# Patient Record
Sex: MALE | Race: BLACK OR AFRICAN AMERICAN | NOT HISPANIC OR LATINO | Employment: OTHER | URBAN - METROPOLITAN AREA
[De-identification: names, ages, dates, MRNs, and addresses within clinical notes are randomized per-mention and may not be internally consistent; named-entity substitution may affect disease eponyms.]

---

## 2017-01-24 ENCOUNTER — LAB VISIT (OUTPATIENT)
Dept: LAB | Facility: HOSPITAL | Age: 72
End: 2017-01-24
Attending: INTERNAL MEDICINE
Payer: MEDICARE

## 2017-01-24 DIAGNOSIS — Z85.71 HISTORY OF HODGKIN'S LYMPHOMA: ICD-10-CM

## 2017-01-24 DIAGNOSIS — C85.90 NON-HODGKIN'S LYMPHOMA, UNSPECIFIED BODY REGION, UNSPECIFIED NON-HODGKIN LYMPHOMA TYPE: ICD-10-CM

## 2017-01-24 LAB
ALBUMIN SERPL BCP-MCNC: 3.7 G/DL
ALP SERPL-CCNC: 78 U/L
ALT SERPL W/O P-5'-P-CCNC: 19 U/L
ANION GAP SERPL CALC-SCNC: 5 MMOL/L
AST SERPL-CCNC: 18 U/L
B2 MICROGLOB SERPL-MCNC: 1.9 UG/ML
BASOPHILS # BLD AUTO: 0.01 K/UL
BASOPHILS NFR BLD: 0.3 %
BILIRUB SERPL-MCNC: 0.4 MG/DL
BUN SERPL-MCNC: 17 MG/DL
CALCIUM SERPL-MCNC: 9.2 MG/DL
CHLORIDE SERPL-SCNC: 104 MMOL/L
CO2 SERPL-SCNC: 32 MMOL/L
CREAT SERPL-MCNC: 1.1 MG/DL
DIFFERENTIAL METHOD: ABNORMAL
EOSINOPHIL # BLD AUTO: 0.1 K/UL
EOSINOPHIL NFR BLD: 2.3 %
ERYTHROCYTE [DISTWIDTH] IN BLOOD BY AUTOMATED COUNT: 14.7 %
EST. GFR  (AFRICAN AMERICAN): >60 ML/MIN/1.73 M^2
EST. GFR  (NON AFRICAN AMERICAN): >60 ML/MIN/1.73 M^2
GLUCOSE SERPL-MCNC: 99 MG/DL
HCT VFR BLD AUTO: 43.2 %
HGB BLD-MCNC: 14 G/DL
LDH SERPL L TO P-CCNC: 196 U/L
LYMPHOCYTES # BLD AUTO: 1.6 K/UL
LYMPHOCYTES NFR BLD: 46 %
MCH RBC QN AUTO: 27.7 PG
MCHC RBC AUTO-ENTMCNC: 32.4 %
MCV RBC AUTO: 85 FL
MONOCYTES # BLD AUTO: 0.4 K/UL
MONOCYTES NFR BLD: 10.9 %
NEUTROPHILS # BLD AUTO: 1.4 K/UL
NEUTROPHILS NFR BLD: 40.5 %
PLATELET # BLD AUTO: 246 K/UL
PMV BLD AUTO: 8.8 FL
POTASSIUM SERPL-SCNC: 4.5 MMOL/L
PROT SERPL-MCNC: 7.2 G/DL
RBC # BLD AUTO: 5.06 M/UL
SODIUM SERPL-SCNC: 141 MMOL/L
TSH SERPL DL<=0.005 MIU/L-ACNC: 3.28 UIU/ML
WBC # BLD AUTO: 3.48 K/UL

## 2017-01-24 PROCEDURE — 85025 COMPLETE CBC W/AUTO DIFF WBC: CPT

## 2017-01-24 PROCEDURE — 84443 ASSAY THYROID STIM HORMONE: CPT

## 2017-01-24 PROCEDURE — 82232 ASSAY OF BETA-2 PROTEIN: CPT

## 2017-01-24 PROCEDURE — 80053 COMPREHEN METABOLIC PANEL: CPT

## 2017-01-24 PROCEDURE — 83615 LACTATE (LD) (LDH) ENZYME: CPT

## 2017-01-24 PROCEDURE — 36415 COLL VENOUS BLD VENIPUNCTURE: CPT

## 2017-01-27 ENCOUNTER — OFFICE VISIT (OUTPATIENT)
Dept: HEMATOLOGY/ONCOLOGY | Facility: CLINIC | Age: 72
End: 2017-01-27
Payer: MEDICARE

## 2017-01-27 ENCOUNTER — DOCUMENTATION ONLY (OUTPATIENT)
Dept: HEMATOLOGY/ONCOLOGY | Facility: CLINIC | Age: 72
End: 2017-01-27

## 2017-01-27 ENCOUNTER — TELEPHONE (OUTPATIENT)
Dept: PSYCHIATRY | Facility: CLINIC | Age: 72
End: 2017-01-27

## 2017-01-27 ENCOUNTER — INFUSION (OUTPATIENT)
Dept: INFUSION THERAPY | Facility: HOSPITAL | Age: 72
End: 2017-01-27
Attending: INTERNAL MEDICINE
Payer: MEDICARE

## 2017-01-27 VITALS
HEART RATE: 86 BPM | DIASTOLIC BLOOD PRESSURE: 80 MMHG | WEIGHT: 183 LBS | BODY MASS INDEX: 25.52 KG/M2 | SYSTOLIC BLOOD PRESSURE: 132 MMHG | TEMPERATURE: 99 F

## 2017-01-27 DIAGNOSIS — I10 ESSENTIAL HYPERTENSION, BENIGN: ICD-10-CM

## 2017-01-27 DIAGNOSIS — C85.90 NON-HODGKIN'S LYMPHOMA, UNSPECIFIED BODY REGION, UNSPECIFIED NON-HODGKIN LYMPHOMA TYPE: Primary | ICD-10-CM

## 2017-01-27 DIAGNOSIS — C85.90 LYMPHOMA: Primary | ICD-10-CM

## 2017-01-27 PROCEDURE — 1126F AMNT PAIN NOTED NONE PRSNT: CPT | Mod: S$GLB,,, | Performed by: INTERNAL MEDICINE

## 2017-01-27 PROCEDURE — 99999 PR PBB SHADOW E&M-EST. PATIENT-LVL III: CPT | Mod: PBBFAC,,, | Performed by: INTERNAL MEDICINE

## 2017-01-27 PROCEDURE — 3075F SYST BP GE 130 - 139MM HG: CPT | Mod: S$GLB,,, | Performed by: INTERNAL MEDICINE

## 2017-01-27 PROCEDURE — 1157F ADVNC CARE PLAN IN RCRD: CPT | Mod: S$GLB,,, | Performed by: INTERNAL MEDICINE

## 2017-01-27 PROCEDURE — 1160F RVW MEDS BY RX/DR IN RCRD: CPT | Mod: S$GLB,,, | Performed by: INTERNAL MEDICINE

## 2017-01-27 PROCEDURE — 3079F DIAST BP 80-89 MM HG: CPT | Mod: S$GLB,,, | Performed by: INTERNAL MEDICINE

## 2017-01-27 PROCEDURE — 96523 IRRIG DRUG DELIVERY DEVICE: CPT

## 2017-01-27 PROCEDURE — 1159F MED LIST DOCD IN RCRD: CPT | Mod: S$GLB,,, | Performed by: INTERNAL MEDICINE

## 2017-01-27 PROCEDURE — 99213 OFFICE O/P EST LOW 20 MIN: CPT | Mod: S$GLB,,, | Performed by: INTERNAL MEDICINE

## 2017-01-27 PROCEDURE — 25000003 PHARM REV CODE 250: Performed by: INTERNAL MEDICINE

## 2017-01-27 RX ORDER — HEPARIN 100 UNIT/ML
500 SYRINGE INTRAVENOUS
Status: COMPLETED | OUTPATIENT
Start: 2017-01-27 | End: 2017-01-27

## 2017-01-27 RX ORDER — HEPARIN 100 UNIT/ML
SYRINGE INTRAVENOUS
Status: DISCONTINUED
Start: 2017-01-27 | End: 2017-01-27 | Stop reason: HOSPADM

## 2017-01-27 RX ORDER — SODIUM CHLORIDE 0.9 % (FLUSH) 0.9 %
10 SYRINGE (ML) INJECTION
Status: DISCONTINUED | OUTPATIENT
Start: 2017-01-27 | End: 2017-01-27 | Stop reason: HOSPADM

## 2017-01-27 RX ADMIN — HEPARIN 500 UNITS: 100 SYRINGE at 08:01

## 2017-01-27 RX ADMIN — SODIUM CHLORIDE, PRESERVATIVE FREE 10 ML: 5 INJECTION INTRAVENOUS at 08:01

## 2017-01-27 NOTE — MR AVS SNAPSHOT
[facility]  [address] [place] [address]  Phone: [phone]                  Long Najera   2017 8:00 AM   Office Visit    Description:  Male : 1945   Provider:  Jennifer Cao MD   Department[facility]  [facility]           Reason for Visit     Follow-up           Diagnoses this Visit        Comments    Non-Hodgkin's lymphoma, unspecified body region, unspecified non-Hodgkin lymphoma type    -  Primary            To Do List           Future Appointments        Provider Department Dept Phone    2017 8:45 AM CHAIR 01 [facility] [phone]    3/3/2017 8:00 AM CHAIR 01 [facility] [phone]    2017 8:15 AM [facility] [phone]    2017 8:00 AM Jennifer Cao MD [facility] [phone]      Goals (5 Years of Data)     None      Follow-Up and Disposition     Return in about 3 months (around 2017).      [facility]     [facility] -  Assistance  Registered nurses in the [facility] provide clinical advisement, health education, appointment booking, and other advisory services.  Call for this free service at [phone].             Medications           Message regarding Medications     Verify the changes and/or additions to your medication regime listed below are the same as discussed with your clinician today.  If any of these changes or additions are incorrect, please notify your healthcare provider.             Verify that the below list of medications is an accurate representation of the medications you are currently taking.  If none reported, the list may be blank. If incorrect, please contact your healthcare provider. Carry this list with you in case of emergency.           Current Medications     aspirin (ECOTRIN) 81 MG EC tablet Take 1 tablet (81 mg total) by mouth once  daily.    lisinopril-hydrochlorothiazide (PRINZIDE,ZESTORETIC) 10-12.5 mg per tablet Take 1 tablet by mouth once daily.    sildenafil (VIAGRA) 100 MG tablet Take 0.5 tablets (50 mg total) by mouth daily as needed.    atorvastatin (LIPITOR) 20 MG tablet Take 1 tablet (20 mg total) by mouth once daily.           Clinical Reference Information           Vital Signs - Last Recorded  Most recent update: 1/27/2017  8:13 AM by Massiel De Guzman MA    BP Pulse Temp Wt BMI    132/80 (BP Location: Right arm, Patient Position: Sitting, BP Method: Manual) 86 98.7 °F (37.1 °C) (Oral) 83 kg (182 lb 15.7 oz) 25.52 kg/m2      Blood Pressure          Most Recent Value    BP  132/80      Allergies as of 1/27/2017     Tylenol-codeine [Acetaminophen-codeine]      Immunizations Administered on Date of Encounter - 1/27/2017     None      Orders Placed During Today's Visit     Future Labs/Procedures Expected by Expires    Beta 2 Microglobulin, Serum  1/27/2017 1/27/2018    CBC auto differential  1/27/2017 1/27/2018    Comprehensive metabolic panel  1/27/2017 1/27/2018    Lactate dehydrogenase  1/27/2017 1/27/2018

## 2017-01-27 NOTE — PROGRESS NOTES
Subjective:       Patient ID: Long Najera is a 71 y.o. male.    Chief Complaint: Follow-up    HPI  Mr. Najera comes to the clinic for f/u for NHL.  Hodgkins lymphoma stage II 22 years ago treated with mantle radiation at [facility]   He was diagnosed with stage IV large cell lymphoma, though the subtype was unclear, and he completed 6 cycles of R-CHOP therapy to 9/2013      Pt previously followed by Dr. Harris.   Last f/u with Dr. Harris 2014.  Pt also followed by Dr. Alvarez    Today, pt doing well.  No fever/night sweats/wt loss  Appetite and weight stable.  No fatigue  No rashes  No recent infections  He continues with mild LEMON-stable  He is requesting renewal of Handicapped License Tag    Colonoscopy 2-3 yrs ago at [facility] -polyps pathologically benign      PET/CT 1/22/2016 Findings consistent with interval significant improvement in the neoplastic disease within the chest indicated by disappearance of a hypermetabolic right subpectoral lymph node and shrinkage/decreased metabolism of bilateral axillary lymph nodes.        Lymphoma history:  His past medical history is notable for Hodgkins lymphoma stage II 21 years ago treated with mantle radiation at [facility] where he worked at the time.  He was then without symptoms until 2/2013 when he noted the onset of a right axillary node which progressively increased accompanied by the onset of fevers, weight loss and drenching night sweats.  He presented for evaluation in 4/2013 and underwent right axillary lymph node core biopsy 3/7/13 revealing suspicious but nondiagnostic findings of scattered large atypical B lymphocytes that expressed CD20, pax-5 but not CD10, CD15 or CD30. Subsequent complete lymph node biopsy 4/15/13 was reviewed at [facility] finding showing a diffuse scatter involvement of large cells in a background of granulomatous and inflamed tissue. The large cells were CD20 negative but they did have uniform expression of other B markers  including Oct-2 and CD45. The absence of CD30, MUM-1 and EBV (by in-situ hybridization) makes Hodgkins less likely. 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  Bone marrow 5/1/13 cellularity was 70% with focal involvement of large cell lymphoma (10-16% overall) which expressed CD20, pax-5, CD45 and Ki-67 with normal cytogenetics and FISH for abnormalities of bcl6, myc, bcl2 and t(11;14) were negative.      Hence, he was diagnosed with stage IV large cell lymphoma, though the subtype was unclear, and he completed 6 cycles of R-CHOP therapy  9/2013.  Since completing his therapy he has been feeling well and progressively stronger.  He has been without sweats, weight loss or fever since early in his therapy last year.  Some relative increase in dyspnea since his therapy last year but stable since then and he feels as if it's getting somewhat better recently (in line with his resolving anemia).      PET-CT 8/30/13 just after finishing therapy showed the largest right axillary node now measures 3.0 x 2.8 cm compared to 3.5 x 3.2 cm previously and has a max SUV of 2.9 compared to 4.0 previously. The hypermetabolic tumor volume of this node is 21.1 cc.     PET-CT 4/25/14 showed a similar appearing soft tissue focus within the right axilla stable in size from prior examination (in January) with slight decrease in hypermetabolic activity demonstrating a Max SUV of 2.1 on today's examination (previously 2.5). However, there is evidence of recurrent disease with interval increase in hypermetabolic activity involving a smaller right retropectoral lymph node demonstrating a max SUV of 2.8 (previously 2.3). Additionally, there is now contralateral disease within the left axilla evidenced by a hypermetabolic axillary lymph node demonstrating a max SUV of 3.0. No other areas of focal, abnormally increased FDG activity identified outside of these aforementioned regions.  There is otherwise physiologic uptake within the periodontal regions, with voiding glandular structures of the head and neck, bilateral renal collecting systems, and bowel. There is a focus of hypermetabolic activity posterior to the proximal humerus representing muscle activity.    Review of Systems   Constitutional: Negative for appetite change, diaphoresis, fever and unexpected weight change.   HENT: Negative for congestion, mouth sores and sore throat.    Eyes: Negative.    Respiratory: Positive for shortness of breath (mild LEMON-stable). Negative for cough.    Cardiovascular: Negative for chest pain and leg swelling.   Gastrointestinal: Negative for abdominal pain, blood in stool, constipation, diarrhea and nausea.   Genitourinary: Negative for dysuria and hematuria.   Musculoskeletal: Negative for arthralgias and back pain.   Skin: Negative for rash.   Neurological: Negative for weakness, light-headedness and numbness.       Objective:     ECOG 1  Vitals:    01/27/17 0810   BP: 132/80   BP Location: Right arm   Patient Position: Sitting   BP Method: Manual   Pulse: 86   Temp: 98.7 °F (37.1 °C)   TempSrc: Oral   Weight: 83 kg (182 lb 15.7 oz)       Physical Exam   Constitutional: He is oriented to person, place, and time. He appears well-developed and well-nourished.   HENT:   Head: Normocephalic.   Mouth/Throat: Oropharynx is clear and moist. No oropharyngeal exudate.   Eyes: No scleral icterus.   Neck: Normal range of motion. Neck supple. No thyromegaly present.   Cardiovascular: Normal rate, regular rhythm and normal heart sounds.    No murmur heard.  Pulmonary/Chest: Effort normal and breath sounds normal. He has no wheezes. He has no rales.   Abdominal: Soft. Bowel sounds are normal. He exhibits no distension and no mass. There is no hepatosplenomegaly. There is no tenderness. There is no rebound and no guarding.   Musculoskeletal: Normal range of motion. He exhibits no edema.   Lymphadenopathy:     He has  no cervical adenopathy.     He has no axillary adenopathy.        Right: No supraclavicular adenopathy present.        Left: No supraclavicular adenopathy present.   Neurological: He is alert and oriented to person, place, and time. No cranial nerve deficit.   Skin: No rash noted. No erythema.   Psychiatric: He has a normal mood and affect.       Lab Results   Component Value Date    WBC 3.48 (L) 01/24/2017    HGB 14.0 01/24/2017    HCT 43.2 01/24/2017    MCV 85 01/24/2017     01/24/2017           PET/CT 1/22/2016 Findings consistent with interval significant improvement in the neoplastic disease within the chest indicated by disappearance of a hypermetabolic right subpectoral lymph node and shrinkage/decreased metabolism of bilateral axillary lymph nodes.          Results for LOUISE LONDON (MRN [id number]) as of 10/27/2016 08:29   Ref. Range 4/22/2016 08:15 7/22/2016 08:50 10/24/2016 07:49   LD Latest Ref Range: 110 - 260 U/L 200 196 204     Results for LOUISE LONDON (MRN [id number]) as of 1/27/2017 09:17   Ref. Range 1/24/2017 08:46   TSH Latest Ref Range: 0.400 - 4.000 uIU/mL 3.278     Assessment:       1. Non-Hodgkin's lymphoma, unspecified body region, unspecified non-Hodgkin lymphoma type    2. Essential hypertension, benign        Plan:     1 Pt  clinically stable    Hx of Hodgkin's lymphoma stage II >20 years ago treated with mantle radiation at [facility]   He has history of stage IV large cell lymphoma, subtype was unclear with some aggressive features and he completed 6 cycles of R-CHOP therapy  9/2013.  PETCT 2014 -axillary mass significantly improved: no has some residual disease in the area but more concerning is the small node on the other side SUV values are still very very low to be suggestive (3.0)    Pt seen by Dr. Alvarez and determined He has DLBCL  with some unusual features and aggressive features Axillary mass significantly improved: no has some residual disease in the area  but more concerning is the small node on the other sideSUV values are still very very low to be suggestive (3.0) max After his 6 cycles of therapy his scans were never completely negative and he has had low level activity, mostly at his right axilla, but recently at the left axilla and in the retroperitoneal area.      PET findings 1/2016   Findings consistent with interval significant improvement in the neoplastic disease within the chest indicated by disappearance of a hypermetabolic right subpectoral lymph node and shrinkage/decreased metabolism of bilateral axillary lymph nodes.    He will be followed without any more scans unless his symptoms, blood counts or lymph node exam dictate reevaluation.   If/when he has a clear relapse we will need a new biopsy to define his pathology      CBC reveals mild intermittent leukopenia, nl Hb and nl plt count  LDH  Remains nl    If he develops sig  leukopenia or sig anemia, plan marrow  Asymptomatic  Surveillance PET imaging - sig improvement   Cbc,cmp, LDH, B-2 microglobulin prior to f/u  TSH wnl    Handicap renewal completed    2. Well-controlled      Cont anti-hypertensive meds    CBC, CMP, LDH , B-2.microglobulin prior to f/u  3mos        Cc: MD Hi

## 2017-02-03 PROBLEM — R97.20 ELEVATED PSA: Status: ACTIVE | Noted: 2017-02-03

## 2017-03-03 ENCOUNTER — INFUSION (OUTPATIENT)
Dept: INFUSION THERAPY | Facility: HOSPITAL | Age: 72
End: 2017-03-03
Attending: INTERNAL MEDICINE
Payer: MEDICARE

## 2017-03-03 DIAGNOSIS — C85.90 LYMPHOMA: Primary | ICD-10-CM

## 2017-03-03 PROCEDURE — 25000003 PHARM REV CODE 250: Performed by: INTERNAL MEDICINE

## 2017-03-03 PROCEDURE — 96523 IRRIG DRUG DELIVERY DEVICE: CPT

## 2017-03-03 RX ORDER — SODIUM CHLORIDE 0.9 % (FLUSH) 0.9 %
10 SYRINGE (ML) INJECTION
Status: DISCONTINUED | OUTPATIENT
Start: 2017-03-03 | End: 2017-03-03 | Stop reason: HOSPADM

## 2017-03-03 RX ORDER — HEPARIN 100 UNIT/ML
500 SYRINGE INTRAVENOUS
Status: COMPLETED | OUTPATIENT
Start: 2017-03-03 | End: 2017-03-03

## 2017-03-03 RX ORDER — HEPARIN 100 UNIT/ML
SYRINGE INTRAVENOUS
Status: DISCONTINUED
Start: 2017-03-03 | End: 2017-03-03 | Stop reason: HOSPADM

## 2017-03-03 RX ADMIN — SODIUM CHLORIDE, PRESERVATIVE FREE 10 ML: 5 INJECTION INTRAVENOUS at 08:03

## 2017-03-03 RX ADMIN — HEPARIN 500 UNITS: 100 SYRINGE at 08:03

## 2017-03-09 ENCOUNTER — OFFICE VISIT (OUTPATIENT)
Dept: UROLOGY | Facility: CLINIC | Age: 72
End: 2017-03-09
Payer: MEDICARE

## 2017-03-09 VITALS
BODY MASS INDEX: 25.81 KG/M2 | WEIGHT: 180.31 LBS | SYSTOLIC BLOOD PRESSURE: 110 MMHG | HEIGHT: 70 IN | HEART RATE: 70 BPM | DIASTOLIC BLOOD PRESSURE: 78 MMHG

## 2017-03-09 DIAGNOSIS — N52.9 ED (ERECTILE DYSFUNCTION) OF ORGANIC ORIGIN: ICD-10-CM

## 2017-03-09 DIAGNOSIS — N13.8 BPH WITH OBSTRUCTION/LOWER URINARY TRACT SYMPTOMS: ICD-10-CM

## 2017-03-09 DIAGNOSIS — N52.9 ERECTILE DYSFUNCTION, UNSPECIFIED ERECTILE DYSFUNCTION TYPE: ICD-10-CM

## 2017-03-09 DIAGNOSIS — R35.1 NOCTURIA MORE THAN TWICE PER NIGHT: ICD-10-CM

## 2017-03-09 DIAGNOSIS — R97.20 ELEVATED PSA: Primary | ICD-10-CM

## 2017-03-09 DIAGNOSIS — N40.1 BPH WITH OBSTRUCTION/LOWER URINARY TRACT SYMPTOMS: ICD-10-CM

## 2017-03-09 LAB
BILIRUB SERPL-MCNC: NORMAL MG/DL
BLOOD URINE, POC: NORMAL
COLOR, POC UA: YELLOW
GLUCOSE UR QL STRIP: NORMAL
KETONES UR QL STRIP: NORMAL
LEUKOCYTE ESTERASE URINE, POC: NORMAL
NITRITE, POC UA: NORMAL
PH, POC UA: 5
PROTEIN, POC: NORMAL
SPECIFIC GRAVITY, POC UA: 1030
UROBILINOGEN, POC UA: NORMAL

## 2017-03-09 PROCEDURE — 99204 OFFICE O/P NEW MOD 45 MIN: CPT | Mod: 25,S$GLB,, | Performed by: UROLOGY

## 2017-03-09 PROCEDURE — 1126F AMNT PAIN NOTED NONE PRSNT: CPT | Mod: S$GLB,,, | Performed by: UROLOGY

## 2017-03-09 PROCEDURE — 81001 URINALYSIS AUTO W/SCOPE: CPT | Mod: S$GLB,,, | Performed by: UROLOGY

## 2017-03-09 PROCEDURE — 1160F RVW MEDS BY RX/DR IN RCRD: CPT | Mod: S$GLB,,, | Performed by: UROLOGY

## 2017-03-09 PROCEDURE — 1159F MED LIST DOCD IN RCRD: CPT | Mod: S$GLB,,, | Performed by: UROLOGY

## 2017-03-09 PROCEDURE — 3074F SYST BP LT 130 MM HG: CPT | Mod: S$GLB,,, | Performed by: UROLOGY

## 2017-03-09 PROCEDURE — 99999 PR PBB SHADOW E&M-EST. PATIENT-LVL III: CPT | Mod: PBBFAC,,, | Performed by: UROLOGY

## 2017-03-09 PROCEDURE — 1157F ADVNC CARE PLAN IN RCRD: CPT | Mod: S$GLB,,, | Performed by: UROLOGY

## 2017-03-09 PROCEDURE — 3078F DIAST BP <80 MM HG: CPT | Mod: S$GLB,,, | Performed by: UROLOGY

## 2017-03-09 RX ORDER — CIPROFLOXACIN 500 MG/1
500 TABLET ORAL 2 TIMES DAILY
Qty: 6 TABLET | Refills: 0 | Status: SHIPPED | OUTPATIENT
Start: 2017-03-09 | End: 2017-03-12

## 2017-03-09 RX ORDER — SILDENAFIL 100 MG/1
50 TABLET, FILM COATED ORAL DAILY PRN
Qty: 10 TABLET | Refills: 11 | Status: SHIPPED | OUTPATIENT
Start: 2017-03-09

## 2017-03-09 NOTE — PROGRESS NOTES
Subjective:       Patient ID: Long Najera is a 71 y.o. male who was referred by Grecia Do MD    Chief Complaint:   Chief Complaint   Patient presents with    Elevated PSA     new pt coming in for elevated psa 4.7       Elevated PSA  Patient is here with an elevated PSA. He has no personal history and no family history of prostate cancer. He has a prior genitourinary history of erectile dysfunction.  Previous PSA values are :  PROSTATE SPECIFIC ANTIGEN, SCR - [facility] < OR = 4.0 ng/mL 4.7 (H)   Comments:     This test was performed using the Siemens   chemiluminescent method. Values obtained from   different assay methods cannot be used   interchangeably. PSA levels, regardless of   value, should not be interpreted as absolute   evidence of the presence or absence of disease.          Resulting Agency  [facility]   Narrative   FASTING:YES      Specimen Collected: 01/27/17  8:52 AM Last Resulted: 01/28/17  3:56 AM          Benign Prostatic Hyperplasia  He patient reports nocturia one time a night. He denies straining, urgency and weak stream. The patient states symptoms are of mild severity. Onset of symptoms was several years ago and was gradual in onset.  He has no personal history and no family history of prostate cancer. He reports a history of no complicating symptoms. He denies flank pain, gross hematuria, kidney stones and recurrent UTI.  He is currently taking no prostate medications.    Erectile Dysfunction  Patient complains of erectile dysfunction. Onset of dysfunction was several years ago and was gradual in onset.  Patient states the nature of difficulty is both attaining and maintaining erection. Full erections occur with intercourse. Partial erections occur with intercourse. Libido is not affected. Risk factors for ED include cardiovascular disease. Patient denies history of pelvic radiation. Previous treatment of ED includes Viagra.  .        ACTIVE MEDICAL ISSUES:  Patient Active Problem List    Diagnosis    ED (erectile dysfunction)    History of non-Hodgkin's lymphoma    Gynecomastia, male    Essential hypertension, benign    NHL (non-Hodgkin's lymphoma)    Hypercholesterolemia    Body mass index (BMI) of 24.0-24.9 in adult    BMI 25.0-25.9,adult    Elevated PSA       PAST MEDICAL HISTORY  Past Medical History:   Diagnosis Date    Asthma 30+ years ago    Cancer 1992    non hodgkins lymphoma.  5/7/2013  Currently w/ dx of cancer to lymph nodes and metastasis    Hypercholesteremia     Hypertension     Lymphoma        PAST SURGICAL HISTORY:  Past Surgical History:   Procedure Laterality Date    axilla biopsy  4/15/2013    axillary lymph node removal  10/11/3013    PORTACATH PLACEMENT  05/08/2013    TONSILLECTOMY, ADENOIDECTOMY         SOCIAL HISTORY:  Social History   Substance Use Topics    Smoking status: Never Smoker    Smokeless tobacco: Never Used    Alcohol use No       FAMILY HISTORY:  Family History   Problem Relation Age of Onset    Hypertension Mother     Seizures Sister        ALLERGIES AND MEDICATIONS: updated and reviewed.  Review of patient's allergies indicates:   Allergen Reactions    Tylenol-codeine [acetaminophen-codeine] Nausea Only     Current Outpatient Prescriptions   Medication Sig    aspirin (ECOTRIN) 81 MG EC tablet Take 1 tablet (81 mg total) by mouth once daily.    atorvastatin (LIPITOR) 20 MG tablet Take 1 tablet (20 mg total) by mouth once daily.    lisinopril-hydrochlorothiazide (PRINZIDE,ZESTORETIC) 10-12.5 mg per tablet Take 1 tablet by mouth once daily.    sildenafil (VIAGRA) 100 MG tablet Take 0.5 tablets (50 mg total) by mouth daily as needed.    ciprofloxacin HCl (CIPRO) 500 MG tablet Take 1 tablet (500 mg total) by mouth 2 (two) times daily.     No current facility-administered medications for this visit.        Review of Systems   Constitutional: Negative for activity change, fatigue, fever and unexpected weight change.   HENT: Negative for  "congestion.    Eyes: Negative for redness.   Respiratory: Negative for chest tightness and shortness of breath.    Cardiovascular: Negative for chest pain and leg swelling.   Gastrointestinal: Negative for abdominal pain, constipation, diarrhea, nausea and vomiting.   Genitourinary: Negative for dysuria, flank pain, frequency, hematuria, penile pain, penile swelling, scrotal swelling, testicular pain and urgency.   Musculoskeletal: Negative for arthralgias and back pain.   Neurological: Negative for dizziness and light-headedness.   Psychiatric/Behavioral: Negative for behavioral problems and confusion. The patient is not nervous/anxious.    All other systems reviewed and are negative.      Objective:      Vitals:    03/09/17 1440   BP: 110/78   Pulse: 70   Weight: 81.8 kg (180 lb 5.4 oz)   Height: 5' 10" (1.778 m)     Physical Exam   Nursing note and vitals reviewed.  Constitutional: He is oriented to person, place, and time. He appears well-developed and well-nourished.   HENT:   Head: Normocephalic.   Eyes: Conjunctivae are normal.   Neck: Normal range of motion. No tracheal deviation present. No thyromegaly present.   Cardiovascular: Normal rate and normal heart sounds.    Pulmonary/Chest: Effort normal and breath sounds normal. No respiratory distress. He has no wheezes.   Abdominal: Soft. He exhibits no distension and no mass. There is no hepatosplenomegaly. There is no tenderness. There is no rebound, no guarding and no CVA tenderness. No hernia. Hernia confirmed negative in the right inguinal area and confirmed negative in the left inguinal area.   Genitourinary: Rectum normal, testes normal and penis normal. Rectal exam shows no external hemorrhoid, no mass and no tenderness. Prostate is enlarged. Prostate is not tender. Right testis shows no mass and no tenderness. Left testis shows no mass and no tenderness. Uncircumcised.       Musculoskeletal: He exhibits no edema or tenderness.   Lymphadenopathy: No " inguinal adenopathy noted on the right or left side.   Neurological: He is alert and oriented to person, place, and time.   Skin: Skin is warm and dry. No rash noted. No erythema.     Psychiatric: He has a normal mood and affect. His behavior is normal. Judgment and thought content normal.       Urine dipstick shows negative for all components.  Micro exam: negative for WBC's or RBC's.    Assessment:       1. Elevated PSA    2. BPH with obstruction/lower urinary tract symptoms    3. Nocturia more than twice per night    4. ED (erectile dysfunction) of organic origin    5. Erectile dysfunction          Plan:       1. Elevated PSA    - POCT urinalysis, dipstick or tablet reag  - ciprofloxacin HCl (CIPRO) 500 MG tablet; Take 1 tablet (500 mg total) by mouth 2 (two) times daily.  Dispense: 6 tablet; Refill: 0  -  Biopsy Prostate Singl Multi Specimens; Future    2. BPH with obstruction/lower urinary tract symptoms  He may need Flomax at some point    3. Nocturia more than twice per night  stable    4. ED (erectile dysfunction) of organic origin  Viagra    5. Erectile dysfunction    - sildenafil (VIAGRA) 100 MG tablet; Take 0.5 tablets (50 mg total) by mouth daily as needed.  Dispense: 10 tablet; Refill: 11            Return for Prostate Biopsy.

## 2017-03-09 NOTE — MR AVS SNAPSHOT
[facility]  [address]  [address]  [place] [address]  Phone: [phone]                  Long Najera   3/9/2017 2:45 PM   Office Visit    Description:  Male : 1945   Provider:  WESTLEY Gonzalez MD   Department:  [facility]           Reason for Visit     Elevated PSA           Diagnoses this Visit        Comments    Elevated PSA    -  Primary     BPH with obstruction/lower urinary tract symptoms         Nocturia more than twice per night         ED (erectile dysfunction) of organic origin         Erectile dysfunction, unspecified erectile dysfunction type                To Do List           Future Appointments        Provider Department Dept Phone    2017 8:15 AM LAB[facility] [phone]    2017 8:00 AM Jennifer Cao MD [facility] [phone]    2017 8:45 AM CHAIR 02 [facility] [phone]      Goals (5 Years of Data)     None      Follow-Up and Disposition     Return for Prostate Biopsy.       These Medications        Disp Refills Start End    ciprofloxacin HCl (CIPRO) 500 MG tablet 6 tablet 0 3/9/2017 3/12/2017    Take 1 tablet (500 mg total) by mouth 2 (two) times daily. - Oral    Pharmacy: [address] Ph #: [phone]       sildenafil (VIAGRA) 100 MG tablet 10 tablet 11 3/9/2017     Take 0.5 tablets (50 mg total) by mouth daily as needed. - Oral    [facility] [address] Ph #: [phone]         [facility]     [facility] -  Assistance  Registered nurses in the [facility] provide clinical advisement, health education, appointment booking, and other advisory services.  Call for this free service at [phone].             Medications           Message regarding Medications     Verify the changes and/or additions to  "your medication regime listed below are the same as discussed with your clinician today.  If any of these changes or additions are incorrect, please notify your healthcare provider.        START taking these NEW medications        Refills    ciprofloxacin HCl (CIPRO) 500 MG tablet 0    Sig: Take 1 tablet (500 mg total) by mouth 2 (two) times daily.    Class: Normal    Route: Oral           Verify that the below list of medications is an accurate representation of the medications you are currently taking.  If none reported, the list may be blank. If incorrect, please contact your healthcare provider. Carry this list with you in case of emergency.           Current Medications     aspirin (ECOTRIN) 81 MG EC tablet Take 1 tablet (81 mg total) by mouth once daily.    atorvastatin (LIPITOR) 20 MG tablet Take 1 tablet (20 mg total) by mouth once daily.    lisinopril-hydrochlorothiazide (PRINZIDE,ZESTORETIC) 10-12.5 mg per tablet Take 1 tablet by mouth once daily.    sildenafil (VIAGRA) 100 MG tablet Take 0.5 tablets (50 mg total) by mouth daily as needed.    ciprofloxacin HCl (CIPRO) 500 MG tablet Take 1 tablet (500 mg total) by mouth 2 (two) times daily.           Clinical Reference Information           Your Vitals Were     BP Pulse Height Weight BMI    110/78 70 5' 10" (1.778 m) 81.8 kg (180 lb 5.4 oz) 25.88 kg/m2      Blood Pressure          Most Recent Value    BP  110/78      Allergies as of 3/9/2017     Tylenol-codeine [Acetaminophen-codeine]      Immunizations Administered on Date of Encounter - 3/9/2017     None      Orders Placed During Today's Visit      Normal Orders This Visit    POCT urinalysis, dipstick or tablet reag     Future Labs/Procedures Expected by Expires    US Biopsy Prostate Singl Multi Specimens  3/9/2017 3/9/2018      Language Assistance Services     ATTENTION: Language assistance services are available, free of charge. Please call [phone].      ATENCIÓN: morales Harris " disposición servicios gratuitos de asistencia lingüística. Giorgi al [phone].     IKER Ý: N?u b?n nói Ti?ng Vi?t, có các d?ch v? h? tr? ngôn ng? mi?n phí dành cho b?n. G?i s? [phone].         [facility] complies with applicable Federal civil rights laws and does not discriminate on the basis of race, color, national origin, age, disability, or sex.

## 2017-03-09 NOTE — LETTER
March 9, 2017      Grecia Do MD  [address] [facility] [address]           [facility]  [address] [facility] [address]  [address]  [facility] [address]  Phone: [phone]          Patient: Long Najera   MR Number: [id number]   YOB: 1945   Date of Visit: 3/9/2017       Dear Dr. Grecia Do:    Thank you for referring Long Najera to me for evaluation. Attached you will find relevant portions of my assessment and plan of care.    If you have questions, please do not hesitate to call me. I look forward to following Long Najera along with you.    Sincerely,    WESTLEY Gonzalez MD    Enclosure  CC:  No Recipients    If you would like to receive this communication electronically, please contact [email or web address] or [phone] to request more information on tritrue Link access.    For providers and/or their staff who would like to refer a patient to [facility], please contact us through our one-stop-shop provider referral line, Tona Law, at [phone].    If you feel you have received this communication in error or would no longer like to receive these types of communications, please e-mail [email or web address]

## 2017-03-20 ENCOUNTER — TELEPHONE (OUTPATIENT)
Dept: UROLOGY | Facility: CLINIC | Age: 72
End: 2017-03-20

## 2017-03-20 NOTE — TELEPHONE ENCOUNTER
----- Message from Marleny Patton sent at 3/20/2017  1:38 PM CDT -----  Patient would like to cancel procedure on April 3rd. Please call at [phone] thank you!

## 2017-03-20 NOTE — TELEPHONE ENCOUNTER
Patient canceled procedure and stated that he would call back with another date that was good for him.

## 2017-04-24 ENCOUNTER — LAB VISIT (OUTPATIENT)
Dept: LAB | Facility: HOSPITAL | Age: 72
End: 2017-04-24
Attending: INTERNAL MEDICINE
Payer: MEDICARE

## 2017-04-24 DIAGNOSIS — C85.90 NON-HODGKIN'S LYMPHOMA, UNSPECIFIED BODY REGION, UNSPECIFIED NON-HODGKIN LYMPHOMA TYPE: ICD-10-CM

## 2017-04-24 LAB
ALBUMIN SERPL BCP-MCNC: 3.5 G/DL
ALP SERPL-CCNC: 72 U/L
ALT SERPL W/O P-5'-P-CCNC: 9 U/L
ANION GAP SERPL CALC-SCNC: 6 MMOL/L
AST SERPL-CCNC: 19 U/L
B2 MICROGLOB SERPL-MCNC: 1.9 UG/ML
BASOPHILS # BLD AUTO: 0.01 K/UL
BASOPHILS NFR BLD: 0.3 %
BILIRUB SERPL-MCNC: 0.5 MG/DL
BUN SERPL-MCNC: 14 MG/DL
CALCIUM SERPL-MCNC: 8.9 MG/DL
CHLORIDE SERPL-SCNC: 103 MMOL/L
CO2 SERPL-SCNC: 33 MMOL/L
CREAT SERPL-MCNC: 0.9 MG/DL
DIFFERENTIAL METHOD: ABNORMAL
EOSINOPHIL # BLD AUTO: 0.2 K/UL
EOSINOPHIL NFR BLD: 4.2 %
ERYTHROCYTE [DISTWIDTH] IN BLOOD BY AUTOMATED COUNT: 14.9 %
EST. GFR  (AFRICAN AMERICAN): >60 ML/MIN/1.73 M^2
EST. GFR  (NON AFRICAN AMERICAN): >60 ML/MIN/1.73 M^2
GLUCOSE SERPL-MCNC: 95 MG/DL
HCT VFR BLD AUTO: 38.8 %
HGB BLD-MCNC: 12.8 G/DL
LDH SERPL L TO P-CCNC: 200 U/L
LYMPHOCYTES # BLD AUTO: 1.4 K/UL
LYMPHOCYTES NFR BLD: 40.7 %
MCH RBC QN AUTO: 27.7 PG
MCHC RBC AUTO-ENTMCNC: 33 %
MCV RBC AUTO: 84 FL
MONOCYTES # BLD AUTO: 0.5 K/UL
MONOCYTES NFR BLD: 13.3 %
NEUTROPHILS # BLD AUTO: 1.5 K/UL
NEUTROPHILS NFR BLD: 41.5 %
PLATELET # BLD AUTO: 257 K/UL
PMV BLD AUTO: 9.5 FL
POTASSIUM SERPL-SCNC: 4.3 MMOL/L
PROT SERPL-MCNC: 6.8 G/DL
RBC # BLD AUTO: 4.62 M/UL
SODIUM SERPL-SCNC: 142 MMOL/L
WBC # BLD AUTO: 3.54 K/UL

## 2017-04-24 PROCEDURE — 36415 COLL VENOUS BLD VENIPUNCTURE: CPT

## 2017-04-24 PROCEDURE — 80053 COMPREHEN METABOLIC PANEL: CPT

## 2017-04-24 PROCEDURE — 85025 COMPLETE CBC W/AUTO DIFF WBC: CPT

## 2017-04-24 PROCEDURE — 82232 ASSAY OF BETA-2 PROTEIN: CPT

## 2017-04-24 PROCEDURE — 83615 LACTATE (LD) (LDH) ENZYME: CPT

## 2017-04-27 ENCOUNTER — INFUSION (OUTPATIENT)
Dept: INFUSION THERAPY | Facility: HOSPITAL | Age: 72
End: 2017-04-27
Attending: INTERNAL MEDICINE
Payer: MEDICARE

## 2017-04-27 ENCOUNTER — OFFICE VISIT (OUTPATIENT)
Dept: HEMATOLOGY/ONCOLOGY | Facility: CLINIC | Age: 72
End: 2017-04-27
Payer: MEDICARE

## 2017-04-27 VITALS
OXYGEN SATURATION: 99 % | DIASTOLIC BLOOD PRESSURE: 76 MMHG | HEART RATE: 87 BPM | BODY MASS INDEX: 26.1 KG/M2 | WEIGHT: 181.88 LBS | SYSTOLIC BLOOD PRESSURE: 124 MMHG | TEMPERATURE: 98 F

## 2017-04-27 DIAGNOSIS — C85.90 NON-HODGKIN'S LYMPHOMA, UNSPECIFIED BODY REGION, UNSPECIFIED NON-HODGKIN LYMPHOMA TYPE: Primary | ICD-10-CM

## 2017-04-27 DIAGNOSIS — I10 ESSENTIAL HYPERTENSION, BENIGN: ICD-10-CM

## 2017-04-27 DIAGNOSIS — C85.90 LYMPHOMA: Primary | ICD-10-CM

## 2017-04-27 PROCEDURE — 1159F MED LIST DOCD IN RCRD: CPT | Mod: S$GLB,,, | Performed by: INTERNAL MEDICINE

## 2017-04-27 PROCEDURE — 3074F SYST BP LT 130 MM HG: CPT | Mod: S$GLB,,, | Performed by: INTERNAL MEDICINE

## 2017-04-27 PROCEDURE — 1160F RVW MEDS BY RX/DR IN RCRD: CPT | Mod: S$GLB,,, | Performed by: INTERNAL MEDICINE

## 2017-04-27 PROCEDURE — 1126F AMNT PAIN NOTED NONE PRSNT: CPT | Mod: S$GLB,,, | Performed by: INTERNAL MEDICINE

## 2017-04-27 PROCEDURE — 99213 OFFICE O/P EST LOW 20 MIN: CPT | Mod: S$GLB,,, | Performed by: INTERNAL MEDICINE

## 2017-04-27 PROCEDURE — 99999 PR PBB SHADOW E&M-EST. PATIENT-LVL III: CPT | Mod: PBBFAC,,, | Performed by: INTERNAL MEDICINE

## 2017-04-27 PROCEDURE — 25000003 PHARM REV CODE 250: Performed by: INTERNAL MEDICINE

## 2017-04-27 PROCEDURE — 3078F DIAST BP <80 MM HG: CPT | Mod: S$GLB,,, | Performed by: INTERNAL MEDICINE

## 2017-04-27 PROCEDURE — 96523 IRRIG DRUG DELIVERY DEVICE: CPT

## 2017-04-27 RX ORDER — HEPARIN 100 UNIT/ML
500 SYRINGE INTRAVENOUS
Status: COMPLETED | OUTPATIENT
Start: 2017-04-27 | End: 2017-04-27

## 2017-04-27 RX ORDER — SODIUM CHLORIDE 0.9 % (FLUSH) 0.9 %
10 SYRINGE (ML) INJECTION
Status: DISCONTINUED | OUTPATIENT
Start: 2017-04-27 | End: 2017-04-27 | Stop reason: HOSPADM

## 2017-04-27 RX ADMIN — HEPARIN 500 UNITS: 100 SYRINGE at 08:04

## 2017-04-27 RX ADMIN — SODIUM CHLORIDE, PRESERVATIVE FREE 10 ML: 5 INJECTION INTRAVENOUS at 08:04

## 2017-04-27 NOTE — PROGRESS NOTES
Subjective:       Patient ID: Long Najera is a 71 y.o. male.    Chief Complaint: Follow-up    HPI  Mr. Najera comes to the clinic for f/u for hx of  NHL.  Hodgkins lymphoma stage II 22 years ago treated with mantle radiation at [facility]   He was diagnosed with stage IV large cell lymphoma, though the subtype was unclear, and he completed 6 cycles of R-CHOP therapy to 9/2013      Pt previously followed by Dr. Harris.   Last f/u with Dr. Harris 2014.  Pt also followed by Dr. Alvarez    Today, pt doing well.  No new issues  No fever/night sweats/wt loss  Appetite and weight stable.  No fatigue  No rashes  No recent infections  No SOB/CP      Colonoscopy 2-3 yrs ago at [facility] -polyps pathologically benign      PET/CT 1/22/2016 Findings consistent with interval significant improvement in the neoplastic disease within the chest indicated by disappearance of a hypermetabolic right subpectoral lymph node and shrinkage/decreased metabolism of bilateral axillary lymph nodes.        Lymphoma history:  His past medical history is notable for Hodgkins lymphoma stage II 21 years ago treated with mantle radiation at [facility] where he worked at the time.  He was then without symptoms until 2/2013 when he noted the onset of a right axillary node which progressively increased accompanied by the onset of fevers, weight loss and drenching night sweats.  He presented for evaluation in 4/2013 and underwent right axillary lymph node core biopsy 3/7/13 revealing suspicious but nondiagnostic findings of scattered large atypical B lymphocytes that expressed CD20, pax-5 but not CD10, CD15 or CD30. Subsequent complete lymph node biopsy 4/15/13 was reviewed at [facility] finding showing a diffuse scatter involvement of large cells in a background of granulomatous and inflamed tissue. The large cells were CD20 negative but they did have uniform expression of other B markers including Oct-2 and CD45. The absence of CD30, MUM-1  and EBV (by in-situ hybridization) makes Hodgkins less likely. 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  Bone marrow 5/1/13 cellularity was 70% with focal involvement of large cell lymphoma (10-16% overall) which expressed CD20, pax-5, CD45 and Ki-67 with normal cytogenetics and FISH for abnormalities of bcl6, myc, bcl2 and t(11;14) were negative.      Hence, he was diagnosed with stage IV large cell lymphoma, though the subtype was unclear, and he completed 6 cycles of R-CHOP therapy  9/2013.  Since completing his therapy he has been feeling well and progressively stronger.  He has been without sweats, weight loss or fever since early in his therapy last year.  Some relative increase in dyspnea since his therapy last year but stable since then and he feels as if it's getting somewhat better recently (in line with his resolving anemia).      PET-CT 8/30/13 just after finishing therapy showed the largest right axillary node now measures 3.0 x 2.8 cm compared to 3.5 x 3.2 cm previously and has a max SUV of 2.9 compared to 4.0 previously. The hypermetabolic tumor volume of this node is 21.1 cc.     PET-CT 4/25/14 showed a similar appearing soft tissue focus within the right axilla stable in size from prior examination (in January) with slight decrease in hypermetabolic activity demonstrating a Max SUV of 2.1 on today's examination (previously 2.5). However, there is evidence of recurrent disease with interval increase in hypermetabolic activity involving a smaller right retropectoral lymph node demonstrating a max SUV of 2.8 (previously 2.3). Additionally, there is now contralateral disease within the left axilla evidenced by a hypermetabolic axillary lymph node demonstrating a max SUV of 3.0. No other areas of focal, abnormally increased FDG activity identified outside of these aforementioned regions. There is otherwise physiologic uptake within the  periodontal regions, with voiding glandular structures of the head and neck, bilateral renal collecting systems, and bowel. There is a focus of hypermetabolic activity posterior to the proximal humerus representing muscle activity.    Review of Systems   Constitutional: Negative for appetite change, diaphoresis, fever and unexpected weight change.   HENT: Negative for congestion and mouth sores.    Eyes: Negative.    Respiratory: Negative for cough and shortness of breath.    Cardiovascular: Negative for chest pain and leg swelling.   Gastrointestinal: Negative for abdominal pain, blood in stool, constipation, diarrhea and nausea.   Genitourinary: Negative for dysuria and hematuria.   Musculoskeletal: Negative for arthralgias and back pain.   Skin: Negative for rash.   Neurological: Negative for weakness and light-headedness.       Objective:     ECOG 1  Vitals:    04/27/17 0809   BP: 124/76   BP Location: Right arm   Patient Position: Sitting   BP Method: Manual   Pulse: 87   Temp: 98.2 °F (36.8 °C)   TempSrc: Oral   SpO2: 99%   Weight: 82.5 kg (181 lb 14.1 oz)       Physical Exam   Constitutional: He is oriented to person, place, and time. He appears well-developed and well-nourished.   HENT:   Head: Normocephalic.   Mouth/Throat: Oropharynx is clear and moist. No oropharyngeal exudate.   Eyes: No scleral icterus.   Neck: Normal range of motion. Neck supple. No thyromegaly present.   Cardiovascular: Normal rate, regular rhythm and normal heart sounds.    No murmur heard.  Pulmonary/Chest: Effort normal and breath sounds normal. He has no wheezes. He has no rales.   Abdominal: Soft. Bowel sounds are normal. He exhibits no distension and no mass. There is no hepatosplenomegaly. There is no tenderness. There is no rebound and no guarding.   Musculoskeletal: Normal range of motion. He exhibits no edema.   Lymphadenopathy:     He has no cervical adenopathy.     He has axillary adenopathy (stable).        Right: No  supraclavicular adenopathy present.        Left: No supraclavicular adenopathy present.   Neurological: He is alert and oriented to person, place, and time. No cranial nerve deficit.   Skin: No rash noted. No erythema.   Psychiatric: He has a normal mood and affect.       Lab Results   Component Value Date    WBC 3.54 (L) 04/24/2017    HGB 12.8 (L) 04/24/2017    HCT 38.8 (L) 04/24/2017    MCV 84 04/24/2017     04/24/2017           PET/CT 1/22/2016 Findings consistent with interval significant improvement in the neoplastic disease within the chest indicated by disappearance of a hypermetabolic right subpectoral lymph node and shrinkage/decreased metabolism of bilateral axillary lymph nodes.          Results for LOUISE LONDON (MRN [id number]) as of 10/27/2016 08:29   Ref. Range 4/22/2016 08:15 7/22/2016 08:50 10/24/2016 07:49   LD Latest Ref Range: 110 - 260 U/L 200 196 204     Results for LOUISE LONDON (MRN [id number]) as of 1/27/2017 09:17   Ref. Range 1/24/2017 08:46   TSH Latest Ref Range: 0.400 - 4.000 uIU/mL 3.278         Results for LOUISE LONDON (MRN [id number]) as of 4/27/2017 08:17   Ref. Range 4/24/2017 08:25   LD Latest Ref Range: 110 - 260 U/L 200     Assessment:       1. Non-Hodgkin's lymphoma, unspecified body region, unspecified non-Hodgkin lymphoma type    2. Essential hypertension, benign        Plan:     1 Pt  clinically stable    Hx of Hodgkin's lymphoma stage II >20 years ago treated with mantle radiation at [facility]   He has history of stage IV large cell lymphoma, subtype was unclear with some aggressive features and he completed 6 cycles of R-CHOP therapy  9/2013.  PETCT 2014 -axillary mass significantly improved: no has some residual disease in the area but more concerning is the small node on the other side SUV values are still very very low to be suggestive (3.0)    Pt seen by Dr. Alvarez and determined He has DLBCL  with some unusual features and aggressive features  Axillary mass significantly improved: no has some residual disease in the area but more concerning is the small node on the other sideSUV values are still very very low to be suggestive (3.0) max After his 6 cycles of therapy his scans were never completely negative and he has had low level activity, mostly at his right axilla, but recently at the left axilla and in the retroperitoneal area.      PET findings 1/2016   Findings consistent with interval significant improvement in the neoplastic disease within the chest indicated by disappearance of a hypermetabolic right subpectoral lymph node and shrinkage/decreased metabolism of bilateral axillary lymph nodes.     It is determined,he will be followed without any more scans unless his symptoms, blood counts or lymph node exam dictate reevaluation.   If/when he has a clear relapse we will need a new biopsy to define his pathology      CBC reveals mild intermittent leukopenia, nl Hb and nl plt count  LDH  Remains nl    If he develops sig  leukopenia or sig anemia, plan marrow  Remains Asymptomatic  Surveillance PET imaging 1/2016  - sig improvement   Cbc,cmp, LDH, B-2 microglobulin prior to f/u  TSH wnl        2. Well-controlled      Cont anti-hypertensive meds    CBC, CMP, LDH , B-2.microglobulin prior to f/u  4mos  Pt knows to f/u sooner if problems should arise in the interim        Cc:Tyler Do MD

## 2017-04-27 NOTE — MR AVS SNAPSHOT
[facility]  [address] [place] [address]  Phone: [phone]                  Long Najera   2017 8:00 AM   Office Visit    Description:  Male : 1945   Provider:  Jennifer Cao MD   [facility]           Reason for Visit     Follow-up           Diagnoses this Visit        Comments    Non-Hodgkin's lymphoma, unspecified body region, unspecified non-Hodgkin lymphoma type    -  Primary     Essential hypertension, benign                To Do List           Future Appointments        Provider Department Dept Phone    2017 8:45 AM CHAIR 02 [facility] [phone]    2017 8:00 AM CHAIR 03 [facility] [phone]      Goals (5 Years of Data)     None      Follow-Up and Disposition     Return in about 4 months (around 2017).      [facility]     [facility] Nurse Care Line -  Assistance  Unless otherwise directed by your provider, please contact [facility], our nurse care line that is available for  assistance.     Registered nurses in the [facility] provide: appointment scheduling, clinical advisement, health education, and other advisory services.  Call: [phone] (toll free)               Medications           Message regarding Medications     Verify the changes and/or additions to your medication regime listed below are the same as discussed with your clinician today.  If any of these changes or additions are incorrect, please notify your healthcare provider.             Verify that the below list of medications is an accurate representation of the medications you are currently taking.  If none reported, the list may be blank. If incorrect, please contact your healthcare provider. Carry this list with you in case of emergency.           Current Medications     aspirin (ECOTRIN) 81 MG EC tablet Take 1 tablet (81 mg total) by mouth  once daily.    atorvastatin (LIPITOR) 20 MG tablet Take 1 tablet (20 mg total) by mouth once daily.    lisinopril-hydrochlorothiazide (PRINZIDE,ZESTORETIC) 10-12.5 mg per tablet Take 1 tablet by mouth once daily.    sildenafil (VIAGRA) 100 MG tablet Take 0.5 tablets (50 mg total) by mouth daily as needed.           Clinical Reference Information           Your Vitals Were     BP Pulse Temp Weight SpO2 BMI    124/76 (BP Location: Right arm, Patient Position: Sitting, BP Method: Manual) 87 98.2 °F (36.8 °C) (Oral) 82.5 kg (181 lb 14.1 oz) 99% 26.1 kg/m2      Blood Pressure          Most Recent Value    BP  124/76      Allergies as of 4/27/2017     Tylenol-codeine [Acetaminophen-codeine]      Immunizations Administered on Date of Encounter - 4/27/2017     None      Language Assistance Services     ATTENTION: Language assistance services are available, free of charge. Please call [phone].      ATENCIÓN: Si habla meche, tiene a knox disposición servicios gratuitos de asistencia lingüística. Llame al [phone].     CHÚ Ý: N?u b?n nói Ti?ng Vi?t, có các d?ch v? h? tr? ngôn ng? mi?n phí dành cho b?n. G?i s? [phone].         [facility] complies with applicable Federal civil rights laws and does not discriminate on the basis of race, color, national origin, age, disability, or sex.

## 2017-05-09 PROBLEM — N40.1 BENIGN NON-NODULAR PROSTATIC HYPERPLASIA WITH LOWER URINARY TRACT SYMPTOMS: Status: ACTIVE | Noted: 2017-05-09

## 2017-05-25 ENCOUNTER — INFUSION (OUTPATIENT)
Dept: INFUSION THERAPY | Facility: HOSPITAL | Age: 72
End: 2017-05-25
Attending: INTERNAL MEDICINE
Payer: MEDICARE

## 2017-05-25 DIAGNOSIS — C85.90 LYMPHOMA: Primary | ICD-10-CM

## 2017-05-25 PROCEDURE — 25000003 PHARM REV CODE 250: Performed by: INTERNAL MEDICINE

## 2017-05-25 PROCEDURE — 96523 IRRIG DRUG DELIVERY DEVICE: CPT

## 2017-05-25 RX ORDER — HEPARIN 100 UNIT/ML
500 SYRINGE INTRAVENOUS
Status: COMPLETED | OUTPATIENT
Start: 2017-05-25 | End: 2017-05-25

## 2017-05-25 RX ORDER — SODIUM CHLORIDE 0.9 % (FLUSH) 0.9 %
10 SYRINGE (ML) INJECTION
Status: DISCONTINUED | OUTPATIENT
Start: 2017-05-25 | End: 2017-05-25 | Stop reason: HOSPADM

## 2017-05-25 RX ADMIN — SODIUM CHLORIDE, PRESERVATIVE FREE 10 ML: 5 INJECTION INTRAVENOUS at 08:05

## 2017-05-25 RX ADMIN — HEPARIN 500 UNITS: 100 SYRINGE at 08:05

## 2017-07-06 ENCOUNTER — INFUSION (OUTPATIENT)
Dept: INFUSION THERAPY | Facility: HOSPITAL | Age: 72
End: 2017-07-06
Attending: INTERNAL MEDICINE
Payer: MEDICARE

## 2017-07-06 DIAGNOSIS — C85.90 LYMPHOMA: Primary | ICD-10-CM

## 2017-07-06 PROCEDURE — 96523 IRRIG DRUG DELIVERY DEVICE: CPT

## 2017-07-06 PROCEDURE — 25000003 PHARM REV CODE 250: Performed by: INTERNAL MEDICINE

## 2017-07-06 RX ORDER — HEPARIN 100 UNIT/ML
500 SYRINGE INTRAVENOUS
Status: COMPLETED | OUTPATIENT
Start: 2017-07-06 | End: 2017-07-06

## 2017-07-06 RX ORDER — SODIUM CHLORIDE 0.9 % (FLUSH) 0.9 %
10 SYRINGE (ML) INJECTION
Status: DISCONTINUED | OUTPATIENT
Start: 2017-07-06 | End: 2017-07-06 | Stop reason: HOSPADM

## 2017-07-06 RX ADMIN — HEPARIN 500 UNITS: 100 SYRINGE at 08:07

## 2017-07-06 RX ADMIN — SODIUM CHLORIDE, PRESERVATIVE FREE 10 ML: 5 INJECTION INTRAVENOUS at 08:07

## 2017-08-10 ENCOUNTER — INFUSION (OUTPATIENT)
Dept: INFUSION THERAPY | Facility: HOSPITAL | Age: 72
End: 2017-08-10
Attending: INTERNAL MEDICINE
Payer: MEDICARE

## 2017-08-10 DIAGNOSIS — C85.90 LYMPHOMA: Primary | ICD-10-CM

## 2017-08-10 PROCEDURE — A4216 STERILE WATER/SALINE, 10 ML: HCPCS | Performed by: INTERNAL MEDICINE

## 2017-08-10 PROCEDURE — 63600175 PHARM REV CODE 636 W HCPCS: Performed by: INTERNAL MEDICINE

## 2017-08-10 PROCEDURE — 25000003 PHARM REV CODE 250: Performed by: INTERNAL MEDICINE

## 2017-08-10 PROCEDURE — 96523 IRRIG DRUG DELIVERY DEVICE: CPT

## 2017-08-10 RX ORDER — HEPARIN 100 UNIT/ML
500 SYRINGE INTRAVENOUS
Status: COMPLETED | OUTPATIENT
Start: 2017-08-10 | End: 2017-08-10

## 2017-08-10 RX ORDER — SODIUM CHLORIDE 0.9 % (FLUSH) 0.9 %
10 SYRINGE (ML) INJECTION
Status: DISCONTINUED | OUTPATIENT
Start: 2017-08-10 | End: 2017-08-10 | Stop reason: HOSPADM

## 2017-08-10 RX ADMIN — SODIUM CHLORIDE, PRESERVATIVE FREE 10 ML: 5 INJECTION INTRAVENOUS at 08:08

## 2017-08-10 RX ADMIN — HEPARIN 500 UNITS: 100 SYRINGE at 08:08

## 2017-08-28 PROBLEM — I10 HTN, GOAL BELOW 140/90: Status: ACTIVE | Noted: 2017-08-28

## 2017-08-28 PROBLEM — E78.5 HYPERLIPIDEMIA LDL GOAL <130: Status: ACTIVE | Noted: 2017-08-28

## 2017-09-04 ENCOUNTER — LAB VISIT (OUTPATIENT)
Dept: LAB | Facility: HOSPITAL | Age: 72
End: 2017-09-04
Attending: INTERNAL MEDICINE
Payer: MEDICARE

## 2017-09-04 DIAGNOSIS — C85.90 NON-HODGKIN'S LYMPHOMA, UNSPECIFIED BODY REGION, UNSPECIFIED NON-HODGKIN LYMPHOMA TYPE: ICD-10-CM

## 2017-09-04 LAB
ALBUMIN SERPL BCP-MCNC: 3.4 G/DL
ALP SERPL-CCNC: 85 U/L
ALT SERPL W/O P-5'-P-CCNC: 23 U/L
ANION GAP SERPL CALC-SCNC: 6 MMOL/L
AST SERPL-CCNC: 23 U/L
BASOPHILS # BLD AUTO: 0.01 K/UL
BASOPHILS NFR BLD: 0.2 %
BILIRUB SERPL-MCNC: 0.5 MG/DL
BUN SERPL-MCNC: 19 MG/DL
CALCIUM SERPL-MCNC: 9 MG/DL
CHLORIDE SERPL-SCNC: 105 MMOL/L
CO2 SERPL-SCNC: 31 MMOL/L
CREAT SERPL-MCNC: 1.1 MG/DL
DIFFERENTIAL METHOD: ABNORMAL
EOSINOPHIL # BLD AUTO: 0.2 K/UL
EOSINOPHIL NFR BLD: 4 %
ERYTHROCYTE [DISTWIDTH] IN BLOOD BY AUTOMATED COUNT: 15 %
EST. GFR  (AFRICAN AMERICAN): >60 ML/MIN/1.73 M^2
EST. GFR  (NON AFRICAN AMERICAN): >60 ML/MIN/1.73 M^2
GLUCOSE SERPL-MCNC: 104 MG/DL
HCT VFR BLD AUTO: 39.3 %
HGB BLD-MCNC: 13.3 G/DL
LDH SERPL L TO P-CCNC: 217 U/L
LYMPHOCYTES # BLD AUTO: 1.3 K/UL
LYMPHOCYTES NFR BLD: 27.5 %
MCH RBC QN AUTO: 28.3 PG
MCHC RBC AUTO-ENTMCNC: 33.8 G/DL
MCV RBC AUTO: 84 FL
MONOCYTES # BLD AUTO: 0.6 K/UL
MONOCYTES NFR BLD: 11.6 %
NEUTROPHILS # BLD AUTO: 2.7 K/UL
NEUTROPHILS NFR BLD: 56.5 %
PLATELET # BLD AUTO: 264 K/UL
PMV BLD AUTO: 9.2 FL
POTASSIUM SERPL-SCNC: 3.9 MMOL/L
PROT SERPL-MCNC: 6.9 G/DL
RBC # BLD AUTO: 4.7 M/UL
SODIUM SERPL-SCNC: 142 MMOL/L
WBC # BLD AUTO: 4.76 K/UL

## 2017-09-04 PROCEDURE — 80053 COMPREHEN METABOLIC PANEL: CPT

## 2017-09-04 PROCEDURE — 83615 LACTATE (LD) (LDH) ENZYME: CPT

## 2017-09-04 PROCEDURE — 85025 COMPLETE CBC W/AUTO DIFF WBC: CPT

## 2017-09-04 PROCEDURE — 36415 COLL VENOUS BLD VENIPUNCTURE: CPT

## 2017-09-07 ENCOUNTER — OFFICE VISIT (OUTPATIENT)
Dept: HEMATOLOGY/ONCOLOGY | Facility: CLINIC | Age: 72
End: 2017-09-07
Payer: MEDICARE

## 2017-09-07 VITALS
HEIGHT: 70 IN | HEART RATE: 90 BPM | OXYGEN SATURATION: 98 % | WEIGHT: 183.88 LBS | BODY MASS INDEX: 26.33 KG/M2 | SYSTOLIC BLOOD PRESSURE: 118 MMHG | TEMPERATURE: 99 F | DIASTOLIC BLOOD PRESSURE: 73 MMHG

## 2017-09-07 DIAGNOSIS — I10 ESSENTIAL HYPERTENSION, BENIGN: ICD-10-CM

## 2017-09-07 DIAGNOSIS — C85.90 NON-HODGKIN'S LYMPHOMA, UNSPECIFIED BODY REGION, UNSPECIFIED NON-HODGKIN LYMPHOMA TYPE: Primary | ICD-10-CM

## 2017-09-07 PROCEDURE — 1126F AMNT PAIN NOTED NONE PRSNT: CPT | Mod: S$GLB,,, | Performed by: INTERNAL MEDICINE

## 2017-09-07 PROCEDURE — 3078F DIAST BP <80 MM HG: CPT | Mod: S$GLB,,, | Performed by: INTERNAL MEDICINE

## 2017-09-07 PROCEDURE — 99999 PR PBB SHADOW E&M-EST. PATIENT-LVL III: CPT | Mod: PBBFAC,,, | Performed by: INTERNAL MEDICINE

## 2017-09-07 PROCEDURE — 99213 OFFICE O/P EST LOW 20 MIN: CPT | Mod: S$GLB,,, | Performed by: INTERNAL MEDICINE

## 2017-09-07 PROCEDURE — 3008F BODY MASS INDEX DOCD: CPT | Mod: S$GLB,,, | Performed by: INTERNAL MEDICINE

## 2017-09-07 PROCEDURE — 1159F MED LIST DOCD IN RCRD: CPT | Mod: S$GLB,,, | Performed by: INTERNAL MEDICINE

## 2017-09-07 PROCEDURE — 3074F SYST BP LT 130 MM HG: CPT | Mod: S$GLB,,, | Performed by: INTERNAL MEDICINE

## 2017-09-07 NOTE — PROGRESS NOTES
Subjective:       Patient ID: Long Najera is a 71 y.o. male.    Chief Complaint: Follow-up    HPI  Mr. Najera comes to the clinic for f/u for hx of  NHL.  Hodgkins lymphoma stage II 22 years ago treated with mantle radiation at [facility]   He was diagnosed with stage IV large cell lymphoma, though the subtype was unclear, and he completed 6 cycles of R-CHOP therapy to 9/2013      Pt previously followed by Dr. Harris.   Last f/u with Dr. Harris 2014.  Pt also followed by Dr. Alvarez    Today, pt doing well.  Appetite and weight stable  No fever/night sweats/wt loss  No fatigue  No rashes  No recent infections  No SOB/CP/lightheadedness      Colonoscopy 2-3 yrs ago at [facility] -polyps pathologically benign      PET/CT 1/22/2016 Findings consistent with interval significant improvement in the neoplastic disease within the chest indicated by disappearance of a hypermetabolic right subpectoral lymph node and shrinkage/decreased metabolism of bilateral axillary lymph nodes.        Lymphoma history:  His past medical history is notable for Hodgkins lymphoma stage II 21 years ago treated with mantle radiation at [facility] where he worked at the time.  He was then without symptoms until 2/2013 when he noted the onset of a right axillary node which progressively increased accompanied by the onset of fevers, weight loss and drenching night sweats.  He presented for evaluation in 4/2013 and underwent right axillary lymph node core biopsy 3/7/13 revealing suspicious but nondiagnostic findings of scattered large atypical B lymphocytes that expressed CD20, pax-5 but not CD10, CD15 or CD30. Subsequent complete lymph node biopsy 4/15/13 was reviewed at [facility] finding showing a diffuse scatter involvement of large cells in a background of granulomatous and inflamed tissue. The large cells were CD20 negative but they did have uniform expression of other B markers including Oct-2 and CD45. The absence of CD30, MUM-1  and EBV (by in-situ hybridization) makes Hodgkins less likely. 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  Bone marrow 5/1/13 cellularity was 70% with focal involvement of large cell lymphoma (10-16% overall) which expressed CD20, pax-5, CD45 and Ki-67 with normal cytogenetics and FISH for abnormalities of bcl6, myc, bcl2 and t(11;14) were negative.      Hence, he was diagnosed with stage IV large cell lymphoma, though the subtype was unclear, and he completed 6 cycles of R-CHOP therapy  9/2013.  Since completing his therapy he has been feeling well and progressively stronger.  He has been without sweats, weight loss or fever since early in his therapy last year.  Some relative increase in dyspnea since his therapy last year but stable since then and he feels as if it's getting somewhat better recently (in line with his resolving anemia).      PET-CT 8/30/13 just after finishing therapy showed the largest right axillary node now measures 3.0 x 2.8 cm compared to 3.5 x 3.2 cm previously and has a max SUV of 2.9 compared to 4.0 previously. The hypermetabolic tumor volume of this node is 21.1 cc.     PET-CT 4/25/14 showed a similar appearing soft tissue focus within the right axilla stable in size from prior examination (in January) with slight decrease in hypermetabolic activity demonstrating a Max SUV of 2.1 on today's examination (previously 2.5). However, there is evidence of recurrent disease with interval increase in hypermetabolic activity involving a smaller right retropectoral lymph node demonstrating a max SUV of 2.8 (previously 2.3). Additionally, there is now contralateral disease within the left axilla evidenced by a hypermetabolic axillary lymph node demonstrating a max SUV of 3.0. No other areas of focal, abnormally increased FDG activity identified outside of these aforementioned regions. There is otherwise physiologic uptake within the  "periodontal regions, with voiding glandular structures of the head and neck, bilateral renal collecting systems, and bowel. There is a focus of hypermetabolic activity posterior to the proximal humerus representing muscle activity.    Review of Systems   Constitutional: Negative for appetite change, diaphoresis, fever and unexpected weight change.   HENT: Negative for congestion and mouth sores.    Eyes: Negative.    Respiratory: Negative for cough and shortness of breath.    Cardiovascular: Negative for chest pain and leg swelling.   Gastrointestinal: Negative for abdominal pain, blood in stool, constipation, diarrhea and nausea.   Genitourinary: Negative for dysuria and hematuria.   Musculoskeletal: Negative for arthralgias and back pain.   Skin: Negative for rash.   Neurological: Negative for weakness and light-headedness.       Objective:     ECOG 1  Vitals:    09/07/17 0812   BP: 118/73   BP Location: Left arm   Patient Position: Sitting   BP Method: Medium (Automatic)   Pulse: 90   Temp: 98.5 °F (36.9 °C)   TempSrc: Oral   SpO2: 98%   Weight: 83.4 kg (183 lb 13.8 oz)   Height: 5' 10" (1.778 m)       Physical Exam   Constitutional: He is oriented to person, place, and time. He appears well-developed and well-nourished.   HENT:   Head: Normocephalic.   Mouth/Throat: Oropharynx is clear and moist. No oropharyngeal exudate.   Eyes: No scleral icterus.   Neck: Normal range of motion. Neck supple. No thyromegaly present.   Cardiovascular: Normal rate, regular rhythm and normal heart sounds.    No murmur heard.  Pulmonary/Chest: Effort normal and breath sounds normal. He has no wheezes. He has no rales.   Abdominal: Soft. Bowel sounds are normal. He exhibits no distension and no mass. There is no hepatosplenomegaly. There is no tenderness. There is no rebound and no guarding.   Musculoskeletal: Normal range of motion. He exhibits no edema.   Lymphadenopathy:     He has no cervical adenopathy.     He has axillary " adenopathy (stable).        Right: No supraclavicular adenopathy present.        Left: No supraclavicular adenopathy present.   Neurological: He is alert and oriented to person, place, and time. No cranial nerve deficit.   Skin: No rash noted. No erythema.   Psychiatric: He has a normal mood and affect.       Lab Results   Component Value Date    WBC 4.76 09/04/2017    HGB 13.3 (L) 09/04/2017    HCT 39.3 (L) 09/04/2017    MCV 84 09/04/2017     09/04/2017           PET/CT 1/22/2016 Findings consistent with interval significant improvement in the neoplastic disease within the chest indicated by disappearance of a hypermetabolic right subpectoral lymph node and shrinkage/decreased metabolism of bilateral axillary lymph nodes.          Results for LOUISE LONDON (MRN [id number]) as of 10/27/2016 08:29   Ref. Range 4/22/2016 08:15 7/22/2016 08:50 10/24/2016 07:49   LD Latest Ref Range: 110 - 260 U/L 200 196 204     Results for LOUISE LONDON (MRN [id number]) as of 1/27/2017 09:17   Ref. Range 1/24/2017 08:46   TSH Latest Ref Range: 0.400 - 4.000 uIU/mL 3.278       Results for LOUISE LONDON (MRN [id number]) as of 9/7/2017 08:20   Ref. Range 9/4/2017 07:34   LD Latest Ref Range: 110 - 260 U/L 217         Assessment:       1. Non-Hodgkin's lymphoma, unspecified body region, unspecified non-Hodgkin lymphoma type    2. Essential hypertension, benign        Plan:     1 Pt  clinically stable    Hx of Hodgkin's lymphoma stage II >20 years ago treated with mantle radiation at [facility]   He has history of stage IV large cell lymphoma, subtype was unclear with some aggressive features and he completed 6 cycles of R-CHOP therapy  9/2013.  PETCT 2014 -axillary mass significantly improved: no has some residual disease in the area but more concerning is the small node on the other side SUV values are still very very low to be suggestive (3.0)    Pt seen by Dr. Alvarez and determined He has DLBCL  with some unusual  features and aggressive features Axillary mass significantly improved: no has some residual disease in the area but more concerning is the small node on the other sideSUV values are still very very low to be suggestive (3.0) max After his 6 cycles of therapy his scans were never completely negative and he has had low level activity, mostly at his right axilla, but recently at the left axilla and in the retroperitoneal area.      PET findings 1/2016   Findings consistent with interval significant improvement in the neoplastic disease within the chest indicated by disappearance of a hypermetabolic right subpectoral lymph node and shrinkage/decreased metabolism of bilateral axillary lymph nodes.     It is determined,he will be followed without any more scans unless his symptoms, blood counts or lymph node exam dictate reevaluation.   If/when he has a clear relapse we will need a new biopsy to define his pathology      CBC reveals nl blood counts  LDH  Remains nl    If he develops sig  leukopenia or sig anemia, plan marrow  He Remains Asymptomatic  Surveillance PET imaging 1/2016  - sig improvement -stable pulm nodulesl        2. Well-controlled      Cont anti-hypertensive meds    CBC, CMP, LDH , B-2.microglobulin prior to f/u  4mos  Pt knows to f/u sooner if problems should arise in the interim        Cc:Tyler Do MD

## 2017-09-14 ENCOUNTER — INFUSION (OUTPATIENT)
Dept: INFUSION THERAPY | Facility: HOSPITAL | Age: 72
End: 2017-09-14
Attending: INTERNAL MEDICINE
Payer: MEDICARE

## 2017-09-14 DIAGNOSIS — C85.90 LYMPHOMA: Primary | ICD-10-CM

## 2017-09-14 PROCEDURE — A4216 STERILE WATER/SALINE, 10 ML: HCPCS | Performed by: INTERNAL MEDICINE

## 2017-09-14 PROCEDURE — 25000003 PHARM REV CODE 250: Performed by: INTERNAL MEDICINE

## 2017-09-14 PROCEDURE — 63600175 PHARM REV CODE 636 W HCPCS: Performed by: INTERNAL MEDICINE

## 2017-09-14 PROCEDURE — 96523 IRRIG DRUG DELIVERY DEVICE: CPT

## 2017-09-14 RX ORDER — SODIUM CHLORIDE 0.9 % (FLUSH) 0.9 %
10 SYRINGE (ML) INJECTION
Status: DISCONTINUED | OUTPATIENT
Start: 2017-09-14 | End: 2017-09-14 | Stop reason: HOSPADM

## 2017-09-14 RX ORDER — HEPARIN 100 UNIT/ML
500 SYRINGE INTRAVENOUS
Status: COMPLETED | OUTPATIENT
Start: 2017-09-14 | End: 2017-09-14

## 2017-09-14 RX ADMIN — HEPARIN 500 UNITS: 100 SYRINGE at 08:09

## 2017-09-14 RX ADMIN — SODIUM CHLORIDE, PRESERVATIVE FREE 10 ML: 5 INJECTION INTRAVENOUS at 08:09

## 2017-10-19 ENCOUNTER — INFUSION (OUTPATIENT)
Dept: INFUSION THERAPY | Facility: HOSPITAL | Age: 72
End: 2017-10-19
Attending: INTERNAL MEDICINE
Payer: MEDICARE

## 2017-10-19 DIAGNOSIS — C85.90 LYMPHOMA: Primary | ICD-10-CM

## 2017-10-19 PROCEDURE — 25000003 PHARM REV CODE 250: Performed by: INTERNAL MEDICINE

## 2017-10-19 PROCEDURE — 96523 IRRIG DRUG DELIVERY DEVICE: CPT

## 2017-10-19 PROCEDURE — 63600175 PHARM REV CODE 636 W HCPCS: Performed by: INTERNAL MEDICINE

## 2017-10-19 PROCEDURE — A4216 STERILE WATER/SALINE, 10 ML: HCPCS | Performed by: INTERNAL MEDICINE

## 2017-10-19 RX ORDER — HEPARIN 100 UNIT/ML
500 SYRINGE INTRAVENOUS
Status: COMPLETED | OUTPATIENT
Start: 2017-10-19 | End: 2017-10-19

## 2017-10-19 RX ORDER — SODIUM CHLORIDE 0.9 % (FLUSH) 0.9 %
10 SYRINGE (ML) INJECTION
Status: DISCONTINUED | OUTPATIENT
Start: 2017-10-19 | End: 2017-10-19 | Stop reason: HOSPADM

## 2017-10-19 RX ADMIN — SODIUM CHLORIDE, PRESERVATIVE FREE 10 ML: 5 INJECTION INTRAVENOUS at 08:10

## 2017-10-19 RX ADMIN — HEPARIN 500 UNITS: 100 SYRINGE at 08:10

## 2017-10-19 NOTE — PLAN OF CARE
Problem: Patient Care Overview  Goal: Plan of Care Review  Outcome: Ongoing (interventions implemented as appropriate)  Patient tolerated monthly potacath flush. Patient received discharge instructions and verbalized understanding.

## 2017-11-16 ENCOUNTER — INFUSION (OUTPATIENT)
Dept: INFUSION THERAPY | Facility: HOSPITAL | Age: 72
End: 2017-11-16
Attending: INTERNAL MEDICINE
Payer: MEDICARE

## 2017-11-16 DIAGNOSIS — C85.90 LYMPHOMA: Primary | ICD-10-CM

## 2017-11-16 PROCEDURE — A4216 STERILE WATER/SALINE, 10 ML: HCPCS | Performed by: INTERNAL MEDICINE

## 2017-11-16 PROCEDURE — 96523 IRRIG DRUG DELIVERY DEVICE: CPT

## 2017-11-16 PROCEDURE — 63600175 PHARM REV CODE 636 W HCPCS: Performed by: INTERNAL MEDICINE

## 2017-11-16 PROCEDURE — 25000003 PHARM REV CODE 250: Performed by: INTERNAL MEDICINE

## 2017-11-16 RX ORDER — HEPARIN 100 UNIT/ML
500 SYRINGE INTRAVENOUS
Status: COMPLETED | OUTPATIENT
Start: 2017-11-16 | End: 2017-11-16

## 2017-11-16 RX ORDER — SODIUM CHLORIDE 0.9 % (FLUSH) 0.9 %
10 SYRINGE (ML) INJECTION
Status: DISCONTINUED | OUTPATIENT
Start: 2017-11-16 | End: 2017-11-16 | Stop reason: HOSPADM

## 2017-11-16 RX ADMIN — SODIUM CHLORIDE, PRESERVATIVE FREE 10 ML: 5 INJECTION INTRAVENOUS at 08:11

## 2017-11-16 RX ADMIN — HEPARIN 500 UNITS: 100 SYRINGE at 08:11

## 2017-11-27 ENCOUNTER — OFFICE VISIT (OUTPATIENT)
Dept: URGENT CARE | Facility: CLINIC | Age: 72
End: 2017-11-27
Payer: MEDICARE

## 2017-11-27 VITALS
SYSTOLIC BLOOD PRESSURE: 143 MMHG | HEART RATE: 88 BPM | TEMPERATURE: 99 F | BODY MASS INDEX: 26.2 KG/M2 | OXYGEN SATURATION: 98 % | WEIGHT: 183 LBS | HEIGHT: 70 IN | DIASTOLIC BLOOD PRESSURE: 80 MMHG

## 2017-11-27 DIAGNOSIS — L13.9 BULLOUS DERMATITIS: Primary | ICD-10-CM

## 2017-11-27 PROCEDURE — 99214 OFFICE O/P EST MOD 30 MIN: CPT | Mod: S$GLB,,, | Performed by: FAMILY MEDICINE

## 2017-11-27 RX ORDER — PREDNISONE 20 MG/1
40 TABLET ORAL DAILY
Qty: 10 TABLET | Refills: 0 | Status: SHIPPED | OUTPATIENT
Start: 2017-11-27 | End: 2017-12-02

## 2017-11-27 RX ORDER — MUPIROCIN 20 MG/G
OINTMENT TOPICAL
Qty: 22 G | Refills: 1 | Status: SHIPPED | OUTPATIENT
Start: 2017-11-27

## 2017-11-27 NOTE — PATIENT INSTRUCTIONS
Understanding Bullous Pemphigoid    Bullous pemphigoid is a long-term (chronic) skin disease. It mostly affects people ages 60 and older. It causes large blisters to form. These blisters may be on one part of the body or all over. They often appear on the arms, legs, groin, chest, and stomach. Sores may sometimes occur in the mouth, too.  How to say it  BULL-us [id number]-muut   What causes bullous pemphigoid?  Bullous pemphigoid is an autoimmune disease. Thats when the immune system attacks healthy parts of the body. In this case, it attacks the proteins that join the skin cells to one another. The disease may also be caused by some medicines, such as penicillin.  Symptoms of bullous pemphigoid  Bullous pemphigoid may first look like hives or eczema, because it can be red and itchy. Large blisters may then form. These blisters are often filled with a clear liquid. They may feel very itchy. The blisters may pop. But they usually dont leave any scars. They may go away and come back again.  Treatment for bullous pemphigoid  The blisters from this skin disease may go away and come back. So treatment is often needed for a few years. Treatment options include:  · Skin care. Using mild soap and anti-itch creams may help with symptoms.  · Steroids. Steroids are the main treatment option for this disease. They may be creams or ointments that you put on your skin. Or they may be in pill or liquid form (oral). They can ease itching and stop new blisters from forming. But oral steroids may have side effects.  · Other medicines. If you cant take a steroid, other medicines may work. Some of these treat the disease by slowing down the immune system.  Possible complications of bullous pemphigoid  ·  Skin infection  When to call your healthcare provider  Call your healthcare provider right away if you have any of these:  · Fever of 100.4°F (38°C) or higher, or as directed  · Pain that gets worse  · Symptoms that dont get better,  or get worse  · New symptoms   Date Last Reviewed: 5/1/2016  © 6746-8098 [employer]. [address] [place] [address], [place] [address]. All rights reserved. This information is not intended as a substitute for professional medical care. Always follow your healthcare professional's instructions.

## 2017-11-27 NOTE — PROGRESS NOTES
"Subjective:       Patient ID: Long Najera is a 72 y.o. male.    Vitals:  height is 5' 10" (1.778 m) and weight is 83 kg (183 lb). His temperature is 99 °F (37.2 °C). His blood pressure is 143/80 (abnormal) and his pulse is 88. His oxygen saturation is 98%.     Chief Complaint: Abscess    Abscess   Chronicity:  NewProgression Since Onset: gradually worsening  Size:  3-5cm  Location:  Head/neck, shoulder/arm, torso, leg and ano-genital  Characteristics: draining, itching, dryness, peeling and swelling    Pain Scale:  5/10  Treatments Tried:  Topical antibiotics  Relieved by:  Draining/squeezing    Review of Systems   Skin: Positive for dry skin, flushing and itching.   All other systems reviewed and are negative.      Objective:      Physical Exam   Constitutional: He is oriented to person, place, and time. He appears well-developed.   HENT:   Head: Normocephalic.   Nose: Nose normal.   Mouth/Throat: Oropharynx is clear and moist.   Eyes: Conjunctivae and EOM are normal. Pupils are equal, round, and reactive to light.   Cardiovascular: Normal rate and regular rhythm.    Pulmonary/Chest: Breath sounds normal.   Abdominal: Bowel sounds are normal.   Neurological: He is alert and oriented to person, place, and time.   Skin:        Multiple bullous lesions on torso and upper ext. With clear fluid       Assessment:       1. Bullous dermatitis        Plan:         Bullous dermatitis  -     predniSONE (DELTASONE) 20 MG tablet; Take 2 tablets (40 mg total) by mouth once daily.  Dispense: 10 tablet; Refill: 0  -     mupirocin (BACTROBAN) 2 % ointment; Apply to affected area 3 times daily  Dispense: 22 g; Refill: 1  -     CULTURE, AEROBIC  (SPECIFY SOURCE)      Patient Instructions       Understanding Bullous Pemphigoid    Bullous pemphigoid is a long-term (chronic) skin disease. It mostly affects people ages 60 and older. It causes large blisters to form. These blisters may be on one part of the body or all over. They " often appear on the arms, legs, groin, chest, and stomach. Sores may sometimes occur in the mouth, too.  How to say it  BULL-us [id number]-blax   What causes bullous pemphigoid?  Bullous pemphigoid is an autoimmune disease. Thats when the immune system attacks healthy parts of the body. In this case, it attacks the proteins that join the skin cells to one another. The disease may also be caused by some medicines, such as penicillin.  Symptoms of bullous pemphigoid  Bullous pemphigoid may first look like hives or eczema, because it can be red and itchy. Large blisters may then form. These blisters are often filled with a clear liquid. They may feel very itchy. The blisters may pop. But they usually dont leave any scars. They may go away and come back again.  Treatment for bullous pemphigoid  The blisters from this skin disease may go away and come back. So treatment is often needed for a few years. Treatment options include:  · Skin care. Using mild soap and anti-itch creams may help with symptoms.  · Steroids. Steroids are the main treatment option for this disease. They may be creams or ointments that you put on your skin. Or they may be in pill or liquid form (oral). They can ease itching and stop new blisters from forming. But oral steroids may have side effects.  · Other medicines. If you cant take a steroid, other medicines may work. Some of these treat the disease by slowing down the immune system.  Possible complications of bullous pemphigoid  ·  Skin infection  When to call your healthcare provider  Call your healthcare provider right away if you have any of these:  · Fever of 100.4°F (38°C) or higher, or as directed  · Pain that gets worse  · Symptoms that dont get better, or get worse  · New symptoms   Date Last Reviewed: 5/1/2016  © 6891-8252 [employer]. [address] [place] [address], [place] [address]. All rights reserved. This information is not intended as a substitute for professional medical  care. Always follow your healthcare professional's instructions.

## 2017-11-29 ENCOUNTER — TELEPHONE (OUTPATIENT)
Dept: URGENT CARE | Facility: CLINIC | Age: 72
End: 2017-11-29

## 2017-12-01 ENCOUNTER — TELEPHONE (OUTPATIENT)
Dept: URGENT CARE | Facility: CLINIC | Age: 72
End: 2017-12-01

## 2017-12-01 LAB — BACTERIA SPEC AEROBE CULT: NORMAL

## 2017-12-01 NOTE — TELEPHONE ENCOUNTER
----- Message from Juan Márquez MD sent at 11/30/2017 11:13 AM CST -----  Please notify patient that labs were within normal limits without significant growth. Please follow up to confirm improvement. Advise patient to follow up with PCP (or specialist) as directed.

## 2017-12-12 ENCOUNTER — LAB VISIT (OUTPATIENT)
Dept: LAB | Facility: HOSPITAL | Age: 72
End: 2017-12-12
Attending: INTERNAL MEDICINE
Payer: MEDICARE

## 2017-12-12 DIAGNOSIS — C85.90 NON-HODGKIN'S LYMPHOMA, UNSPECIFIED BODY REGION, UNSPECIFIED NON-HODGKIN LYMPHOMA TYPE: ICD-10-CM

## 2017-12-12 LAB
ALBUMIN SERPL BCP-MCNC: 3.3 G/DL
ALP SERPL-CCNC: 76 U/L
ALT SERPL W/O P-5'-P-CCNC: 26 U/L
ANION GAP SERPL CALC-SCNC: 7 MMOL/L
AST SERPL-CCNC: 24 U/L
BASOPHILS # BLD AUTO: 0.02 K/UL
BASOPHILS NFR BLD: 0.4 %
BILIRUB SERPL-MCNC: 0.5 MG/DL
BUN SERPL-MCNC: 13 MG/DL
CALCIUM SERPL-MCNC: 9.5 MG/DL
CHLORIDE SERPL-SCNC: 103 MMOL/L
CO2 SERPL-SCNC: 30 MMOL/L
CREAT SERPL-MCNC: 1.2 MG/DL
DIFFERENTIAL METHOD: ABNORMAL
EOSINOPHIL # BLD AUTO: 0.4 K/UL
EOSINOPHIL NFR BLD: 7.8 %
ERYTHROCYTE [DISTWIDTH] IN BLOOD BY AUTOMATED COUNT: 15.5 %
EST. GFR  (AFRICAN AMERICAN): >60 ML/MIN/1.73 M^2
EST. GFR  (NON AFRICAN AMERICAN): >60 ML/MIN/1.73 M^2
GLUCOSE SERPL-MCNC: 103 MG/DL
HCT VFR BLD AUTO: 41.4 %
HGB BLD-MCNC: 13.4 G/DL
LDH SERPL L TO P-CCNC: 216 U/L
LYMPHOCYTES # BLD AUTO: 1.2 K/UL
LYMPHOCYTES NFR BLD: 26.9 %
MCH RBC QN AUTO: 27.5 PG
MCHC RBC AUTO-ENTMCNC: 32.4 G/DL
MCV RBC AUTO: 85 FL
MONOCYTES # BLD AUTO: 0.7 K/UL
MONOCYTES NFR BLD: 15.6 %
NEUTROPHILS # BLD AUTO: 2.3 K/UL
NEUTROPHILS NFR BLD: 49.1 %
PLATELET # BLD AUTO: 291 K/UL
PMV BLD AUTO: 9.2 FL
POTASSIUM SERPL-SCNC: 4.5 MMOL/L
PROT SERPL-MCNC: 6.8 G/DL
RBC # BLD AUTO: 4.87 M/UL
SODIUM SERPL-SCNC: 140 MMOL/L
WBC # BLD AUTO: 4.61 K/UL

## 2017-12-12 PROCEDURE — 83615 LACTATE (LD) (LDH) ENZYME: CPT

## 2017-12-12 PROCEDURE — 80053 COMPREHEN METABOLIC PANEL: CPT

## 2017-12-12 PROCEDURE — 36415 COLL VENOUS BLD VENIPUNCTURE: CPT

## 2017-12-12 PROCEDURE — 85025 COMPLETE CBC W/AUTO DIFF WBC: CPT

## 2017-12-13 ENCOUNTER — INFUSION (OUTPATIENT)
Dept: INFUSION THERAPY | Facility: HOSPITAL | Age: 72
End: 2017-12-13
Attending: INTERNAL MEDICINE
Payer: MEDICARE

## 2017-12-13 ENCOUNTER — OFFICE VISIT (OUTPATIENT)
Dept: HEMATOLOGY/ONCOLOGY | Facility: CLINIC | Age: 72
End: 2017-12-13
Payer: MEDICARE

## 2017-12-13 VITALS
BODY MASS INDEX: 25.48 KG/M2 | HEART RATE: 104 BPM | SYSTOLIC BLOOD PRESSURE: 144 MMHG | WEIGHT: 178 LBS | TEMPERATURE: 98 F | DIASTOLIC BLOOD PRESSURE: 69 MMHG | HEIGHT: 70 IN | OXYGEN SATURATION: 99 %

## 2017-12-13 DIAGNOSIS — C85.90 LYMPHOMA: Primary | ICD-10-CM

## 2017-12-13 DIAGNOSIS — L12.0 BULLOUS PEMPHIGOID: ICD-10-CM

## 2017-12-13 DIAGNOSIS — C85.90 NON-HODGKIN'S LYMPHOMA, UNSPECIFIED BODY REGION, UNSPECIFIED NON-HODGKIN LYMPHOMA TYPE: Primary | ICD-10-CM

## 2017-12-13 PROCEDURE — 63600175 PHARM REV CODE 636 W HCPCS: Performed by: INTERNAL MEDICINE

## 2017-12-13 PROCEDURE — 25000003 PHARM REV CODE 250: Performed by: INTERNAL MEDICINE

## 2017-12-13 PROCEDURE — 99213 OFFICE O/P EST LOW 20 MIN: CPT | Mod: S$GLB,,, | Performed by: INTERNAL MEDICINE

## 2017-12-13 PROCEDURE — A4216 STERILE WATER/SALINE, 10 ML: HCPCS | Performed by: INTERNAL MEDICINE

## 2017-12-13 PROCEDURE — 99999 PR PBB SHADOW E&M-EST. PATIENT-LVL IV: CPT | Mod: PBBFAC,,, | Performed by: INTERNAL MEDICINE

## 2017-12-13 PROCEDURE — 96523 IRRIG DRUG DELIVERY DEVICE: CPT

## 2017-12-13 RX ORDER — HEPARIN 100 UNIT/ML
500 SYRINGE INTRAVENOUS
Status: COMPLETED | OUTPATIENT
Start: 2017-12-13 | End: 2017-12-13

## 2017-12-13 RX ORDER — SODIUM CHLORIDE 0.9 % (FLUSH) 0.9 %
10 SYRINGE (ML) INJECTION
Status: DISCONTINUED | OUTPATIENT
Start: 2017-12-13 | End: 2017-12-13 | Stop reason: HOSPADM

## 2017-12-13 RX ADMIN — SODIUM CHLORIDE, PRESERVATIVE FREE 10 ML: 5 INJECTION INTRAVENOUS at 09:12

## 2017-12-13 RX ADMIN — HEPARIN 500 UNITS: 100 SYRINGE at 09:12

## 2017-12-13 NOTE — PLAN OF CARE
Problem: Patient Care Overview  Goal: Plan of Care Review  Outcome: Ongoing (interventions implemented as appropriate)  Patient tolerated monthly portacath flush. Received discharge instructions and verbalized understanding.

## 2017-12-13 NOTE — PROGRESS NOTES
Subjective:       Patient ID: Long Najera is a 72 y.o. male.    Chief Complaint: Follow-up    HPI  Mr. Najera comes to the clinic for f/u for hx of  NHL.  Hodgkins lymphoma stage II 22 years ago treated with mantle radiation at [facility]   He was diagnosed with stage IV large cell lymphoma, though the subtype was unclear, and he completed 6 cycles of R-CHOP therapy to 9/2013      Pt previously followed by Dr. Harris.   Last f/u with Dr. Harris 2014.  Pt also followed by Dr. Alvarez    Pt reports blistering lesions wihicmainor developed 1 mo ago    He visited Urgent Care and was diagnosed with bullous pemphigoid and treated with bactroban and course of steroid therapy   Pt reports lesions somewhat  improved with steroids but now increasing   Pt reports blisters rupture with clear fluid  He reports itching and lesions are mildly painful    No fevers or night sweats  No dysphagia  Appetite and weight stable   He denies any recent infections  He reports he recently visited dentist and thinks he may have been prescribed some antibiotics but he is uncertain   No SOB/CP/lightheadedness      Colonoscopy 2-3 yrs ago at [facility] -polyps pathologically benign      PET/CT 1/22/2016 Findings consistent with interval significant improvement in the neoplastic disease within the chest indicated by disappearance of a hypermetabolic right subpectoral lymph node and shrinkage/decreased metabolism of bilateral axillary lymph nodes.        Lymphoma history:  His past medical history is notable for Hodgkins lymphoma stage II 21 years ago treated with mantle radiation at [facility] where he worked at the time.  He was then without symptoms until 2/2013 when he noted the onset of a right axillary node which progressively increased accompanied by the onset of fevers, weight loss and drenching night sweats.  He presented for evaluation in 4/2013 and underwent right axillary lymph node core biopsy 3/7/13 revealing suspicious but  nondiagnostic findings of scattered large atypical B lymphocytes that expressed CD20, pax-5 but not CD10, CD15 or CD30. Subsequent complete lymph node biopsy 4/15/13 was reviewed at [facility] finding showing a diffuse scatter involvement of large cells in a background of granulomatous and inflamed tissue. The large cells were CD20 negative but they did have uniform expression of other B markers including Oct-2 and CD45. The absence of CD30, MUM-1 and EBV (by in-situ hybridization) makes Hodgkins less likely. 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  Bone marrow 5/1/13 cellularity was 70% with focal involvement of large cell lymphoma (10-16% overall) which expressed CD20, pax-5, CD45 and Ki-67 with normal cytogenetics and FISH for abnormalities of bcl6, myc, bcl2 and t(11;14) were negative.      Hence, he was diagnosed with stage IV large cell lymphoma, though the subtype was unclear, and he completed 6 cycles of R-CHOP therapy  9/2013.  Since completing his therapy he has been feeling well and progressively stronger.  He has been without sweats, weight loss or fever since early in his therapy last year.  Some relative increase in dyspnea since his therapy last year but stable since then and he feels as if it's getting somewhat better recently (in line with his resolving anemia).      PET-CT 8/30/13 just after finishing therapy showed the largest right axillary node now measures 3.0 x 2.8 cm compared to 3.5 x 3.2 cm previously and has a max SUV of 2.9 compared to 4.0 previously. The hypermetabolic tumor volume of this node is 21.1 cc.     PET-CT 4/25/14 showed a similar appearing soft tissue focus within the right axilla stable in size from prior examination (in January) with slight decrease in hypermetabolic activity demonstrating a Max SUV of 2.1 on today's examination (previously 2.5). However, there is evidence of recurrent disease with interval  "increase in hypermetabolic activity involving a smaller right retropectoral lymph node demonstrating a max SUV of 2.8 (previously 2.3). Additionally, there is now contralateral disease within the left axilla evidenced by a hypermetabolic axillary lymph node demonstrating a max SUV of 3.0. No other areas of focal, abnormally increased FDG activity identified outside of these aforementioned regions. There is otherwise physiologic uptake within the periodontal regions, with voiding glandular structures of the head and neck, bilateral renal collecting systems, and bowel. There is a focus of hypermetabolic activity posterior to the proximal humerus representing muscle activity.    Review of Systems   Constitutional: Negative for appetite change, diaphoresis, fever and unexpected weight change.   HENT: Negative for congestion and mouth sores.    Eyes: Negative.    Respiratory: Negative for cough and shortness of breath.    Cardiovascular: Negative for chest pain and leg swelling.   Gastrointestinal: Negative for abdominal pain, blood in stool, constipation, diarrhea and nausea.   Genitourinary: Negative for dysuria and hematuria.   Musculoskeletal: Negative for arthralgias and back pain.   Skin: Negative for rash.   Neurological: Negative for weakness and light-headedness.       Objective:     ECOG 1  Vitals:    12/13/17 0817   BP: (!) 144/69   BP Location: Left arm   Patient Position: Sitting   BP Method: Medium (Automatic)   Pulse: 104   Temp: 98 °F (36.7 °C)   TempSrc: Oral   SpO2: 99%   Weight: 80.7 kg (178 lb 0.3 oz)   Height: 5' 10" (1.778 m)       Physical Exam   Constitutional: He is oriented to person, place, and time. He appears well-developed and well-nourished.   HENT:   Head: Normocephalic.   Mouth/Throat: Oropharynx is clear and moist. No oropharyngeal exudate.   Eyes: No scleral icterus.   Neck: Normal range of motion. Neck supple. No thyromegaly present.   Cardiovascular: Normal rate, regular rhythm and " normal heart sounds.    No murmur heard.  Pulmonary/Chest: Effort normal and breath sounds normal. He has no wheezes. He has no rales.   Abdominal: Soft. Bowel sounds are normal. He exhibits no distension. There is no hepatosplenomegaly. There is no tenderness.   Musculoskeletal: Normal range of motion. He exhibits no edema.   Lymphadenopathy:     He has no cervical adenopathy.     He has axillary adenopathy (stable).        Right: No supraclavicular adenopathy present.        Left: No supraclavicular adenopathy present.   Neurological: He is alert and oriented to person, place, and time. No cranial nerve deficit.   Skin: No erythema.   Scattered bullous lesions on upper ext, torso and lower extr   Psychiatric: He has a normal mood and affect.       Lab Results   Component Value Date    WBC 4.61 12/12/2017    HGB 13.4 (L) 12/12/2017    HCT 41.4 12/12/2017    MCV 85 12/12/2017     12/12/2017           PET/CT 1/22/2016 Findings consistent with interval significant improvement in the neoplastic disease within the chest indicated by disappearance of a hypermetabolic right subpectoral lymph node and shrinkage/decreased metabolism of bilateral axillary lymph nodes.          Results for LOUISE LONDON (MRN [id number]) as of 10/27/2016 08:29   Ref. Range 4/22/2016 08:15 7/22/2016 08:50 10/24/2016 07:49   LD Latest Ref Range: 110 - 260 U/L 200 196 204     Results for LOUISE LONDON (MRN [id number]) as of 1/27/2017 09:17   Ref. Range 1/24/2017 08:46   TSH Latest Ref Range: 0.400 - 4.000 uIU/mL 3.278       Results for LOUISE LONDON (MRN [id number]) as of 9/7/2017 08:20   Ref. Range 9/4/2017 07:34   LD Latest Ref Range: 110 - 260 U/L 217       Results for LOUISE LONDON (MRN [id number]) as of 12/13/2017 08:32   Ref. Range 9/4/2017 07:34 11/27/2017 16:49 12/12/2017 07:49   LD Latest Ref Range: 110 - 260 U/L 217  216     Assessment:       1. Non-Hodgkin's lymphoma, unspecified body region, unspecified non-Hodgkin  lymphoma type    2. Bullous pemphigoid        Plan:    1-2 Pt  clinically stable    Hx of Hodgkin's lymphoma stage II >20 years ago treated with mantle radiation at [facility]   He has history of stage IV large cell lymphoma, subtype was unclear with some aggressive features and he completed 6 cycles of R-CHOP therapy  9/2013.  PETCT 2014 -axillary mass significantly improved: no has some residual disease in the area but more concerning is the small node on the other side SUV values are still very very low to be suggestive (3.0)    Pt seen by Dr. Alvarez and determined He has DLBCL  with some unusual features and aggressive features Axillary mass significantly improved: no has some residual disease in the area but more concerning is the small node on the other sideSUV values are still very very low to be suggestive (3.0) max After his 6 cycles of therapy his scans were never completely negative and he has had low level activity, mostly at his right axilla, but recently at the left axilla and in the retroperitoneal area.      PET findings 1/2016   Findings consistent with interval significant improvement in the neoplastic disease within the chest indicated by disappearance of a hypermetabolic right subpectoral lymph node and shrinkage/decreased metabolism of bilateral axillary lymph nodes.     It is determined,he will be followed without any more scans unless his symptoms, blood counts or lymph node exam dictate reevaluation.   If/when he has a clear relapse we will need a new biopsy to define his pathology      CBC reveals nl blood counts  LDH  Remains nl    If he develops sig  leukopenia or sig anemia, plan marrow  He Remains Asymptomatic  Surveillance PET imaging 1/2016  - sig improvement -stable pulm nodules    Plan PET imaging ( in light of skin findings-unclear whether any correlation with disease recurrence or drug-induced?)  The relationship between bullous pemphigoid and malignancy is unclear      2.  Ambulatory Referral to Dermatology ASAP for evaluation      Pt treated with steroids and topical antibiotics with minimal relief     Defer therapy to Dermatology     Plan PET/CT imaging     CBC, CMP, LDH , B-2.microglobulin prior to f/u  6wks  Pt knows to f/u sooner if problems should arise in the interim            Cc:Kimberley Ochoa M.D.

## 2018-01-11 ENCOUNTER — HOSPITAL ENCOUNTER (OUTPATIENT)
Dept: RADIOLOGY | Facility: HOSPITAL | Age: 73
Discharge: HOME | End: 2018-01-11
Attending: INTERNAL MEDICINE
Payer: MEDICARE

## 2018-01-11 ENCOUNTER — LAB VISIT (OUTPATIENT)
Dept: LAB | Facility: HOSPITAL | Age: 73
End: 2018-01-11
Attending: INTERNAL MEDICINE
Payer: MEDICARE

## 2018-01-11 DIAGNOSIS — C85.90 NON-HODGKIN'S LYMPHOMA, UNSPECIFIED BODY REGION, UNSPECIFIED NON-HODGKIN LYMPHOMA TYPE: ICD-10-CM

## 2018-01-11 LAB
ALBUMIN SERPL BCP-MCNC: 3.6 G/DL
ALP SERPL-CCNC: 71 U/L
ALT SERPL W/O P-5'-P-CCNC: 65 U/L
ANION GAP SERPL CALC-SCNC: 5 MMOL/L
AST SERPL-CCNC: 31 U/L
BASOPHILS # BLD AUTO: 0.02 K/UL
BASOPHILS NFR BLD: 0.3 %
BILIRUB SERPL-MCNC: 0.8 MG/DL
BUN SERPL-MCNC: 23 MG/DL
CALCIUM SERPL-MCNC: 9.9 MG/DL
CHLORIDE SERPL-SCNC: 100 MMOL/L
CO2 SERPL-SCNC: 35 MMOL/L
CREAT SERPL-MCNC: 1.1 MG/DL
DIFFERENTIAL METHOD: ABNORMAL
EOSINOPHIL # BLD AUTO: 0.1 K/UL
EOSINOPHIL NFR BLD: 0.8 %
ERYTHROCYTE [DISTWIDTH] IN BLOOD BY AUTOMATED COUNT: 15.6 %
EST. GFR  (AFRICAN AMERICAN): >60 ML/MIN/1.73 M^2
EST. GFR  (NON AFRICAN AMERICAN): >60 ML/MIN/1.73 M^2
GLUCOSE SERPL-MCNC: 94 MG/DL
HCT VFR BLD AUTO: 44.5 %
HGB BLD-MCNC: 14.9 G/DL
IGA SERPL-MCNC: 170 MG/DL
IGG SERPL-MCNC: 899 MG/DL
IGM SERPL-MCNC: 139 MG/DL
LDH SERPL L TO P-CCNC: 225 U/L
LYMPHOCYTES # BLD AUTO: 2.9 K/UL
LYMPHOCYTES NFR BLD: 48.7 %
MCH RBC QN AUTO: 28.1 PG
MCHC RBC AUTO-ENTMCNC: 33.5 G/DL
MCV RBC AUTO: 84 FL
MONOCYTES # BLD AUTO: 0.5 K/UL
MONOCYTES NFR BLD: 7.7 %
NEUTROPHILS # BLD AUTO: 2.5 K/UL
NEUTROPHILS NFR BLD: 42.5 %
PLATELET # BLD AUTO: 263 K/UL
PMV BLD AUTO: 8.8 FL
POTASSIUM SERPL-SCNC: 4.5 MMOL/L
PROT SERPL-MCNC: 6.9 G/DL
RBC # BLD AUTO: 5.31 M/UL
SODIUM SERPL-SCNC: 140 MMOL/L
WBC # BLD AUTO: 5.96 K/UL

## 2018-01-11 PROCEDURE — A9552 F18 FDG: HCPCS

## 2018-01-11 PROCEDURE — 80053 COMPREHEN METABOLIC PANEL: CPT

## 2018-01-11 PROCEDURE — 36415 COLL VENOUS BLD VENIPUNCTURE: CPT

## 2018-01-11 PROCEDURE — 78815 PET IMAGE W/CT SKULL-THIGH: CPT | Mod: 26,PS,, | Performed by: RADIOLOGY

## 2018-01-11 PROCEDURE — 78815 PET IMAGE W/CT SKULL-THIGH: CPT | Mod: TC,PS

## 2018-01-11 PROCEDURE — 82784 ASSAY IGA/IGD/IGG/IGM EACH: CPT | Mod: 59

## 2018-01-11 PROCEDURE — 85025 COMPLETE CBC W/AUTO DIFF WBC: CPT

## 2018-01-11 PROCEDURE — 83615 LACTATE (LD) (LDH) ENZYME: CPT

## 2018-01-17 ENCOUNTER — INFUSION (OUTPATIENT)
Dept: INFUSION THERAPY | Facility: HOSPITAL | Age: 73
End: 2018-01-17
Attending: INTERNAL MEDICINE
Payer: MEDICARE

## 2018-01-17 DIAGNOSIS — C85.90 LYMPHOMA: Primary | ICD-10-CM

## 2018-01-17 PROCEDURE — 63600175 PHARM REV CODE 636 W HCPCS: Performed by: INTERNAL MEDICINE

## 2018-01-17 PROCEDURE — 96523 IRRIG DRUG DELIVERY DEVICE: CPT

## 2018-01-17 PROCEDURE — A4216 STERILE WATER/SALINE, 10 ML: HCPCS | Performed by: INTERNAL MEDICINE

## 2018-01-17 PROCEDURE — 25000003 PHARM REV CODE 250: Performed by: INTERNAL MEDICINE

## 2018-01-17 RX ORDER — SODIUM CHLORIDE 0.9 % (FLUSH) 0.9 %
10 SYRINGE (ML) INJECTION
Status: DISCONTINUED | OUTPATIENT
Start: 2018-01-17 | End: 2018-01-17 | Stop reason: HOSPADM

## 2018-01-17 RX ORDER — HEPARIN 100 UNIT/ML
500 SYRINGE INTRAVENOUS
Status: COMPLETED | OUTPATIENT
Start: 2018-01-17 | End: 2018-01-17

## 2018-01-17 RX ADMIN — HEPARIN 500 UNITS: 100 SYRINGE at 08:01

## 2018-01-17 RX ADMIN — SODIUM CHLORIDE, PRESERVATIVE FREE 10 ML: 5 INJECTION INTRAVENOUS at 08:01

## 2018-01-17 NOTE — PLAN OF CARE
Problem: Patient Care Overview  Goal: Plan of Care Review  Outcome: Ongoing (interventions implemented as appropriate)  Pt has port flushed with saline and heparin. tolerated well. Pt reports no problems today. Discharge reviewed and pt verbalized understanding. Pt walked in and out of unit alone without difficulty.

## 2018-02-21 ENCOUNTER — INFUSION (OUTPATIENT)
Dept: INFUSION THERAPY | Facility: HOSPITAL | Age: 73
End: 2018-02-21
Attending: INTERNAL MEDICINE
Payer: MEDICARE

## 2018-02-21 DIAGNOSIS — Z85.72 HISTORY OF NON-HODGKIN'S LYMPHOMA: Primary | ICD-10-CM

## 2018-02-21 PROCEDURE — 25000003 PHARM REV CODE 250: Performed by: INTERNAL MEDICINE

## 2018-02-21 PROCEDURE — 96523 IRRIG DRUG DELIVERY DEVICE: CPT

## 2018-02-21 PROCEDURE — 63600175 PHARM REV CODE 636 W HCPCS: Performed by: INTERNAL MEDICINE

## 2018-02-21 PROCEDURE — A4216 STERILE WATER/SALINE, 10 ML: HCPCS | Performed by: INTERNAL MEDICINE

## 2018-02-21 RX ORDER — SODIUM CHLORIDE 0.9 % (FLUSH) 0.9 %
10 SYRINGE (ML) INJECTION
Status: DISCONTINUED | OUTPATIENT
Start: 2018-02-21 | End: 2018-02-21 | Stop reason: HOSPADM

## 2018-02-21 RX ORDER — HEPARIN 100 UNIT/ML
500 SYRINGE INTRAVENOUS
Status: DISCONTINUED | OUTPATIENT
Start: 2018-02-21 | End: 2018-02-21 | Stop reason: HOSPADM

## 2018-02-21 RX ADMIN — SODIUM CHLORIDE, PRESERVATIVE FREE 10 ML: 5 INJECTION INTRAVENOUS at 08:02

## 2018-02-21 RX ADMIN — HEPARIN 500 UNITS: 100 SYRINGE at 08:02

## 2018-02-21 NOTE — PLAN OF CARE
Problem: Patient Care Overview  Goal: Plan of Care Review  Outcome: Ongoing (interventions implemented as appropriate)  Pt came in today to have port flushed. Flushed with saline and heparin. Pt tolerated well. Discharge reviewed and pt verbalized understanding.

## 2018-02-28 PROBLEM — C85.9A LYMPHOMA IN REMISSION: Status: ACTIVE | Noted: 2018-02-28

## 2018-02-28 PROBLEM — L98.9 SKIN LESIONS: Status: ACTIVE | Noted: 2018-02-28

## 2018-02-28 PROBLEM — C85.90 LYMPHOMA IN REMISSION: Status: ACTIVE | Noted: 2018-02-28

## 2018-02-28 PROBLEM — Z23 FLU VACCINE NEED: Status: ACTIVE | Noted: 2018-02-28

## 2018-03-06 ENCOUNTER — OFFICE VISIT (OUTPATIENT)
Dept: HEMATOLOGY/ONCOLOGY | Facility: CLINIC | Age: 73
End: 2018-03-06
Payer: MEDICARE

## 2018-03-06 VITALS
OXYGEN SATURATION: 98 % | HEIGHT: 70 IN | HEART RATE: 102 BPM | SYSTOLIC BLOOD PRESSURE: 142 MMHG | WEIGHT: 188.06 LBS | BODY MASS INDEX: 26.92 KG/M2 | TEMPERATURE: 99 F | DIASTOLIC BLOOD PRESSURE: 80 MMHG

## 2018-03-06 DIAGNOSIS — C85.90 NON-HODGKIN'S LYMPHOMA, UNSPECIFIED BODY REGION, UNSPECIFIED NON-HODGKIN LYMPHOMA TYPE: Primary | ICD-10-CM

## 2018-03-06 PROCEDURE — 99999 PR PBB SHADOW E&M-EST. PATIENT-LVL III: CPT | Mod: PBBFAC,,, | Performed by: INTERNAL MEDICINE

## 2018-03-06 PROCEDURE — 3077F SYST BP >= 140 MM HG: CPT | Mod: S$GLB,,, | Performed by: INTERNAL MEDICINE

## 2018-03-06 PROCEDURE — 3079F DIAST BP 80-89 MM HG: CPT | Mod: S$GLB,,, | Performed by: INTERNAL MEDICINE

## 2018-03-06 PROCEDURE — 99213 OFFICE O/P EST LOW 20 MIN: CPT | Mod: S$GLB,,, | Performed by: INTERNAL MEDICINE

## 2018-03-06 RX ORDER — PREDNISONE 10 MG/1
TABLET ORAL
COMMUNITY
Start: 2018-02-14 | End: 2019-02-28

## 2018-03-06 RX ORDER — FLUOCINONIDE 0.5 MG/G
CREAM TOPICAL
COMMUNITY
Start: 2018-02-14 | End: 2018-12-03 | Stop reason: SDUPTHER

## 2018-03-06 NOTE — PROGRESS NOTES
Subjective:       Patient ID: Long Najera is a 72 y.o. male.    Chief Complaint: No chief complaint on file.    HPI  Mr. Najera comes to the clinic for f/u for hx of  NHL.  Hodgkins lymphoma stage II 22 years ago treated with mantle radiation at [facility]   He was diagnosed with stage IV large cell lymphoma, though the subtype was unclear, and he completed 6 cycles of R-CHOP therapy to 9/2013      Pt previously followed by Dr. Harris.   Last f/u with Dr. Harris 2014.  Pt also followed by Dr. Alvarez    Pt followed by DERM for  blistering lesions wihich developed 2 mo ago    He visited Urgent Care and was diagnosed with bullous pemphigoid and treated with bactroban and course of steroid therapy   Pt reports lesions somewhat  improved with steroids but  Then got worse  Pt reports blisters rupture with clear fluid  He reports itching and lesions are mildly painful    Pt now treated by DERM with improvement in skin lesions    No fevers or night sweats  No dysphagia  Appetite and weight stable   He denies any recent infections   No SOB/CP/lightheadedness      Colonoscopy 2-3 yrs ago at [facility] pathologically benign      PET/CT 1/22/2016 Findings consistent with interval significant improvement in the neoplastic disease within the chest indicated by disappearance of a hypermetabolic right subpectoral lymph node and shrinkage/decreased metabolism of bilateral axillary lymph nodes.    PET/CT 1/11/2018   No evidence of residual or recurrent active neoplasm.    Lymphoma history:  His past medical history is notable for Hodgkins lymphoma stage II 21 years ago treated with mantle radiation at [facility] where he worked at the time.  He was then without symptoms until 2/2013 when he noted the onset of a right axillary node which progressively increased accompanied by the onset of fevers, weight loss and drenching night sweats.  He presented for evaluation in 4/2013 and underwent right axillary lymph node  core biopsy 3/7/13 revealing suspicious but nondiagnostic findings of scattered large atypical B lymphocytes that expressed CD20, pax-5 but not CD10, CD15 or CD30. Subsequent complete lymph node biopsy 4/15/13 was reviewed at [facility] finding showing a diffuse scatter involvement of large cells in a background of granulomatous and inflamed tissue. The large cells were CD20 negative but they did have uniform expression of other B markers including Oct-2 and CD45. The absence of CD30, MUM-1 and EBV (by in-situ hybridization) makes Hodgkins less likely. 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  Bone marrow 5/1/13 cellularity was 70% with focal involvement of large cell lymphoma (10-16% overall) which expressed CD20, pax-5, CD45 and Ki-67 with normal cytogenetics and FISH for abnormalities of bcl6, myc, bcl2 and t(11;14) were negative.      Hence, he was diagnosed with stage IV large cell lymphoma, though the subtype was unclear, and he completed 6 cycles of R-CHOP therapy  9/2013.  Since completing his therapy he has been feeling well and progressively stronger.  He has been without sweats, weight loss or fever since early in his therapy last year.  Some relative increase in dyspnea since his therapy last year but stable since then and he feels as if it's getting somewhat better recently (in line with his resolving anemia).      PET-CT 8/30/13 just after finishing therapy showed the largest right axillary node now measures 3.0 x 2.8 cm compared to 3.5 x 3.2 cm previously and has a max SUV of 2.9 compared to 4.0 previously. The hypermetabolic tumor volume of this node is 21.1 cc.     PET-CT 4/25/14 showed a similar appearing soft tissue focus within the right axilla stable in size from prior examination (in January) with slight decrease in hypermetabolic activity demonstrating a Max SUV of 2.1 on today's examination (previously 2.5). However, there is  "evidence of recurrent disease with interval increase in hypermetabolic activity involving a smaller right retropectoral lymph node demonstrating a max SUV of 2.8 (previously 2.3). Additionally, there is now contralateral disease within the left axilla evidenced by a hypermetabolic axillary lymph node demonstrating a max SUV of 3.0. No other areas of focal, abnormally increased FDG activity identified outside of these aforementioned regions. There is otherwise physiologic uptake within the periodontal regions, with voiding glandular structures of the head and neck, bilateral renal collecting systems, and bowel. There is a focus of hypermetabolic activity posterior to the proximal humerus representing muscle activity.    Review of Systems   Constitutional: Negative for activity change, appetite change, diaphoresis, fever and unexpected weight change.   HENT: Negative for congestion and mouth sores.    Eyes: Negative.    Respiratory: Negative for cough and shortness of breath.    Cardiovascular: Negative for chest pain and leg swelling.   Gastrointestinal: Negative for abdominal pain, blood in stool, constipation, diarrhea and nausea.   Genitourinary: Negative for dysuria and hematuria.   Musculoskeletal: Negative for arthralgias and back pain.   Skin: Negative for rash.   Neurological: Negative for weakness and light-headedness.       Objective:     ECOG 1  Vitals:    03/06/18 1433 03/06/18 1435   BP: (!) 160/84 (!) 142/80   BP Location: Right arm Right arm   Patient Position: Sitting Sitting   BP Method: Medium (Manual) Medium (Manual)   Pulse: 102    Temp: 98.9 °F (37.2 °C)    TempSrc: Oral    SpO2: 98%    Weight: 85.3 kg (188 lb 0.8 oz)    Height: 5' 10" (1.778 m)        Physical Exam   Constitutional: He is oriented to person, place, and time. He appears well-developed and well-nourished.   HENT:   Head: Normocephalic.   Mouth/Throat: Oropharynx is clear and moist. No oropharyngeal exudate.   Eyes: No scleral " icterus.   Neck: Normal range of motion. Neck supple. No thyromegaly present.   Cardiovascular: Normal rate, regular rhythm and normal heart sounds.    No murmur heard.  Pulmonary/Chest: Effort normal and breath sounds normal. He has no wheezes. He has no rales.   Abdominal: Soft. Bowel sounds are normal. He exhibits no distension. There is no hepatosplenomegaly. There is no tenderness.   Musculoskeletal: Normal range of motion. He exhibits no edema.   Lymphadenopathy:     He has no cervical adenopathy.     He has no axillary adenopathy.        Right: No supraclavicular adenopathy present.        Left: No supraclavicular adenopathy present.   Neurological: He is alert and oriented to person, place, and time. No cranial nerve deficit.   Skin: No erythema.   Psychiatric: He has a normal mood and affect.       Lab Results   Component Value Date    WBC 5.96 01/11/2018    HGB 14.9 01/11/2018    HCT 44.5 01/11/2018    MCV 84 01/11/2018     01/11/2018           PET/CT 1/22/2016 Findings consistent with interval significant improvement in the neoplastic disease within the chest indicated by disappearance of a hypermetabolic right subpectoral lymph node and shrinkage/decreased metabolism of bilateral axillary lymph nodes.          Results for LOUISE LONDON (MRN [id number]) as of 10/27/2016 08:29   Ref. Range 4/22/2016 08:15 7/22/2016 08:50 10/24/2016 07:49   LD Latest Ref Range: 110 - 260 U/L 200 196 204     Results for LOUISE LONDON (MRN [id number]) as of 1/27/2017 09:17   Ref. Range 1/24/2017 08:46   TSH Latest Ref Range: 0.400 - 4.000 uIU/mL 3.278       Results for LOUISE LONDON (MRN [id number]) as of 9/7/2017 08:20   Ref. Range 9/4/2017 07:34   LD Latest Ref Range: 110 - 260 U/L 217       Results for LOUISE LONDON (MRN [id number]) as of 12/13/2017 08:32   Ref. Range 9/4/2017 07:34 11/27/2017 16:49 12/12/2017 07:49   LD Latest Ref Range: 110 - 260 U/L 217  216     PET/CT 1/11/2018   No evidence of residual  or recurrent active neoplasm. The right prepectoral mass demonstrates no activity whatsoever.    Assessment:       1. Non-Hodgkin's lymphoma, unspecified body region, unspecified non-Hodgkin lymphoma type        Plan:    1-2 Pt  clinically stable    Hx of Hodgkin's lymphoma stage II >20 years ago treated with mantle radiation at [facility]   He has history of stage IV large cell lymphoma, subtype was unclear with some aggressive features and he completed 6 cycles of R-CHOP therapy  9/2013.  PETCT 2014 -axillary mass significantly improved: no has some residual disease in the area but more concerning is the small node on the other side SUV values are still very very low to be suggestive (3.0)    Pt seen by Dr. Alvarez and determined He has DLBCL  with some unusual features and aggressive features Axillary mass significantly improved: no has some residual disease in the area but more concerning is the small node on the other sideSUV values are still very very low to be suggestive (3.0) max After his 6 cycles of therapy his scans were never completely negative and he has had low level activity, mostly at his right axilla, but recently at the left axilla and in the retroperitoneal area.      PET findings 1/2016   Findings consistent with interval significant improvement in the neoplastic disease within the chest indicated by disappearance of a hypermetabolic right subpectoral lymph node and shrinkage/decreased metabolism of bilateral axillary lymph nodes.     It is determined,he will be followed without any more scans unless his symptoms, blood counts or lymph node exam dictate reevaluation.   If/when he has a clear relapse we will need a new biopsy to define his pathology      CBC reveals nl blood counts  LDH  Remains nl    If he develops sig  leukopenia or sig anemia, plan marrow  He Remains Asymptomatic    PET/CT 1/11/2018   No evidence of residual or recurrent active neoplasm.    Follow-up with DERM    CBC,  CMP, LDH , B-2.microglobulin prior to f/u  3mos  Pt knows to f/u sooner if problems should arise in the interim            Cc:Kimberley Ochoa M.D.

## 2018-03-28 ENCOUNTER — INFUSION (OUTPATIENT)
Dept: INFUSION THERAPY | Facility: HOSPITAL | Age: 73
End: 2018-03-28
Attending: INTERNAL MEDICINE
Payer: MEDICARE

## 2018-03-28 VITALS — RESPIRATION RATE: 16 BRPM | SYSTOLIC BLOOD PRESSURE: 113 MMHG | DIASTOLIC BLOOD PRESSURE: 66 MMHG | HEART RATE: 74 BPM

## 2018-03-28 DIAGNOSIS — Z85.72 HISTORY OF NON-HODGKIN'S LYMPHOMA: Primary | ICD-10-CM

## 2018-03-28 PROCEDURE — A4216 STERILE WATER/SALINE, 10 ML: HCPCS | Performed by: INTERNAL MEDICINE

## 2018-03-28 PROCEDURE — 96523 IRRIG DRUG DELIVERY DEVICE: CPT

## 2018-03-28 PROCEDURE — 25000003 PHARM REV CODE 250: Performed by: INTERNAL MEDICINE

## 2018-03-28 PROCEDURE — 63600175 PHARM REV CODE 636 W HCPCS: Performed by: INTERNAL MEDICINE

## 2018-03-28 RX ORDER — HEPARIN 100 UNIT/ML
500 SYRINGE INTRAVENOUS
Status: DISCONTINUED | OUTPATIENT
Start: 2018-03-28 | End: 2018-03-28 | Stop reason: HOSPADM

## 2018-03-28 RX ORDER — SODIUM CHLORIDE 0.9 % (FLUSH) 0.9 %
10 SYRINGE (ML) INJECTION
Status: DISCONTINUED | OUTPATIENT
Start: 2018-03-28 | End: 2018-03-28 | Stop reason: HOSPADM

## 2018-03-28 RX ADMIN — SODIUM CHLORIDE, PRESERVATIVE FREE 10 ML: 5 INJECTION INTRAVENOUS at 08:03

## 2018-03-28 RX ADMIN — HEPARIN 500 UNITS: 100 SYRINGE at 08:03

## 2018-04-25 ENCOUNTER — INFUSION (OUTPATIENT)
Dept: INFUSION THERAPY | Facility: HOSPITAL | Age: 73
End: 2018-04-25
Attending: INTERNAL MEDICINE
Payer: MEDICARE

## 2018-04-25 DIAGNOSIS — Z85.72 HISTORY OF NON-HODGKIN'S LYMPHOMA: Primary | ICD-10-CM

## 2018-04-25 PROCEDURE — 63600175 PHARM REV CODE 636 W HCPCS: Performed by: INTERNAL MEDICINE

## 2018-04-25 PROCEDURE — A4216 STERILE WATER/SALINE, 10 ML: HCPCS | Performed by: INTERNAL MEDICINE

## 2018-04-25 PROCEDURE — 25000003 PHARM REV CODE 250: Performed by: INTERNAL MEDICINE

## 2018-04-25 PROCEDURE — 96523 IRRIG DRUG DELIVERY DEVICE: CPT

## 2018-04-25 RX ORDER — HEPARIN 100 UNIT/ML
500 SYRINGE INTRAVENOUS
Status: DISCONTINUED | OUTPATIENT
Start: 2018-04-25 | End: 2018-04-25 | Stop reason: HOSPADM

## 2018-04-25 RX ORDER — SODIUM CHLORIDE 0.9 % (FLUSH) 0.9 %
10 SYRINGE (ML) INJECTION
Status: DISCONTINUED | OUTPATIENT
Start: 2018-04-25 | End: 2018-04-25 | Stop reason: HOSPADM

## 2018-04-25 RX ADMIN — HEPARIN 500 UNITS: 100 SYRINGE at 08:04

## 2018-04-25 RX ADMIN — SODIUM CHLORIDE, PRESERVATIVE FREE 10 ML: 5 INJECTION INTRAVENOUS at 08:04

## 2018-04-25 NOTE — PLAN OF CARE
Problem: Patient Care Overview  Goal: Plan of Care Review  Outcome: Ongoing (interventions implemented as appropriate)  Tolerated port flush.

## 2018-05-30 ENCOUNTER — INFUSION (OUTPATIENT)
Dept: INFUSION THERAPY | Facility: HOSPITAL | Age: 73
End: 2018-05-30
Attending: INTERNAL MEDICINE
Payer: MEDICARE

## 2018-05-30 VITALS
HEART RATE: 90 BPM | DIASTOLIC BLOOD PRESSURE: 74 MMHG | SYSTOLIC BLOOD PRESSURE: 137 MMHG | RESPIRATION RATE: 16 BRPM | OXYGEN SATURATION: 97 % | TEMPERATURE: 98 F

## 2018-05-30 DIAGNOSIS — Z85.72 HISTORY OF NON-HODGKIN'S LYMPHOMA: Primary | ICD-10-CM

## 2018-05-30 PROCEDURE — 96523 IRRIG DRUG DELIVERY DEVICE: CPT

## 2018-05-30 PROCEDURE — 25000003 PHARM REV CODE 250: Performed by: INTERNAL MEDICINE

## 2018-05-30 PROCEDURE — A4216 STERILE WATER/SALINE, 10 ML: HCPCS | Performed by: INTERNAL MEDICINE

## 2018-05-30 PROCEDURE — 63600175 PHARM REV CODE 636 W HCPCS: Performed by: INTERNAL MEDICINE

## 2018-05-30 RX ORDER — SODIUM CHLORIDE 0.9 % (FLUSH) 0.9 %
10 SYRINGE (ML) INJECTION
Status: DISCONTINUED | OUTPATIENT
Start: 2018-05-30 | End: 2018-05-30 | Stop reason: HOSPADM

## 2018-05-30 RX ORDER — HEPARIN 100 UNIT/ML
500 SYRINGE INTRAVENOUS
Status: DISCONTINUED | OUTPATIENT
Start: 2018-05-30 | End: 2018-05-30 | Stop reason: HOSPADM

## 2018-05-30 RX ADMIN — SODIUM CHLORIDE, PRESERVATIVE FREE 10 ML: 5 INJECTION INTRAVENOUS at 08:05

## 2018-05-30 RX ADMIN — HEPARIN 500 UNITS: 100 SYRINGE at 08:05

## 2018-06-27 ENCOUNTER — INFUSION (OUTPATIENT)
Dept: INFUSION THERAPY | Facility: HOSPITAL | Age: 73
End: 2018-06-27
Attending: INTERNAL MEDICINE
Payer: MEDICARE

## 2018-06-27 VITALS
TEMPERATURE: 98 F | RESPIRATION RATE: 16 BRPM | DIASTOLIC BLOOD PRESSURE: 74 MMHG | SYSTOLIC BLOOD PRESSURE: 125 MMHG | HEART RATE: 103 BPM | OXYGEN SATURATION: 95 %

## 2018-06-27 DIAGNOSIS — Z85.72 HISTORY OF NON-HODGKIN'S LYMPHOMA: Primary | ICD-10-CM

## 2018-06-27 PROCEDURE — A4216 STERILE WATER/SALINE, 10 ML: HCPCS | Performed by: INTERNAL MEDICINE

## 2018-06-27 PROCEDURE — 96523 IRRIG DRUG DELIVERY DEVICE: CPT

## 2018-06-27 PROCEDURE — 63600175 PHARM REV CODE 636 W HCPCS: Performed by: INTERNAL MEDICINE

## 2018-06-27 PROCEDURE — 25000003 PHARM REV CODE 250: Performed by: INTERNAL MEDICINE

## 2018-06-27 RX ORDER — HEPARIN 100 UNIT/ML
500 SYRINGE INTRAVENOUS
Status: DISCONTINUED | OUTPATIENT
Start: 2018-06-27 | End: 2018-06-27 | Stop reason: HOSPADM

## 2018-06-27 RX ORDER — SODIUM CHLORIDE 0.9 % (FLUSH) 0.9 %
10 SYRINGE (ML) INJECTION
Status: DISCONTINUED | OUTPATIENT
Start: 2018-06-27 | End: 2018-06-27 | Stop reason: HOSPADM

## 2018-06-27 RX ADMIN — HEPARIN SODIUM (PORCINE) LOCK FLUSH IV SOLN 100 UNIT/ML 500 UNITS: 100 SOLUTION at 08:06

## 2018-06-27 RX ADMIN — SODIUM CHLORIDE, PRESERVATIVE FREE 10 ML: 5 INJECTION INTRAVENOUS at 08:06

## 2018-06-27 NOTE — PLAN OF CARE
Problem: Patient Care Overview  Goal: Plan of Care Review  Outcome: Ongoing (interventions implemented as appropriate)  Tolerated port flush. No complaints voiced.

## 2018-07-03 ENCOUNTER — LAB VISIT (OUTPATIENT)
Dept: LAB | Facility: HOSPITAL | Age: 73
End: 2018-07-03
Attending: INTERNAL MEDICINE
Payer: MEDICARE

## 2018-07-03 DIAGNOSIS — C85.90 NON-HODGKIN'S LYMPHOMA, UNSPECIFIED BODY REGION, UNSPECIFIED NON-HODGKIN LYMPHOMA TYPE: ICD-10-CM

## 2018-07-03 LAB
ALBUMIN SERPL BCP-MCNC: 3.5 G/DL
ALP SERPL-CCNC: 68 U/L
ALT SERPL W/O P-5'-P-CCNC: 21 U/L
ANION GAP SERPL CALC-SCNC: 7 MMOL/L
AST SERPL-CCNC: 23 U/L
BASOPHILS # BLD AUTO: 0.02 K/UL
BASOPHILS NFR BLD: 0.4 %
BILIRUB SERPL-MCNC: 0.7 MG/DL
BUN SERPL-MCNC: 20 MG/DL
CALCIUM SERPL-MCNC: 9.6 MG/DL
CHLORIDE SERPL-SCNC: 105 MMOL/L
CO2 SERPL-SCNC: 32 MMOL/L
CREAT SERPL-MCNC: 1.2 MG/DL
DIFFERENTIAL METHOD: ABNORMAL
EOSINOPHIL # BLD AUTO: 0.1 K/UL
EOSINOPHIL NFR BLD: 2.4 %
ERYTHROCYTE [DISTWIDTH] IN BLOOD BY AUTOMATED COUNT: 14.6 %
EST. GFR  (AFRICAN AMERICAN): >60 ML/MIN/1.73 M^2
EST. GFR  (NON AFRICAN AMERICAN): >60 ML/MIN/1.73 M^2
GLUCOSE SERPL-MCNC: 100 MG/DL
HCT VFR BLD AUTO: 40.5 %
HGB BLD-MCNC: 13.5 G/DL
LDH SERPL L TO P-CCNC: 201 U/L
LYMPHOCYTES # BLD AUTO: 2.1 K/UL
LYMPHOCYTES NFR BLD: 41.1 %
MCH RBC QN AUTO: 28.2 PG
MCHC RBC AUTO-ENTMCNC: 33.3 G/DL
MCV RBC AUTO: 85 FL
MONOCYTES # BLD AUTO: 0.6 K/UL
MONOCYTES NFR BLD: 12 %
NEUTROPHILS # BLD AUTO: 2.2 K/UL
NEUTROPHILS NFR BLD: 43.7 %
PLATELET # BLD AUTO: 271 K/UL
PMV BLD AUTO: 9.2 FL
POTASSIUM SERPL-SCNC: 4.1 MMOL/L
PROT SERPL-MCNC: 6.4 G/DL
RBC # BLD AUTO: 4.78 M/UL
SODIUM SERPL-SCNC: 144 MMOL/L
WBC # BLD AUTO: 4.99 K/UL

## 2018-07-03 PROCEDURE — 80053 COMPREHEN METABOLIC PANEL: CPT

## 2018-07-03 PROCEDURE — 83615 LACTATE (LD) (LDH) ENZYME: CPT

## 2018-07-03 PROCEDURE — 36415 COLL VENOUS BLD VENIPUNCTURE: CPT

## 2018-07-03 PROCEDURE — 85025 COMPLETE CBC W/AUTO DIFF WBC: CPT

## 2018-07-10 ENCOUNTER — OFFICE VISIT (OUTPATIENT)
Dept: HEMATOLOGY/ONCOLOGY | Facility: CLINIC | Age: 73
End: 2018-07-10
Payer: MEDICARE

## 2018-07-10 VITALS
OXYGEN SATURATION: 97 % | DIASTOLIC BLOOD PRESSURE: 64 MMHG | WEIGHT: 193.44 LBS | BODY MASS INDEX: 27.69 KG/M2 | SYSTOLIC BLOOD PRESSURE: 128 MMHG | HEART RATE: 86 BPM | HEIGHT: 70 IN | TEMPERATURE: 99 F

## 2018-07-10 DIAGNOSIS — C85.90 NON-HODGKIN'S LYMPHOMA IN ADULT: Primary | ICD-10-CM

## 2018-07-10 DIAGNOSIS — Z85.71 HISTORY OF HODGKIN'S LYMPHOMA: ICD-10-CM

## 2018-07-10 PROCEDURE — 99213 OFFICE O/P EST LOW 20 MIN: CPT | Mod: S$GLB,,, | Performed by: INTERNAL MEDICINE

## 2018-07-10 PROCEDURE — 3074F SYST BP LT 130 MM HG: CPT | Mod: CPTII,S$GLB,, | Performed by: INTERNAL MEDICINE

## 2018-07-10 PROCEDURE — 99999 PR PBB SHADOW E&M-EST. PATIENT-LVL IV: CPT | Mod: PBBFAC,,, | Performed by: INTERNAL MEDICINE

## 2018-07-10 PROCEDURE — 3078F DIAST BP <80 MM HG: CPT | Mod: CPTII,S$GLB,, | Performed by: INTERNAL MEDICINE

## 2018-07-10 NOTE — PROGRESS NOTES
Subjective:       Patient ID: Long Najera is a 72 y.o. male.    Chief Complaint: Follow-up    HPI  Mr. Najera comes to the clinic for f/u for hx of  NHL.  Hodgkins lymphoma stage II 22 years ago treated with mantle radiation at [facility]   He was diagnosed with stage IV large cell lymphoma, though the subtype was unclear, and he completed 6 cycles of R-CHOP therapy to 9/2013      Pt previously followed by Dr. Harris.   Last f/u with Dr. Harris 2014.  Pt also followed by Dr. Alvarez    Pt followed by DERM for  blistering lesions wihich now resolved    He visited Urgent Care and was diagnosed with bullous pemphigoid and treated with bactroban and course of steroid therapy       He is doing well  Appetite and weight stable   No fevers or night sweats   He denies any recent/recurrent  infections   No SOB/CP/lightheadedness  No cough       Colonoscopy 2-3 yrs ago at [facility] pathologically benign      PET/CT 1/22/2016 Findings consistent with interval significant improvement in the neoplastic disease within the chest indicated by disappearance of a hypermetabolic right subpectoral lymph node and shrinkage/decreased metabolism of bilateral axillary lymph nodes.    PET/CT 1/11/2018   No evidence of residual or recurrent active neoplasm.    Lymphoma history:  His past medical history is notable for Hodgkins lymphoma stage II 21 years ago treated with mantle radiation at [facility] where he worked at the time.  He was then without symptoms until 2/2013 when he noted the onset of a right axillary node which progressively increased accompanied by the onset of fevers, weight loss and drenching night sweats.  He presented for evaluation in 4/2013 and underwent right axillary lymph node core biopsy 3/7/13 revealing suspicious but nondiagnostic findings of scattered large atypical B lymphocytes that expressed CD20, pax-5 but not CD10, CD15 or CD30. Subsequent complete lymph node biopsy 4/15/13 was reviewed at  [facility] finding showing a diffuse scatter involvement of large cells in a background of granulomatous and inflamed tissue. The large cells were CD20 negative but they did have uniform expression of other B markers including Oct-2 and CD45. The absence of CD30, MUM-1 and EBV (by in-situ hybridization) makes Hodgkins less likely. 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  Bone marrow 5/1/13 cellularity was 70% with focal involvement of large cell lymphoma (10-16% overall) which expressed CD20, pax-5, CD45 and Ki-67 with normal cytogenetics and FISH for abnormalities of bcl6, myc, bcl2 and t(11;14) were negative.      Hence, he was diagnosed with stage IV large cell lymphoma, though the subtype was unclear, and he completed 6 cycles of R-CHOP therapy  9/2013.  Since completing his therapy he has been feeling well and progressively stronger.  He has been without sweats, weight loss or fever since early in his therapy last year.  Some relative increase in dyspnea since his therapy last year but stable since then and he feels as if it's getting somewhat better recently (in line with his resolving anemia).      PET-CT 8/30/13 just after finishing therapy showed the largest right axillary node now measures 3.0 x 2.8 cm compared to 3.5 x 3.2 cm previously and has a max SUV of 2.9 compared to 4.0 previously. The hypermetabolic tumor volume of this node is 21.1 cc.     PET-CT 4/25/14 showed a similar appearing soft tissue focus within the right axilla stable in size from prior examination (in January) with slight decrease in hypermetabolic activity demonstrating a Max SUV of 2.1 on today's examination (previously 2.5). However, there is evidence of recurrent disease with interval increase in hypermetabolic activity involving a smaller right retropectoral lymph node demonstrating a max SUV of 2.8 (previously 2.3). Additionally, there is now contralateral disease  "within the left axilla evidenced by a hypermetabolic axillary lymph node demonstrating a max SUV of 3.0. No other areas of focal, abnormally increased FDG activity identified outside of these aforementioned regions. There is otherwise physiologic uptake within the periodontal regions, with voiding glandular structures of the head and neck, bilateral renal collecting systems, and bowel. There is a focus of hypermetabolic activity posterior to the proximal humerus representing muscle activity.    Review of Systems   Constitutional: Negative for activity change, appetite change, diaphoresis, fever and unexpected weight change.   HENT: Negative for congestion and mouth sores.    Eyes: Negative.    Respiratory: Negative for cough and shortness of breath.    Cardiovascular: Negative for chest pain and leg swelling.   Gastrointestinal: Negative for abdominal pain, blood in stool, constipation, diarrhea and nausea.   Genitourinary: Negative for dysuria and hematuria.   Musculoskeletal: Negative for arthralgias and back pain.   Skin: Negative for rash.   Neurological: Negative for weakness and light-headedness.       Objective:     ECOG 1  Vitals:    07/10/18 0952 07/10/18 0953   BP: (!) 145/81 128/64   BP Location: Right arm Right arm   Patient Position: Sitting Sitting   BP Method: Medium (Automatic) Medium (Manual)   Pulse: 86    Temp: 98.9 °F (37.2 °C)    TempSrc: Oral    SpO2: 97%    Weight: 87.8 kg (193 lb 7.3 oz)    Height: 5' 10" (1.778 m)        Physical Exam   Constitutional: He is oriented to person, place, and time. He appears well-developed and well-nourished.   HENT:   Head: Normocephalic.   Mouth/Throat: Oropharynx is clear and moist. No oropharyngeal exudate.   Eyes: No scleral icterus.   Neck: Normal range of motion. Neck supple. No thyromegaly present.   Cardiovascular: Normal rate, regular rhythm and normal heart sounds.    No murmur heard.  Pulmonary/Chest: Effort normal and breath sounds normal. He has " no wheezes. He has no rales.   Abdominal: Soft. Bowel sounds are normal. He exhibits no distension. There is no hepatosplenomegaly. There is no tenderness.   Musculoskeletal: Normal range of motion. He exhibits no edema.   Lymphadenopathy:     He has no cervical adenopathy.     He has no axillary adenopathy.        Right: No supraclavicular adenopathy present.        Left: No supraclavicular adenopathy present.   Neurological: He is alert and oriented to person, place, and time. No cranial nerve deficit.   Skin: No erythema.   Psychiatric: He has a normal mood and affect.       Lab Results   Component Value Date    WBC 4.99 07/03/2018    HGB 13.5 (L) 07/03/2018    HCT 40.5 07/03/2018    MCV 85 07/03/2018     07/03/2018           PET/CT 1/22/2016 Findings consistent with interval significant improvement in the neoplastic disease within the chest indicated by disappearance of a hypermetabolic right subpectoral lymph node and shrinkage/decreased metabolism of bilateral axillary lymph nodes.          Results for LOUISE LONDON (MRN [id number]) as of 10/27/2016 08:29   Ref. Range 4/22/2016 08:15 7/22/2016 08:50 10/24/2016 07:49   LD Latest Ref Range: 110 - 260 U/L 200 196 204     Results for LOUISE LONDON (MRN [id number]) as of 1/27/2017 09:17   Ref. Range 1/24/2017 08:46   TSH Latest Ref Range: 0.400 - 4.000 uIU/mL 3.278         Results for LOUISE LONDON (MRN [id number]) as of 7/10/2018 10:11   Ref. Range 1/11/2018 09:07 7/3/2018 08:13   LD Latest Ref Range: 110 - 260 U/L 225 201       PET/CT 1/11/2018   No evidence of residual or recurrent active neoplasm. The right prepectoral mass demonstrates no activity whatsoever.    Assessment:       1. Non-Hodgkin's lymphoma in adult    2. History of Hodgkin's lymphoma        Plan:    1-2 Pt  clinically stable    Hx of Hodgkin's lymphoma stage II >20 years ago treated with mantle radiation at [facility]   He has history of stage IV large cell lymphoma, subtype  was unclear with some aggressive features and he completed 6 cycles of R-CHOP therapy  9/2013.  PETCT 2014 -axillary mass significantly improved: no has some residual disease in the area but more concerning is the small node on the other side SUV values are still very very low to be suggestive (3.0)    Pt seen by Dr. Alvarez and determined He has DLBCL  with some unusual features and aggressive features Axillary mass significantly improved: no has some residual disease in the area but more concerning is the small node on the other sideSUV values are still very very low to be suggestive (3.0) max After his 6 cycles of therapy his scans were never completely negative and he has had low level activity, mostly at his right axilla, but recently at the left axilla and in the retroperitoneal area.      PET findings 1/2016   Findings consistent with interval significant improvement in the neoplastic disease within the chest indicated by disappearance of a hypermetabolic right subpectoral lymph node and shrinkage/decreased metabolism of bilateral axillary lymph nodes.     It is determined,he will be followed without any more scans unless his symptoms, blood counts or lymph node exam dictate reevaluation.   If/when he has a clear relapse we will need a new biopsy to define his pathology      CBC reveals nl blood counts  LDH  wnl  If he develops sig  leukopenia or sig anemia, plan marrow  Remains Asymptomatic  No sig findings on PE    PET/CT 1/11/2018   No evidence of residual or recurrent active neoplasm.    Cont observation     CBC, CMP, LDH , B-2.microglobulin prior to f/u  4mos    Pt knows to f/u sooner if problems should arise in the interim            Cc:Kimberley Ochoa M.D.

## 2018-07-25 ENCOUNTER — INFUSION (OUTPATIENT)
Dept: INFUSION THERAPY | Facility: HOSPITAL | Age: 73
End: 2018-07-25
Attending: INTERNAL MEDICINE
Payer: MEDICARE

## 2018-07-25 DIAGNOSIS — Z85.72 HISTORY OF NON-HODGKIN'S LYMPHOMA: Primary | ICD-10-CM

## 2018-07-25 PROCEDURE — 96523 IRRIG DRUG DELIVERY DEVICE: CPT

## 2018-07-25 PROCEDURE — 25000003 PHARM REV CODE 250: Performed by: INTERNAL MEDICINE

## 2018-07-25 PROCEDURE — A4216 STERILE WATER/SALINE, 10 ML: HCPCS | Performed by: INTERNAL MEDICINE

## 2018-07-25 PROCEDURE — 63600175 PHARM REV CODE 636 W HCPCS: Performed by: INTERNAL MEDICINE

## 2018-07-25 RX ORDER — SODIUM CHLORIDE 0.9 % (FLUSH) 0.9 %
10 SYRINGE (ML) INJECTION
Status: DISCONTINUED | OUTPATIENT
Start: 2018-07-25 | End: 2018-07-25 | Stop reason: HOSPADM

## 2018-07-25 RX ORDER — HEPARIN 100 UNIT/ML
500 SYRINGE INTRAVENOUS
Status: DISCONTINUED | OUTPATIENT
Start: 2018-07-25 | End: 2018-07-25 | Stop reason: HOSPADM

## 2018-07-25 RX ADMIN — SODIUM CHLORIDE, PRESERVATIVE FREE 10 ML: 5 INJECTION INTRAVENOUS at 08:07

## 2018-07-25 RX ADMIN — HEPARIN 500 UNITS: 100 SYRINGE at 08:07

## 2018-08-22 ENCOUNTER — INFUSION (OUTPATIENT)
Dept: INFUSION THERAPY | Facility: HOSPITAL | Age: 73
End: 2018-08-22
Attending: INTERNAL MEDICINE
Payer: MEDICARE

## 2018-08-22 VITALS — DIASTOLIC BLOOD PRESSURE: 80 MMHG | HEART RATE: 76 BPM | RESPIRATION RATE: 16 BRPM | SYSTOLIC BLOOD PRESSURE: 130 MMHG

## 2018-08-22 DIAGNOSIS — Z85.72 HISTORY OF NON-HODGKIN'S LYMPHOMA: Primary | ICD-10-CM

## 2018-08-22 PROCEDURE — A4216 STERILE WATER/SALINE, 10 ML: HCPCS | Performed by: INTERNAL MEDICINE

## 2018-08-22 PROCEDURE — 25000003 PHARM REV CODE 250: Performed by: INTERNAL MEDICINE

## 2018-08-22 PROCEDURE — 96523 IRRIG DRUG DELIVERY DEVICE: CPT

## 2018-08-22 PROCEDURE — 63600175 PHARM REV CODE 636 W HCPCS: Performed by: INTERNAL MEDICINE

## 2018-08-22 RX ORDER — SODIUM CHLORIDE 0.9 % (FLUSH) 0.9 %
10 SYRINGE (ML) INJECTION
Status: DISCONTINUED | OUTPATIENT
Start: 2018-08-22 | End: 2018-08-22 | Stop reason: HOSPADM

## 2018-08-22 RX ORDER — HEPARIN 100 UNIT/ML
500 SYRINGE INTRAVENOUS
Status: DISCONTINUED | OUTPATIENT
Start: 2018-08-22 | End: 2018-08-22 | Stop reason: HOSPADM

## 2018-08-22 RX ADMIN — Medication 10 ML: at 08:08

## 2018-08-22 RX ADMIN — HEPARIN 500 UNITS: 100 SYRINGE at 08:08

## 2018-08-22 NOTE — PLAN OF CARE
Problem: Patient Care Overview  Goal: Plan of Care Review  Outcome: Ongoing (interventions implemented as appropriate)  Pt arrived @ 8:00am for Port flush, ambulated  on to unit unassisted. LT CW port accessed and flushed, blood return noted, and heparin locked. Tolerated well. Discharged @ 8:10

## 2018-09-19 ENCOUNTER — INFUSION (OUTPATIENT)
Dept: INFUSION THERAPY | Facility: HOSPITAL | Age: 73
End: 2018-09-19
Attending: INTERNAL MEDICINE
Payer: MEDICARE

## 2018-09-19 DIAGNOSIS — Z85.72 HISTORY OF NON-HODGKIN'S LYMPHOMA: Primary | ICD-10-CM

## 2018-09-19 PROCEDURE — 63600175 PHARM REV CODE 636 W HCPCS: Performed by: INTERNAL MEDICINE

## 2018-09-19 PROCEDURE — 25000003 PHARM REV CODE 250: Performed by: INTERNAL MEDICINE

## 2018-09-19 PROCEDURE — A4216 STERILE WATER/SALINE, 10 ML: HCPCS | Performed by: INTERNAL MEDICINE

## 2018-09-19 PROCEDURE — 96523 IRRIG DRUG DELIVERY DEVICE: CPT

## 2018-09-19 RX ORDER — HEPARIN 100 UNIT/ML
500 SYRINGE INTRAVENOUS
Status: DISCONTINUED | OUTPATIENT
Start: 2018-09-19 | End: 2018-09-19 | Stop reason: HOSPADM

## 2018-09-19 RX ORDER — SODIUM CHLORIDE 0.9 % (FLUSH) 0.9 %
10 SYRINGE (ML) INJECTION
Status: DISCONTINUED | OUTPATIENT
Start: 2018-09-19 | End: 2018-09-19 | Stop reason: HOSPADM

## 2018-09-19 RX ADMIN — HEPARIN 500 UNITS: 100 SYRINGE at 08:09

## 2018-09-19 RX ADMIN — Medication 10 ML: at 08:09

## 2018-10-17 ENCOUNTER — OFFICE VISIT (OUTPATIENT)
Dept: URGENT CARE | Facility: CLINIC | Age: 73
End: 2018-10-17
Payer: MEDICARE

## 2018-10-17 ENCOUNTER — INFUSION (OUTPATIENT)
Dept: INFUSION THERAPY | Facility: HOSPITAL | Age: 73
End: 2018-10-17
Attending: INTERNAL MEDICINE
Payer: MEDICARE

## 2018-10-17 VITALS
OXYGEN SATURATION: 98 % | SYSTOLIC BLOOD PRESSURE: 135 MMHG | DIASTOLIC BLOOD PRESSURE: 75 MMHG | HEART RATE: 100 BPM | RESPIRATION RATE: 16 BRPM | TEMPERATURE: 98 F | HEIGHT: 71 IN | BODY MASS INDEX: 26.88 KG/M2 | WEIGHT: 192 LBS

## 2018-10-17 DIAGNOSIS — L13.9 BULLOUS DERMATITIS: Primary | ICD-10-CM

## 2018-10-17 DIAGNOSIS — Z85.72 HISTORY OF NON-HODGKIN'S LYMPHOMA: Primary | ICD-10-CM

## 2018-10-17 PROCEDURE — 63600175 PHARM REV CODE 636 W HCPCS: Performed by: INTERNAL MEDICINE

## 2018-10-17 PROCEDURE — 3078F DIAST BP <80 MM HG: CPT | Mod: CPTII,S$GLB,, | Performed by: NURSE PRACTITIONER

## 2018-10-17 PROCEDURE — 96523 IRRIG DRUG DELIVERY DEVICE: CPT

## 2018-10-17 PROCEDURE — 25000003 PHARM REV CODE 250: Performed by: INTERNAL MEDICINE

## 2018-10-17 PROCEDURE — 99214 OFFICE O/P EST MOD 30 MIN: CPT | Mod: S$GLB,,, | Performed by: NURSE PRACTITIONER

## 2018-10-17 PROCEDURE — A4216 STERILE WATER/SALINE, 10 ML: HCPCS | Performed by: INTERNAL MEDICINE

## 2018-10-17 PROCEDURE — 1101F PT FALLS ASSESS-DOCD LE1/YR: CPT | Mod: CPTII,S$GLB,, | Performed by: NURSE PRACTITIONER

## 2018-10-17 PROCEDURE — 3075F SYST BP GE 130 - 139MM HG: CPT | Mod: CPTII,S$GLB,, | Performed by: NURSE PRACTITIONER

## 2018-10-17 RX ORDER — SODIUM CHLORIDE 0.9 % (FLUSH) 0.9 %
10 SYRINGE (ML) INJECTION
Status: DISCONTINUED | OUTPATIENT
Start: 2018-10-17 | End: 2018-10-17 | Stop reason: HOSPADM

## 2018-10-17 RX ORDER — FLUOCINONIDE 0.5 MG/G
CREAM TOPICAL
Qty: 30 G | Refills: 0 | Status: SHIPPED | OUTPATIENT
Start: 2018-10-17 | End: 2018-11-26 | Stop reason: SDUPTHER

## 2018-10-17 RX ORDER — METHYLPREDNISOLONE 4 MG/1
4 TABLET ORAL
Qty: 1 PACKAGE | Refills: 0 | Status: SHIPPED | OUTPATIENT
Start: 2018-10-17 | End: 2018-10-23

## 2018-10-17 RX ORDER — HEPARIN 100 UNIT/ML
500 SYRINGE INTRAVENOUS
Status: DISCONTINUED | OUTPATIENT
Start: 2018-10-17 | End: 2018-10-17 | Stop reason: HOSPADM

## 2018-10-17 RX ADMIN — Medication 10 ML: at 08:10

## 2018-10-17 RX ADMIN — HEPARIN 500 UNITS: 100 SYRINGE at 08:10

## 2018-10-17 NOTE — PROGRESS NOTES
"Subjective:       Patient ID: Long Najera is a 73 y.o. male.    Vitals:  height is 5' 11" (1.803 m) and weight is 87.1 kg (192 lb). His temperature is 97.6 °F (36.4 °C). His blood pressure is 135/75 and his pulse is 100. His respiration is 16 and oxygen saturation is 98%.     Chief Complaint: Rash    Pt comes in today requesting refills for his oral steroids and topical cream for recurrent rash.    Reports dermatologist cannot see him for another 2 days      Rash   This is a recurrent problem. The current episode started yesterday. The problem is unchanged. The affected locations include the torso, right arm and left arm. The rash is characterized by blistering. Pertinent negatives include no fever, joint pain, shortness of breath or sore throat.     Review of Systems   Constitution: Negative for chills and fever.   HENT: Negative for sore throat.    Respiratory: Negative for shortness of breath.    Skin: Positive for rash. Negative for itching.   Musculoskeletal: Negative for joint pain.       Objective:      Physical Exam   Constitutional: He is oriented to person, place, and time. He appears well-developed and well-nourished.   HENT:   Head: Normocephalic and atraumatic. Head is without abrasion, without contusion and without laceration.   Right Ear: External ear normal.   Left Ear: External ear normal.   Nose: Nose normal.   Mouth/Throat: Oropharynx is clear and moist.   Eyes: Conjunctivae, EOM and lids are normal. Pupils are equal, round, and reactive to light.   Neck: Trachea normal, full passive range of motion without pain and phonation normal. Neck supple.   Cardiovascular: Normal rate, regular rhythm and normal heart sounds.   Pulmonary/Chest: Effort normal and breath sounds normal. No stridor. No respiratory distress.   Abdominal: Bowel sounds are normal.   Musculoskeletal: Normal range of motion.   Neurological: He is alert and oriented to person, place, and time.   Skin: Skin is warm, dry and " intact. Capillary refill takes less than 2 seconds. No abrasion, no bruising, no burn, no ecchymosis, no laceration, no lesion and no rash noted. No erythema.        Multiple bullous lesions on torso and upper ext. With clear fluid   Psychiatric: He has a normal mood and affect. His speech is normal and behavior is normal. Judgment and thought content normal. Cognition and memory are normal.   Nursing note and vitals reviewed.      Assessment:       1. Bullous dermatitis        Plan:         Bullous dermatitis  -     fluocinonide 0.05% (LIDEX) 0.05 % cream; Apply to affected area daily  Dispense: 30 g; Refill: 0  -     methylPREDNISolone (MEDROL DOSEPACK) 4 mg tablet; Take 1 tablet (4 mg total) by mouth as needed (Take as directed). Take as directed  Dispense: 1 Package; Refill: 0          Patient Instructions       You must understand that you've received an Urgent Care treatment only and that you may be released before all your medical problems are known or treated. You, the patient, will arrange for follow up care as instructed.  If your condition worsens we recommend that you receive another evaluation at the emergency room immediately or contact your primary medical clinics after hours call service to discuss your concerns.  Please return here or go to the Emergency Department for any concerns or worsening of condition.      Understanding Bullous Pemphigoid    Bullous pemphigoid is a long-term (chronic) skin disease. It mostly affects people ages 60 and older. It causes large blisters to form. These blisters may be on one part of the body or all over. They often appear on the arms, legs, groin, chest, and stomach. Sores may sometimes occur in the mouth, too.  How to say it  BULL-us [id number]   What causes bullous pemphigoid?  Bullous pemphigoid is an autoimmune disease. Thats when the immune system attacks healthy parts of the body. In this case, it attacks the proteins that join the skin cells to one  another. The disease may also be caused by some medicines, such as penicillin.  Symptoms of bullous pemphigoid  Bullous pemphigoid may first look like hives or eczema, because it can be red and itchy. Large blisters may then form. These blisters are often filled with a clear liquid. They may feel very itchy. The blisters may pop. But they usually dont leave any scars. They may go away and come back again.  Treatment for bullous pemphigoid  The blisters from this skin disease may go away and come back. So treatment is often needed for a few years. Treatment options include:  · Skin care. Using mild soap and anti-itch creams may help with symptoms.  · Steroids. Steroids are the main treatment option for this disease. They may be creams or ointments that you put on your skin. Or they may be in pill or liquid form (oral). They can ease itching and stop new blisters from forming. But oral steroids may have side effects.  · Other medicines. If you cant take a steroid, other medicines may work. Some of these treat the disease by slowing down the immune system.  Possible complications of bullous pemphigoid  ·  Skin infection  When to call your healthcare provider  Call your healthcare provider right away if you have any of these:  · Fever of 100.4°F (38°C) or higher, or as directed  · Pain that gets worse  · Symptoms that dont get better, or get worse  · New symptoms   Date Last Reviewed: 5/1/2016  © 0833-6225 [employer]. [address] [place] [address], [place] [address]. All rights reserved. This information is not intended as a substitute for professional medical care. Always follow your healthcare professional's instructions.

## 2018-10-17 NOTE — PATIENT INSTRUCTIONS
You must understand that you've received an Urgent Care treatment only and that you may be released before all your medical problems are known or treated. You, the patient, will arrange for follow up care as instructed.  If your condition worsens we recommend that you receive another evaluation at the emergency room immediately or contact your primary medical clinics after hours call service to discuss your concerns.  Please return here or go to the Emergency Department for any concerns or worsening of condition.      Understanding Bullous Pemphigoid    Bullous pemphigoid is a long-term (chronic) skin disease. It mostly affects people ages 60 and older. It causes large blisters to form. These blisters may be on one part of the body or all over. They often appear on the arms, legs, groin, chest, and stomach. Sores may sometimes occur in the mouth, too.  How to say it  BULL-us [id number]   What causes bullous pemphigoid?  Bullous pemphigoid is an autoimmune disease. Thats when the immune system attacks healthy parts of the body. In this case, it attacks the proteins that join the skin cells to one another. The disease may also be caused by some medicines, such as penicillin.  Symptoms of bullous pemphigoid  Bullous pemphigoid may first look like hives or eczema, because it can be red and itchy. Large blisters may then form. These blisters are often filled with a clear liquid. They may feel very itchy. The blisters may pop. But they usually dont leave any scars. They may go away and come back again.  Treatment for bullous pemphigoid  The blisters from this skin disease may go away and come back. So treatment is often needed for a few years. Treatment options include:  · Skin care. Using mild soap and anti-itch creams may help with symptoms.  · Steroids. Steroids are the main treatment option for this disease. They may be creams or ointments that you put on your skin. Or they may be in pill or liquid form (oral).  They can ease itching and stop new blisters from forming. But oral steroids may have side effects.  · Other medicines. If you cant take a steroid, other medicines may work. Some of these treat the disease by slowing down the immune system.  Possible complications of bullous pemphigoid  ·  Skin infection  When to call your healthcare provider  Call your healthcare provider right away if you have any of these:  · Fever of 100.4°F (38°C) or higher, or as directed  · Pain that gets worse  · Symptoms that dont get better, or get worse  · New symptoms   Date Last Reviewed: 5/1/2016  © 9783-0784 [employer]. [address] [place] [address], [place] [address]. All rights reserved. This information is not intended as a substitute for professional medical care. Always follow your healthcare professional's instructions.

## 2018-11-14 ENCOUNTER — INFUSION (OUTPATIENT)
Dept: INFUSION THERAPY | Facility: HOSPITAL | Age: 73
End: 2018-11-14
Attending: INTERNAL MEDICINE
Payer: MEDICARE

## 2018-11-14 DIAGNOSIS — Z85.72 HISTORY OF NON-HODGKIN'S LYMPHOMA: Primary | ICD-10-CM

## 2018-11-14 PROCEDURE — 25000003 PHARM REV CODE 250: Performed by: INTERNAL MEDICINE

## 2018-11-14 PROCEDURE — 96523 IRRIG DRUG DELIVERY DEVICE: CPT

## 2018-11-14 PROCEDURE — A4216 STERILE WATER/SALINE, 10 ML: HCPCS | Performed by: INTERNAL MEDICINE

## 2018-11-14 PROCEDURE — 63600175 PHARM REV CODE 636 W HCPCS: Performed by: INTERNAL MEDICINE

## 2018-11-14 RX ORDER — SODIUM CHLORIDE 0.9 % (FLUSH) 0.9 %
10 SYRINGE (ML) INJECTION
Status: DISCONTINUED | OUTPATIENT
Start: 2018-11-14 | End: 2018-11-14 | Stop reason: HOSPADM

## 2018-11-14 RX ORDER — HEPARIN 100 UNIT/ML
500 SYRINGE INTRAVENOUS
Status: DISCONTINUED | OUTPATIENT
Start: 2018-11-14 | End: 2018-11-14 | Stop reason: HOSPADM

## 2018-11-14 RX ADMIN — Medication 10 ML: at 08:11

## 2018-11-14 RX ADMIN — HEPARIN 500 UNITS: 100 SYRINGE at 08:11

## 2018-11-26 ENCOUNTER — OFFICE VISIT (OUTPATIENT)
Dept: HEMATOLOGY/ONCOLOGY | Facility: CLINIC | Age: 73
End: 2018-11-26
Payer: MEDICARE

## 2018-11-26 VITALS
BODY MASS INDEX: 27.27 KG/M2 | DIASTOLIC BLOOD PRESSURE: 77 MMHG | HEART RATE: 100 BPM | HEIGHT: 70 IN | SYSTOLIC BLOOD PRESSURE: 129 MMHG | TEMPERATURE: 98 F | OXYGEN SATURATION: 98 % | WEIGHT: 190.5 LBS

## 2018-11-26 DIAGNOSIS — Z85.71 HISTORY OF HODGKIN'S LYMPHOMA: ICD-10-CM

## 2018-11-26 DIAGNOSIS — R01.1 CARDIAC MURMUR: ICD-10-CM

## 2018-11-26 DIAGNOSIS — Z85.72 HISTORY OF NON-HODGKIN'S LYMPHOMA: Primary | ICD-10-CM

## 2018-11-26 PROCEDURE — 1101F PT FALLS ASSESS-DOCD LE1/YR: CPT | Mod: CPTII,S$GLB,, | Performed by: INTERNAL MEDICINE

## 2018-11-26 PROCEDURE — 3074F SYST BP LT 130 MM HG: CPT | Mod: CPTII,S$GLB,, | Performed by: INTERNAL MEDICINE

## 2018-11-26 PROCEDURE — 3078F DIAST BP <80 MM HG: CPT | Mod: CPTII,S$GLB,, | Performed by: INTERNAL MEDICINE

## 2018-11-26 PROCEDURE — 99999 PR PBB SHADOW E&M-EST. PATIENT-LVL III: CPT | Mod: PBBFAC,,, | Performed by: INTERNAL MEDICINE

## 2018-11-26 PROCEDURE — 99213 OFFICE O/P EST LOW 20 MIN: CPT | Mod: S$GLB,,, | Performed by: INTERNAL MEDICINE

## 2018-11-26 RX ORDER — MOMETASONE FUROATE 1 MG/G
CREAM TOPICAL
COMMUNITY
Start: 2018-10-26 | End: 2020-08-06

## 2018-11-26 NOTE — PROGRESS NOTES
Subjective:       Patient ID: Long Najera is a 73 y.o. male.    Chief Complaint: Follow-up    HPI  Mr. Najera comes to the clinic for f/u for hx of  NHL.  Hodgkins lymphoma stage II 22 years ago treated with mantle radiation at [facility]   He was diagnosed with stage IV large cell lymphoma, though the subtype was unclear, and he completed 6 cycles of R-CHOP therapy to 9/2013      Pt previously followed by Dr. Harris.   Last f/u with Dr. Harris 2014.  Pt also followed by Dr. Alvarez        Pt followed by DERM for  blistering lesions wihich now resolved  He had visited Urgent Care and was diagnosed with bullous pemphigoid and treated with bactroban and course of steroid therapy       He is doing well  He reports rash recurred  aboout 1 month ago   Rash developed neck, abdomen and LE   Pruritic  Pt followed by Derm   Pt prescribed medrol dose pack and steroid cream   Appetite and weight stable   No fevers or night sweats  No SOB/CP/lightheadedness  No cough   No recent infections      Colonoscopy 2-3 yrs ago at [facility] pathologically benign      PET/CT 1/22/2016 Findings consistent with interval significant improvement in the neoplastic disease within the chest indicated by disappearance of a hypermetabolic right subpectoral lymph node and shrinkage/decreased metabolism of bilateral axillary lymph nodes.    PET/CT 1/11/2018   No evidence of residual or recurrent active neoplasm.    Lymphoma history:  His past medical history is notable for Hodgkins lymphoma stage II 21 years ago treated with mantle radiation at [facility] where he worked at the time.  He was then without symptoms until 2/2013 when he noted the onset of a right axillary node which progressively increased accompanied by the onset of fevers, weight loss and drenching night sweats.  He presented for evaluation in 4/2013 and underwent right axillary lymph node core biopsy 3/7/13 revealing suspicious but nondiagnostic findings of  scattered large atypical B lymphocytes that expressed CD20, pax-5 but not CD10, CD15 or CD30. Subsequent complete lymph node biopsy 4/15/13 was reviewed at [facility] finding showing a diffuse scatter involvement of large cells in a background of granulomatous and inflamed tissue. The large cells were CD20 negative but they did have uniform expression of other B markers including Oct-2 and CD45. The absence of CD30, MUM-1 and EBV (by in-situ hybridization) makes Hodgkins less likely. 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  Bone marrow 5/1/13 cellularity was 70% with focal involvement of large cell lymphoma (10-16% overall) which expressed CD20, pax-5, CD45 and Ki-67 with normal cytogenetics and FISH for abnormalities of bcl6, myc, bcl2 and t(11;14) were negative.      Hence, he was diagnosed with stage IV large cell lymphoma, though the subtype was unclear, and he completed 6 cycles of R-CHOP therapy  9/2013.  Since completing his therapy he has been feeling well and progressively stronger.  He has been without sweats, weight loss or fever since early in his therapy last year.  Some relative increase in dyspnea since his therapy last year but stable since then and he feels as if it's getting somewhat better recently (in line with his resolving anemia).      PET-CT 8/30/13 just after finishing therapy showed the largest right axillary node now measures 3.0 x 2.8 cm compared to 3.5 x 3.2 cm previously and has a max SUV of 2.9 compared to 4.0 previously. The hypermetabolic tumor volume of this node is 21.1 cc.     PET-CT 4/25/14 showed a similar appearing soft tissue focus within the right axilla stable in size from prior examination (in January) with slight decrease in hypermetabolic activity demonstrating a Max SUV of 2.1 on today's examination (previously 2.5). However, there is evidence of recurrent disease with interval increase in hypermetabolic  "activity involving a smaller right retropectoral lymph node demonstrating a max SUV of 2.8 (previously 2.3). Additionally, there is now contralateral disease within the left axilla evidenced by a hypermetabolic axillary lymph node demonstrating a max SUV of 3.0. No other areas of focal, abnormally increased FDG activity identified outside of these aforementioned regions. There is otherwise physiologic uptake within the periodontal regions, with voiding glandular structures of the head and neck, bilateral renal collecting systems, and bowel. There is a focus of hypermetabolic activity posterior to the proximal humerus representing muscle activity.    Review of Systems   Constitutional: Negative for activity change, appetite change, diaphoresis, fever and unexpected weight change.   HENT: Negative for congestion and mouth sores.    Eyes: Negative.    Respiratory: Negative for cough and shortness of breath.    Cardiovascular: Negative for chest pain and leg swelling.   Gastrointestinal: Negative for abdominal pain, blood in stool, constipation, diarrhea and nausea.   Genitourinary: Negative for dysuria and hematuria.   Musculoskeletal: Negative for arthralgias and back pain.   Skin: Negative for rash.   Neurological: Negative for weakness and light-headedness.       Objective:     ECOG 1  Vitals:    11/26/18 1355   BP: 129/77   BP Location: Right arm   Patient Position: Sitting   BP Method: Medium (Automatic)   Pulse: 100   Temp: 98.1 °F (36.7 °C)   TempSrc: Oral   SpO2: 98%   Weight: 86.4 kg (190 lb 7.6 oz)   Height: 5' 9.5" (1.765 m)       Physical Exam   Constitutional: He is oriented to person, place, and time. He appears well-developed and well-nourished.   HENT:   Head: Normocephalic.   Mouth/Throat: Oropharynx is clear and moist. No oropharyngeal exudate.   Eyes: No scleral icterus.   Neck: Normal range of motion. Neck supple. No thyromegaly present.   Cardiovascular: Normal rate and regular rhythm.   Murmur " heard.  Pulmonary/Chest: Effort normal and breath sounds normal. He has no wheezes. He has no rales.   Abdominal: Soft. Bowel sounds are normal. He exhibits no distension. There is no hepatosplenomegaly. There is no tenderness.   Musculoskeletal: Normal range of motion. He exhibits no edema.   Lymphadenopathy:     He has no cervical adenopathy.     He has no axillary adenopathy.        Right: No supraclavicular adenopathy present.        Left: No supraclavicular adenopathy present.   Neurological: He is alert and oriented to person, place, and time. No cranial nerve deficit.   Skin: No erythema.   Psychiatric: He has a normal mood and affect.       Lab Results   Component Value Date    WBC 3.92 11/21/2018    HGB 13.0 (L) 11/21/2018    HCT 40.1 11/21/2018    MCV 87 11/21/2018     11/21/2018           PET/CT 1/22/2016 Findings consistent with interval significant improvement in the neoplastic disease within the chest indicated by disappearance of a hypermetabolic right subpectoral lymph node and shrinkage/decreased metabolism of bilateral axillary lymph nodes.      Results for LOUISE LONDON (MRN [id number]) as of 1/27/2017 09:17   Ref. Range 1/24/2017 08:46   TSH Latest Ref Range: 0.400 - 4.000 uIU/mL 3.278         PET/CT 1/11/2018   No evidence of residual or recurrent active neoplasm. The right prepectoral mass demonstrates no activity whatsoever.      Results for LOUISE LONDON (MRN [id number]) as of 11/26/2018 14:12   Ref. Range 1/11/2018 09:07 7/3/2018 08:13 11/21/2018 14:15   LD Latest Ref Range: 110 - 260 U/L 225 201 312 (H)     Assessment:       1. History of non-Hodgkin's lymphoma    2. History of Hodgkin's lymphoma    3. Cardiac murmur        Plan:    1-3 Pt  clinically stable    Hx of Hodgkin's lymphoma stage II >20 years ago treated with mantle radiation at [facility]   He has history of stage IV large cell lymphoma, subtype was unclear with some aggressive features and he completed 6  cycles of R-CHOP therapy  9/2013.  PETCT 2014 -axillary mass significantly improved: no has some residual disease in the area but more concerning is the small node on the other side SUV values are still very very low to be suggestive (3.0)    Pt seen by Dr. Alvarez and determined He has DLBCL  with some unusual features and aggressive features Axillary mass significantly improved: no has some residual disease in the area but more concerning is the small node on the other sideSUV values are still very very low to be suggestive (3.0) max After his 6 cycles of therapy his scans were never completely negative and he has had low level activity, mostly at his right axilla, but recently at the left axilla and in the retroperitoneal area.      PET findings 1/2016   Findings consistent with interval significant improvement in the neoplastic disease within the chest indicated by disappearance of a hypermetabolic right subpectoral lymph node and shrinkage/decreased metabolism of bilateral axillary lymph nodes.     PET/CT 1/11/2018  No evidence of residual or recurrent active neoplasm.  IT iis determined,he will be followed without any more scans unless his symptoms, blood counts or lymph node exam dictate reevaluation.   If/when he has a clear relapse we will need a new biopsy to define his pathology    CBC reveals borderline anemia, otherwise nl  If he develops sig  leukopenia or sig anemia, plan marrow  Remains Asymptomatic  No sig findings on PE  Cont observation     CBC, CMP, LDH , B-2.microglobulin prior to f/u  4mos    Pt knows to f/u sooner if problems should arise in the interim      Cc:Kimberley Ochoa M.D.

## 2018-11-28 ENCOUNTER — HOSPITAL ENCOUNTER (OUTPATIENT)
Dept: CARDIOLOGY | Facility: HOSPITAL | Age: 73
Discharge: HOME | End: 2018-11-28
Attending: INTERNAL MEDICINE
Payer: MEDICARE

## 2018-11-28 VITALS — BODY MASS INDEX: 26.88 KG/M2 | HEIGHT: 71 IN | WEIGHT: 192 LBS

## 2018-11-28 DIAGNOSIS — R01.1 CARDIAC MURMUR: ICD-10-CM

## 2018-11-28 LAB
AORTIC ROOT ANNULUS: 3.27 CM
AORTIC VALVE CUSP SEPERATION: 1.73 CM
ASCENDING AORTA: 3.09 CM
AV INDEX (PROSTH): 0.57
AV MEAN GRADIENT: 6.25 MMHG
AV PEAK GRADIENT: 11.97 MMHG
AV VALVE AREA: 1.7 CM2
BSA FOR ECHO PROCEDURE: 2.09 M2
CV ECHO LV RWT: 0.38 CM
DOP CALC AO PEAK VEL: 1.73 M/S
DOP CALC AO VTI: 33.97 CM
DOP CALC LVOT AREA: 2.98 CM2
DOP CALC LVOT DIAMETER: 1.95 CM
DOP CALC LVOT STROKE VOLUME: 57.91 CM3
DOP CALCLVOT PEAK VEL VTI: 19.4 CM
E WAVE DECELERATION TIME: 323.16 MSEC
E/A RATIO: 1.12
E/E' RATIO: 23.6
ECHO LV POSTERIOR WALL: 0.92 CM (ref 0.6–1.1)
FRACTIONAL SHORTENING: 33 % (ref 28–44)
INTERVENTRICULAR SEPTUM: 0.65 CM (ref 0.6–1.1)
IVRT: 0.13 MSEC
LA MAJOR: 6.03 CM
LA MINOR: 6.13 CM
LA WIDTH: 4.07 CM
LEFT ATRIUM SIZE: 3.7 CM
LEFT ATRIUM VOLUME INDEX: 37.2 ML/M2
LEFT ATRIUM VOLUME: 77.82 CM3
LEFT INTERNAL DIMENSION IN SYSTOLE: 3.19 CM (ref 2.1–4)
LEFT VENTRICLE DIASTOLIC VOLUME INDEX: 51.22 ML/M2
LEFT VENTRICLE DIASTOLIC VOLUME: 107.04 ML
LEFT VENTRICLE MASS INDEX: 58.9 G/M2
LEFT VENTRICLE SYSTOLIC VOLUME INDEX: 19.4 ML/M2
LEFT VENTRICLE SYSTOLIC VOLUME: 40.55 ML
LEFT VENTRICULAR INTERNAL DIMENSION IN DIASTOLE: 4.79 CM (ref 3.5–6)
LEFT VENTRICULAR MASS: 123.2 G
LV LATERAL E/E' RATIO: 19.67
LV SEPTAL E/E' RATIO: 29.5
MV PEAK A VEL: 1.58 M/S
MV PEAK E VEL: 1.77 M/S
PISA TR MAX VEL: 1.82 M/S
PV PEAK VELOCITY: 0.9 CM/S
RA MAJOR: 4.58 CM
RA PRESSURE: 3 MMHG
RA WIDTH: 4.08 CM
RIGHT VENTRICULAR END-DIASTOLIC DIMENSION: 3.41 CM
RV TISSUE DOPPLER FREE WALL SYSTOLIC VELOCITY 1 (APICAL 4 CHAMBER VIEW): 13.47 M/S
SINUS: 3.14 CM
STJ: 2.61 CM
TDI LATERAL: 0.09
TDI SEPTAL: 0.06
TDI: 0.08
TR MAX PG: 13.25 MMHG
TRICUSPID ANNULAR PLANE SYSTOLIC EXCURSION: 2.18 CM
TV REST PULMONARY ARTERY PRESSURE: 16.25 MMHG

## 2018-11-28 PROCEDURE — 93306 TTE W/DOPPLER COMPLETE: CPT | Mod: 26,,, | Performed by: INTERNAL MEDICINE

## 2018-11-28 PROCEDURE — 93306 TTE W/DOPPLER COMPLETE: CPT

## 2018-12-03 ENCOUNTER — OFFICE VISIT (OUTPATIENT)
Dept: URGENT CARE | Facility: CLINIC | Age: 73
End: 2018-12-03
Payer: MEDICARE

## 2018-12-03 VITALS
OXYGEN SATURATION: 98 % | WEIGHT: 190 LBS | SYSTOLIC BLOOD PRESSURE: 112 MMHG | TEMPERATURE: 97 F | HEIGHT: 71 IN | BODY MASS INDEX: 26.6 KG/M2 | RESPIRATION RATE: 18 BRPM | DIASTOLIC BLOOD PRESSURE: 71 MMHG | HEART RATE: 101 BPM

## 2018-12-03 DIAGNOSIS — L13.9 BULLOUS DERMATITIS: Primary | ICD-10-CM

## 2018-12-03 PROCEDURE — 1101F PT FALLS ASSESS-DOCD LE1/YR: CPT | Mod: CPTII,S$GLB,, | Performed by: NURSE PRACTITIONER

## 2018-12-03 PROCEDURE — 3074F SYST BP LT 130 MM HG: CPT | Mod: CPTII,S$GLB,, | Performed by: NURSE PRACTITIONER

## 2018-12-03 PROCEDURE — 99214 OFFICE O/P EST MOD 30 MIN: CPT | Mod: S$GLB,,, | Performed by: NURSE PRACTITIONER

## 2018-12-03 PROCEDURE — 3078F DIAST BP <80 MM HG: CPT | Mod: CPTII,S$GLB,, | Performed by: NURSE PRACTITIONER

## 2018-12-03 RX ORDER — FLUOCINONIDE 0.5 MG/G
CREAM TOPICAL 2 TIMES DAILY
Qty: 120 G | Refills: 0 | Status: SHIPPED | OUTPATIENT
Start: 2018-12-03 | End: 2019-02-28 | Stop reason: SDUPTHER

## 2018-12-03 RX ORDER — METHYLPREDNISOLONE 4 MG/1
TABLET ORAL
Qty: 21 TABLET | Refills: 0 | Status: SHIPPED | OUTPATIENT
Start: 2018-12-03 | End: 2019-02-28

## 2018-12-03 NOTE — PROGRESS NOTES
"Subjective:       Patient ID: Long Najera is a 73 y.o. male.    Vitals:  height is 5' 11" (1.803 m) and weight is 86.2 kg (190 lb). His temperature is 97.3 °F (36.3 °C). His blood pressure is 112/71 and his pulse is 101. His respiration is 18 and oxygen saturation is 98%.     Chief Complaint: Rash    Pt reports he has history of bullous dermatitis that flared up a week ago. Pt reports lesions to torso, legs, arms. Pt reports he has seen dermatologist twice and that he has been told a medrol pack and cream will help. Pt reports he was unable to get in with derm today.       Rash   This is a new problem. Episode onset: 1 week. The problem has been gradually worsening since onset. The rash is diffuse. The rash is characterized by blistering. It is unknown if there was an exposure to a precipitant. Pertinent negatives include no cough, fever or sore throat. Past treatments include antibiotic cream and anti-itch cream. The treatment provided mild relief.       Constitution: Negative for chills and fever.   HENT: Negative for facial swelling and sore throat.    Neck: Negative for painful lymph nodes.   Eyes: Negative for eye itching and eyelid swelling.   Respiratory: Negative for cough.    Musculoskeletal: Negative for joint pain and joint swelling.   Skin: Positive for rash and hives. Negative for color change, pale, wound, abrasion, laceration, lesion, skin thickening/induration, puncture wound, erythema, abscess and avulsion.   Allergic/Immunologic: Positive for hives. Negative for environmental allergies and immunocompromised state.   Hematologic/Lymphatic: Negative for swollen lymph nodes.       Objective:      Physical Exam   Constitutional: He is oriented to person, place, and time. He appears well-developed and well-nourished.   HENT:   Head: Normocephalic and atraumatic. Head is without abrasion, without contusion and without laceration.   Right Ear: External ear normal.   Left Ear: External ear normal. "   Nose: Nose normal.   Mouth/Throat: Oropharynx is clear and moist.   Eyes: Conjunctivae, EOM and lids are normal. Pupils are equal, round, and reactive to light.   Neck: Trachea normal, full passive range of motion without pain and phonation normal. Neck supple.   Cardiovascular: Normal rate, regular rhythm and normal heart sounds.   Pulmonary/Chest: Effort normal and breath sounds normal. No stridor. No respiratory distress.   Musculoskeletal: Normal range of motion.   Neurological: He is alert and oriented to person, place, and time.   Skin: Skin is warm, dry and intact. Capillary refill takes less than 2 seconds. Rash noted. No abrasion, no bruising, no burn, no ecchymosis, no laceration and no lesion noted. No erythema.        Diffuse, fluid filled vesicles   Psychiatric: He has a normal mood and affect. His speech is normal and behavior is normal. Judgment and thought content normal. Cognition and memory are normal.   Nursing note and vitals reviewed.      Assessment:       1. Bullous dermatitis        Plan:         Bullous dermatitis  -     fluocinonide 0.05% (LIDEX) 0.05 % cream; Apply topically 2 (two) times daily. for 7 days  Dispense: 120 g; Refill: 0  -     methylPREDNISolone (MEDROL DOSEPACK) 4 mg tablet; Take as directed on package  Dispense: 21 tablet; Refill: 0      Patient Instructions         FOLLOW UP WITH DERMATOLOGIST    APPLY CREAM TWICE A DAY    Understanding Bullous Pemphigoid    Bullous pemphigoid is a long-term (chronic) skin disease. It mostly affects people ages 60 and older. It causes large blisters to form. These blisters may be on one part of the body or all over. They often appear on the arms, legs, groin, chest, and stomach. Sores may sometimes occur in the mouth, too.  How to say it  BULL-us [id number]   What causes bullous pemphigoid?  Bullous pemphigoid is an autoimmune disease. Thats when the immune system attacks healthy parts of the body. In this case, it attacks the  proteins that join the skin cells to one another. The disease may also be caused by some medicines, such as penicillin.  Symptoms of bullous pemphigoid  Bullous pemphigoid may first look like hives or eczema, because it can be red and itchy. Large blisters may then form. These blisters are often filled with a clear liquid. They may feel very itchy. The blisters may pop. But they usually dont leave any scars. They may go away and come back again.  Treatment for bullous pemphigoid  The blisters from this skin disease may go away and come back. So treatment is often needed for a few years. Treatment options include:  · Skin care. Using mild soap and anti-itch creams may help with symptoms.  · Steroids. Steroids are the main treatment option for this disease. They may be creams or ointments that you put on your skin. Or they may be in pill or liquid form (oral). They can ease itching and stop new blisters from forming. But oral steroids may have side effects.  · Other medicines. If you cant take a steroid, other medicines may work. Some of these treat the disease by slowing down the immune system.  Possible complications of bullous pemphigoid  ·  Skin infection  When to call your healthcare provider  Call your healthcare provider right away if you have any of these:  · Fever of 100.4°F (38°C) or higher, or as directed  · Pain that gets worse  · Symptoms that dont get better, or get worse  · New symptoms   Date Last Reviewed: 5/1/2016  © 0819-7087 [employer]. [address] [place] [address], [place] [address]. All rights reserved. This information is not intended as a substitute for professional medical care. Always follow your healthcare professional's instructions.

## 2018-12-03 NOTE — PATIENT INSTRUCTIONS
FOLLOW UP WITH DERMATOLOGIST    APPLY CREAM TWICE A DAY    Understanding Bullous Pemphigoid    Bullous pemphigoid is a long-term (chronic) skin disease. It mostly affects people ages 60 and older. It causes large blisters to form. These blisters may be on one part of the body or all over. They often appear on the arms, legs, groin, chest, and stomach. Sores may sometimes occur in the mouth, too.  How to say it  BULL-us [id number]   What causes bullous pemphigoid?  Bullous pemphigoid is an autoimmune disease. Thats when the immune system attacks healthy parts of the body. In this case, it attacks the proteins that join the skin cells to one another. The disease may also be caused by some medicines, such as penicillin.  Symptoms of bullous pemphigoid  Bullous pemphigoid may first look like hives or eczema, because it can be red and itchy. Large blisters may then form. These blisters are often filled with a clear liquid. They may feel very itchy. The blisters may pop. But they usually dont leave any scars. They may go away and come back again.  Treatment for bullous pemphigoid  The blisters from this skin disease may go away and come back. So treatment is often needed for a few years. Treatment options include:  · Skin care. Using mild soap and anti-itch creams may help with symptoms.  · Steroids. Steroids are the main treatment option for this disease. They may be creams or ointments that you put on your skin. Or they may be in pill or liquid form (oral). They can ease itching and stop new blisters from forming. But oral steroids may have side effects.  · Other medicines. If you cant take a steroid, other medicines may work. Some of these treat the disease by slowing down the immune system.  Possible complications of bullous pemphigoid  ·  Skin infection  When to call your healthcare provider  Call your healthcare provider right away if you have any of these:  · Fever of 100.4°F (38°C) or higher, or as  directed  · Pain that gets worse  · Symptoms that dont get better, or get worse  · New symptoms   Date Last Reviewed: 5/1/2016  © 5931-8966 [employer], [employer]. [address] [place] [address], [place] [address]. All rights reserved. This information is not intended as a substitute for professional medical care. Always follow your healthcare professional's instructions.

## 2018-12-12 ENCOUNTER — INFUSION (OUTPATIENT)
Dept: INFUSION THERAPY | Facility: HOSPITAL | Age: 73
End: 2018-12-12
Attending: INTERNAL MEDICINE
Payer: MEDICARE

## 2018-12-12 DIAGNOSIS — Z85.72 HISTORY OF NON-HODGKIN'S LYMPHOMA: Primary | ICD-10-CM

## 2018-12-12 PROCEDURE — 63600175 PHARM REV CODE 636 W HCPCS: Performed by: INTERNAL MEDICINE

## 2018-12-12 PROCEDURE — 25000003 PHARM REV CODE 250: Performed by: INTERNAL MEDICINE

## 2018-12-12 PROCEDURE — A4216 STERILE WATER/SALINE, 10 ML: HCPCS | Performed by: INTERNAL MEDICINE

## 2018-12-12 PROCEDURE — 96523 IRRIG DRUG DELIVERY DEVICE: CPT

## 2018-12-12 RX ORDER — SODIUM CHLORIDE 0.9 % (FLUSH) 0.9 %
10 SYRINGE (ML) INJECTION
Status: DISCONTINUED | OUTPATIENT
Start: 2018-12-12 | End: 2018-12-12 | Stop reason: HOSPADM

## 2018-12-12 RX ORDER — HEPARIN 100 UNIT/ML
500 SYRINGE INTRAVENOUS
Status: DISCONTINUED | OUTPATIENT
Start: 2018-12-12 | End: 2018-12-12 | Stop reason: HOSPADM

## 2018-12-12 RX ADMIN — Medication 10 ML: at 08:12

## 2018-12-12 RX ADMIN — HEPARIN 500 UNITS: 100 SYRINGE at 08:12

## 2018-12-12 NOTE — PLAN OF CARE
Problem: Patient Care Overview  Goal: Plan of Care Review  Outcome: Ongoing (interventions implemented as appropriate)  Patient tolerated monthly portacath flush.

## 2019-01-16 ENCOUNTER — INFUSION (OUTPATIENT)
Dept: INFUSION THERAPY | Facility: HOSPITAL | Age: 74
End: 2019-01-16
Attending: INTERNAL MEDICINE
Payer: MEDICARE

## 2019-01-16 DIAGNOSIS — Z85.72 HISTORY OF NON-HODGKIN'S LYMPHOMA: Primary | ICD-10-CM

## 2019-01-16 PROCEDURE — 25000003 PHARM REV CODE 250: Performed by: INTERNAL MEDICINE

## 2019-01-16 PROCEDURE — 63600175 PHARM REV CODE 636 W HCPCS: Performed by: INTERNAL MEDICINE

## 2019-01-16 PROCEDURE — A4216 STERILE WATER/SALINE, 10 ML: HCPCS | Performed by: INTERNAL MEDICINE

## 2019-01-16 PROCEDURE — 96523 IRRIG DRUG DELIVERY DEVICE: CPT

## 2019-01-16 RX ORDER — SODIUM CHLORIDE 0.9 % (FLUSH) 0.9 %
10 SYRINGE (ML) INJECTION
Status: DISCONTINUED | OUTPATIENT
Start: 2019-01-16 | End: 2019-01-16 | Stop reason: HOSPADM

## 2019-01-16 RX ORDER — HEPARIN 100 UNIT/ML
500 SYRINGE INTRAVENOUS
Status: DISCONTINUED | OUTPATIENT
Start: 2019-01-16 | End: 2019-01-16 | Stop reason: HOSPADM

## 2019-01-16 RX ADMIN — Medication 10 ML: at 08:01

## 2019-01-16 RX ADMIN — HEPARIN 500 UNITS: 100 SYRINGE at 08:01

## 2019-01-16 NOTE — PLAN OF CARE
Problem: Adult Inpatient Plan of Care  Goal: Plan of Care Review  Outcome: Ongoing (interventions implemented as appropriate)  Arrived to unit. Tolerated port flush. No complaints voiced. Pt discharged, ambulatory.

## 2019-02-13 ENCOUNTER — INFUSION (OUTPATIENT)
Dept: INFUSION THERAPY | Facility: HOSPITAL | Age: 74
End: 2019-02-13
Attending: INTERNAL MEDICINE
Payer: MEDICARE

## 2019-02-13 DIAGNOSIS — Z85.72 HISTORY OF NON-HODGKIN'S LYMPHOMA: Primary | ICD-10-CM

## 2019-02-13 PROCEDURE — A4216 STERILE WATER/SALINE, 10 ML: HCPCS | Performed by: INTERNAL MEDICINE

## 2019-02-13 PROCEDURE — 63600175 PHARM REV CODE 636 W HCPCS: Performed by: INTERNAL MEDICINE

## 2019-02-13 PROCEDURE — 96523 IRRIG DRUG DELIVERY DEVICE: CPT

## 2019-02-13 PROCEDURE — 25000003 PHARM REV CODE 250: Performed by: INTERNAL MEDICINE

## 2019-02-13 RX ORDER — HEPARIN 100 UNIT/ML
500 SYRINGE INTRAVENOUS
Status: DISCONTINUED | OUTPATIENT
Start: 2019-02-13 | End: 2019-02-13 | Stop reason: HOSPADM

## 2019-02-13 RX ORDER — SODIUM CHLORIDE 0.9 % (FLUSH) 0.9 %
10 SYRINGE (ML) INJECTION
Status: DISCONTINUED | OUTPATIENT
Start: 2019-02-13 | End: 2019-02-13 | Stop reason: HOSPADM

## 2019-02-13 RX ADMIN — HEPARIN SODIUM (PORCINE) LOCK FLUSH IV SOLN 100 UNIT/ML 500 UNITS: 100 SOLUTION at 08:02

## 2019-02-13 RX ADMIN — Medication 10 ML: at 08:02

## 2019-02-13 NOTE — PLAN OF CARE
Problem: Adult Inpatient Plan of Care  Goal: Plan of Care Review  Outcome: Ongoing (interventions implemented as appropriate)  Pt presented for monthly port flush. Good blood return noted from port. Pt tolerated well. No complaints/concerns voiced. Ambulatory into and out of unit. Distress screening tool completed. Future appt information reviewed with pt.

## 2019-03-06 ENCOUNTER — OFFICE VISIT (OUTPATIENT)
Dept: URGENT CARE | Facility: CLINIC | Age: 74
End: 2019-03-06
Payer: MEDICARE

## 2019-03-06 VITALS
HEIGHT: 71 IN | HEART RATE: 87 BPM | TEMPERATURE: 98 F | RESPIRATION RATE: 20 BRPM | WEIGHT: 187 LBS | OXYGEN SATURATION: 99 % | SYSTOLIC BLOOD PRESSURE: 145 MMHG | BODY MASS INDEX: 26.18 KG/M2 | DIASTOLIC BLOOD PRESSURE: 85 MMHG

## 2019-03-06 DIAGNOSIS — R21 BULLOUS RASH: Primary | ICD-10-CM

## 2019-03-06 PROCEDURE — 1101F PT FALLS ASSESS-DOCD LE1/YR: CPT | Mod: CPTII,S$GLB,, | Performed by: SURGERY

## 2019-03-06 PROCEDURE — 3079F PR MOST RECENT DIASTOLIC BLOOD PRESSURE 80-89 MM HG: ICD-10-PCS | Mod: CPTII,S$GLB,, | Performed by: SURGERY

## 2019-03-06 PROCEDURE — 96372 THER/PROPH/DIAG INJ SC/IM: CPT | Mod: S$GLB,,, | Performed by: SURGERY

## 2019-03-06 PROCEDURE — 3079F DIAST BP 80-89 MM HG: CPT | Mod: CPTII,S$GLB,, | Performed by: SURGERY

## 2019-03-06 PROCEDURE — 96372 PR INJECTION,THERAP/PROPH/DIAG2ST, IM OR SUBCUT: ICD-10-PCS | Mod: S$GLB,,, | Performed by: SURGERY

## 2019-03-06 PROCEDURE — 3077F PR MOST RECENT SYSTOLIC BLOOD PRESSURE >= 140 MM HG: ICD-10-PCS | Mod: CPTII,S$GLB,, | Performed by: SURGERY

## 2019-03-06 PROCEDURE — 99214 OFFICE O/P EST MOD 30 MIN: CPT | Mod: 25,S$GLB,, | Performed by: SURGERY

## 2019-03-06 PROCEDURE — 3077F SYST BP >= 140 MM HG: CPT | Mod: CPTII,S$GLB,, | Performed by: SURGERY

## 2019-03-06 PROCEDURE — 1101F PR PT FALLS ASSESS DOC 0-1 FALLS W/OUT INJ PAST YR: ICD-10-PCS | Mod: CPTII,S$GLB,, | Performed by: SURGERY

## 2019-03-06 PROCEDURE — 99214 PR OFFICE/OUTPT VISIT, EST, LEVL IV, 30-39 MIN: ICD-10-PCS | Mod: 25,S$GLB,, | Performed by: SURGERY

## 2019-03-06 RX ORDER — METHYLPREDNISOLONE 4 MG/1
TABLET ORAL
Qty: 1 PACKAGE | Refills: 0 | Status: SHIPPED | OUTPATIENT
Start: 2019-03-06 | End: 2019-12-11

## 2019-03-06 RX ORDER — DEXAMETHASONE SODIUM PHOSPHATE 100 MG/10ML
5 INJECTION INTRAMUSCULAR; INTRAVENOUS ONCE
Status: COMPLETED | OUTPATIENT
Start: 2019-03-06 | End: 2019-03-06

## 2019-03-06 RX ADMIN — DEXAMETHASONE SODIUM PHOSPHATE 5 MG: 100 INJECTION INTRAMUSCULAR; INTRAVENOUS at 10:03

## 2019-03-06 NOTE — PROGRESS NOTES
"Subjective:       Patient ID: Long Najera is a 73 y.o. male.    Vitals:  height is 5' 11" (1.803 m) and weight is 84.8 kg (187 lb). His temperature is 97.7 °F (36.5 °C). His blood pressure is 145/85 (abnormal) and his pulse is 87. His respiration is 20 and oxygen saturation is 99%.     Chief Complaint: Rash    Pt states he was here a couple of weeks ago for a rash that's is all over his body. It itches and burns a little.       Rash   This is a new problem. The current episode started in the past 7 days. The problem is unchanged. The affected locations include the abdomen, back, chest, left arm and right arm. The rash is characterized by burning and itchiness. It is unknown if there was an exposure to a precipitant. Pertinent negatives include no cough, fever or sore throat. Past treatments include anti-itch cream.       Constitution: Negative for chills and fever.   HENT: Negative for facial swelling and sore throat.    Neck: Negative for painful lymph nodes.   Eyes: Negative for eye itching and eyelid swelling.   Respiratory: Negative for cough.    Musculoskeletal: Negative for joint pain and joint swelling.   Skin: Positive for rash. Negative for color change, pale, wound, abrasion, laceration, lesion, skin thickening/induration, puncture wound, erythema, abscess, avulsion and hives.   Allergic/Immunologic: Negative for environmental allergies, immunocompromised state and hives.   Hematologic/Lymphatic: Negative for swollen lymph nodes.       Objective:      Physical Exam   Constitutional: He is oriented to person, place, and time. He appears well-developed and well-nourished.   HENT:   Head: Normocephalic and atraumatic. Head is without abrasion, without contusion and without laceration.   Right Ear: External ear normal.   Left Ear: External ear normal.   Nose: Nose normal.   Mouth/Throat: Oropharynx is clear and moist.   Eyes: Conjunctivae, EOM and lids are normal. Pupils are equal, round, and reactive to " light.   Neck: Trachea normal, full passive range of motion without pain and phonation normal. Neck supple.   Cardiovascular: Normal rate, regular rhythm and normal heart sounds.   Pulmonary/Chest: Effort normal and breath sounds normal. No stridor. No respiratory distress.   Musculoskeletal: Normal range of motion.   Neurological: He is alert and oriented to person, place, and time.   Skin: Skin is warm, dry and intact. Capillary refill takes less than 2 seconds. Rash noted. No abrasion, no bruising, no burn, no ecchymosis, no laceration and no lesion noted. Rash is papular and vesicular. No erythema.        Psychiatric: He has a normal mood and affect. His speech is normal and behavior is normal. Judgment and thought content normal. Cognition and memory are normal.   Nursing note and vitals reviewed.      Assessment:       1. Bullous rash        Plan:         Bullous rash  -     dexamethasone injection 5 mg  -     methylPREDNISolone (MEDROL DOSEPACK) 4 mg tablet; use as directed  Dispense: 1 Package; Refill: 0    Follow up with dermatologist. Continue cream prn.    Patient Instructions       Self-Care for Skin Rashes     Pat your skin dry. Do not rub.     When your skin reacts to a substance your body is sensitive to, it can cause a rash. You can treat most rashes at home by keeping the skin clean and dry. Many rashes may get better on their own within 2 to 3 days. You may need medical attention if your rash itches, drains, or hurts, particularly if the rash is getting worse.  What can cause a skin rash?  · Sun poisoning, caused by too much exposure to the sun  · An irritant or allergic reaction to a certain type of food, plant, or chemical, such as  shellfish, poison ivy, and or cleaning products  · An infection caused by a fungus (ringworm), virus (chickenpox), or bacteria (strep)  · Bites or infestation caused by insects or pests, such as ticks, lice, or mites  · Dry skin, which is often seen during the winter  months and in older people  How can I control itching and skin damage?  · Take soothing lukewarm baths in a colloidal oatmeal product. You can buy this at the [facility] [employer].  · Do your best not to scratch. Clip fingernails short, especially in young children, to reduce skin damage if scratching does occur.  · Use moisturizing skin lotion instead of scratching your dry skin.  · Use sunscreen whenever going out into direct sun.  · Use only mild cleansing agents whenever possible.  · Wash with mild, nonirritating soap and warm water.  · Wear clothing that breathes, such as cotton shirts or canvas shoes.  · If fluid is seeping from the rash, cover it loosely with clean gauze to absorb the discharge.  · Many rashes are contagious. Prevent the rash from spreading to others by washing your hands often before or after touching others with any skin rash.  Use medicine  · Antihistamines such as diphenhydramine can help control itching. But use with caution because they can make you drowsy.  · Using over-the-counter hydrocortisone cream on small rashes may help reduce swelling and itching  · Most over-the-counter antifungal medicines can treat athletes foot and many other fungal infections of the skin.  Check with your healthcare provider  Call your healthcare provider if:  · You were told that you have a fungal infection on your skin to make sure you have the correct type of medicine.  · You have questions or concerns about medicines or their side effects.     Call 911  Call 911 if either of these occur:  · Your tongue or lips start to swell  · You have difficulty breathing      Call your healthcare provider  Call your healthcare provider if any of these occur:  · Temperature of more than 101.0°F (38.3°C), or as directed  · Sore throat, a cough, or unusual fatigue  · Red, oozy, or painful rash gets worse. These are signs of infection.  · Rash covers your face, genitals, or most of your body  · Crusty sores or red rings that  begin to spread  · You were exposed to someone who has a contagious rash, such as scabies or lice.  · Red bulls-eye rash with a white center (a sign of Lyme disease)  · You were told that you have resistant bacteria (MRSA) on your skin.   Date Last Reviewed: 5/12/2015  © 7712-2457 [employer]. [address] [place] [address]. All rights reserved. This information is not intended as a substitute for professional medical care. Always follow your healthcare professional's instructions.

## 2019-03-06 NOTE — PATIENT INSTRUCTIONS
Self-Care for Skin Rashes     Pat your skin dry. Do not rub.     When your skin reacts to a substance your body is sensitive to, it can cause a rash. You can treat most rashes at home by keeping the skin clean and dry. Many rashes may get better on their own within 2 to 3 days. You may need medical attention if your rash itches, drains, or hurts, particularly if the rash is getting worse.  What can cause a skin rash?  · Sun poisoning, caused by too much exposure to the sun  · An irritant or allergic reaction to a certain type of food, plant, or chemical, such as  shellfish, poison ivy, and or cleaning products  · An infection caused by a fungus (ringworm), virus (chickenpox), or bacteria (strep)  · Bites or infestation caused by insects or pests, such as ticks, lice, or mites  · Dry skin, which is often seen during the winter months and in older people  How can I control itching and skin damage?  · Take soothing lukewarm baths in a colloidal oatmeal product. You can buy this at the [employer].  · Do your best not to scratch. Clip fingernails short, especially in young children, to reduce skin damage if scratching does occur.  · Use moisturizing skin lotion instead of scratching your dry skin.  · Use sunscreen whenever going out into direct sun.  · Use only mild cleansing agents whenever possible.  · Wash with mild, nonirritating soap and warm water.  · Wear clothing that breathes, such as cotton shirts or canvas shoes.  · If fluid is seeping from the rash, cover it loosely with clean gauze to absorb the discharge.  · Many rashes are contagious. Prevent the rash from spreading to others by washing your hands often before or after touching others with any skin rash.  Use medicine  · Antihistamines such as diphenhydramine can help control itching. But use with caution because they can make you drowsy.  · Using over-the-counter hydrocortisone cream on small rashes may help reduce swelling and itching  · Most  over-the-counter antifungal medicines can treat athletes foot and many other fungal infections of the skin.  Check with your healthcare provider  Call your healthcare provider if:  · You were told that you have a fungal infection on your skin to make sure you have the correct type of medicine.  · You have questions or concerns about medicines or their side effects.     Call 911  Call 911 if either of these occur:  · Your tongue or lips start to swell  · You have difficulty breathing      Call your healthcare provider  Call your healthcare provider if any of these occur:  · Temperature of more than 101.0°F (38.3°C), or as directed  · Sore throat, a cough, or unusual fatigue  · Red, oozy, or painful rash gets worse. These are signs of infection.  · Rash covers your face, genitals, or most of your body  · Crusty sores or red rings that begin to spread  · You were exposed to someone who has a contagious rash, such as scabies or lice.  · Red bulls-eye rash with a white center (a sign of Lyme disease)  · You were told that you have resistant bacteria (MRSA) on your skin.   Date Last Reviewed: 5/12/2015  © 7844-4520 [employer]. [address] [place] [address], [place] [address]. All rights reserved. This information is not intended as a substitute for professional medical care. Always follow your healthcare professional's instructions.

## 2019-03-13 ENCOUNTER — INFUSION (OUTPATIENT)
Dept: INFUSION THERAPY | Facility: HOSPITAL | Age: 74
End: 2019-03-13
Attending: INTERNAL MEDICINE
Payer: MEDICARE

## 2019-03-13 DIAGNOSIS — Z85.72 HISTORY OF NON-HODGKIN'S LYMPHOMA: Primary | ICD-10-CM

## 2019-03-13 PROCEDURE — 25000003 PHARM REV CODE 250: Performed by: INTERNAL MEDICINE

## 2019-03-13 PROCEDURE — 63600175 PHARM REV CODE 636 W HCPCS: Performed by: INTERNAL MEDICINE

## 2019-03-13 PROCEDURE — 96523 IRRIG DRUG DELIVERY DEVICE: CPT

## 2019-03-13 PROCEDURE — A4216 STERILE WATER/SALINE, 10 ML: HCPCS | Performed by: INTERNAL MEDICINE

## 2019-03-13 RX ORDER — SODIUM CHLORIDE 0.9 % (FLUSH) 0.9 %
10 SYRINGE (ML) INJECTION
Status: DISCONTINUED | OUTPATIENT
Start: 2019-03-13 | End: 2019-03-13 | Stop reason: HOSPADM

## 2019-03-13 RX ORDER — HEPARIN 100 UNIT/ML
500 SYRINGE INTRAVENOUS
Status: DISCONTINUED | OUTPATIENT
Start: 2019-03-13 | End: 2019-03-13 | Stop reason: HOSPADM

## 2019-03-13 RX ADMIN — Medication 10 ML: at 08:03

## 2019-03-13 RX ADMIN — HEPARIN SODIUM (PORCINE) LOCK FLUSH IV SOLN 100 UNIT/ML 500 UNITS: 100 SOLUTION at 08:03

## 2019-03-13 NOTE — PLAN OF CARE
Problem: Adult Inpatient Plan of Care  Goal: Plan of Care Review  Outcome: Ongoing (interventions implemented as appropriate)  Pt presented for monthly port flush. Tolerated well. Good blood return noted from port. No complaints/concerns voiced. Ambulatory into and out of unit. Distress screening tool completed. Future appt information reviewed with pt.

## 2019-04-10 ENCOUNTER — INFUSION (OUTPATIENT)
Dept: INFUSION THERAPY | Facility: HOSPITAL | Age: 74
End: 2019-04-10
Attending: INTERNAL MEDICINE
Payer: MEDICARE

## 2019-04-10 DIAGNOSIS — Z85.72 HISTORY OF NON-HODGKIN'S LYMPHOMA: Primary | ICD-10-CM

## 2019-04-10 PROCEDURE — A4216 STERILE WATER/SALINE, 10 ML: HCPCS | Performed by: INTERNAL MEDICINE

## 2019-04-10 PROCEDURE — 96523 IRRIG DRUG DELIVERY DEVICE: CPT

## 2019-04-10 PROCEDURE — 63600175 PHARM REV CODE 636 W HCPCS: Performed by: INTERNAL MEDICINE

## 2019-04-10 PROCEDURE — 25000003 PHARM REV CODE 250: Performed by: INTERNAL MEDICINE

## 2019-04-10 RX ORDER — SODIUM CHLORIDE 0.9 % (FLUSH) 0.9 %
10 SYRINGE (ML) INJECTION
Status: DISCONTINUED | OUTPATIENT
Start: 2019-04-10 | End: 2019-04-10 | Stop reason: HOSPADM

## 2019-04-10 RX ORDER — HEPARIN 100 UNIT/ML
500 SYRINGE INTRAVENOUS
Status: DISCONTINUED | OUTPATIENT
Start: 2019-04-10 | End: 2019-04-10 | Stop reason: HOSPADM

## 2019-04-10 RX ADMIN — Medication 10 ML: at 08:04

## 2019-04-10 RX ADMIN — HEPARIN SODIUM (PORCINE) LOCK FLUSH IV SOLN 100 UNIT/ML 500 UNITS: 100 SOLUTION at 08:04

## 2019-04-10 NOTE — PLAN OF CARE
Problem: Adult Inpatient Plan of Care  Goal: Plan of Care Review  Outcome: Ongoing (interventions implemented as appropriate)  Pt presented for monthly port flush. No complaints/concerns voiced. Pt tolerated well. Good blood return noted from port. Port flushed and heparinized. Ambulatory into and out of unit. Distress screening tool completed. Future appt information reviewed with pt.

## 2019-05-08 ENCOUNTER — INFUSION (OUTPATIENT)
Dept: INFUSION THERAPY | Facility: HOSPITAL | Age: 74
End: 2019-05-08
Attending: INTERNAL MEDICINE
Payer: MEDICARE

## 2019-05-08 DIAGNOSIS — Z85.72 HISTORY OF NON-HODGKIN'S LYMPHOMA: Primary | ICD-10-CM

## 2019-05-08 PROCEDURE — A4216 STERILE WATER/SALINE, 10 ML: HCPCS | Performed by: INTERNAL MEDICINE

## 2019-05-08 PROCEDURE — 25000003 PHARM REV CODE 250: Performed by: INTERNAL MEDICINE

## 2019-05-08 PROCEDURE — 96523 IRRIG DRUG DELIVERY DEVICE: CPT

## 2019-05-08 PROCEDURE — 63600175 PHARM REV CODE 636 W HCPCS: Performed by: INTERNAL MEDICINE

## 2019-05-08 RX ORDER — HEPARIN 100 UNIT/ML
500 SYRINGE INTRAVENOUS
Status: CANCELLED | OUTPATIENT
Start: 2019-05-08

## 2019-05-08 RX ORDER — SODIUM CHLORIDE 0.9 % (FLUSH) 0.9 %
10 SYRINGE (ML) INJECTION
Status: CANCELLED | OUTPATIENT
Start: 2019-05-08

## 2019-05-08 RX ORDER — SODIUM CHLORIDE 0.9 % (FLUSH) 0.9 %
10 SYRINGE (ML) INJECTION
Status: DISCONTINUED | OUTPATIENT
Start: 2019-05-08 | End: 2019-05-08 | Stop reason: HOSPADM

## 2019-05-08 RX ORDER — HEPARIN 100 UNIT/ML
500 SYRINGE INTRAVENOUS
Status: DISCONTINUED | OUTPATIENT
Start: 2019-05-08 | End: 2019-05-08 | Stop reason: HOSPADM

## 2019-05-08 RX ADMIN — HEPARIN SODIUM (PORCINE) LOCK FLUSH IV SOLN 100 UNIT/ML 500 UNITS: 100 SOLUTION at 08:05

## 2019-05-08 RX ADMIN — Medication 10 ML: at 08:05

## 2019-05-08 NOTE — PLAN OF CARE
Problem: Adult Inpatient Plan of Care  Goal: Plan of Care Review  Outcome: Ongoing (interventions implemented as appropriate)  Patient tolerated monthly portacath flush. No current concerns/complaints. Ambulated unassisted off unit.

## 2019-06-07 ENCOUNTER — INFUSION (OUTPATIENT)
Dept: INFUSION THERAPY | Facility: HOSPITAL | Age: 74
End: 2019-06-07
Attending: INTERNAL MEDICINE
Payer: MEDICARE

## 2019-06-07 VITALS
OXYGEN SATURATION: 98 % | SYSTOLIC BLOOD PRESSURE: 121 MMHG | DIASTOLIC BLOOD PRESSURE: 71 MMHG | TEMPERATURE: 98 F | HEART RATE: 91 BPM | RESPIRATION RATE: 18 BRPM

## 2019-06-07 DIAGNOSIS — Z85.72 HISTORY OF NON-HODGKIN'S LYMPHOMA: Primary | ICD-10-CM

## 2019-06-07 PROCEDURE — 25000003 PHARM REV CODE 250: Performed by: INTERNAL MEDICINE

## 2019-06-07 PROCEDURE — A4216 STERILE WATER/SALINE, 10 ML: HCPCS | Performed by: INTERNAL MEDICINE

## 2019-06-07 PROCEDURE — 63600175 PHARM REV CODE 636 W HCPCS: Performed by: INTERNAL MEDICINE

## 2019-06-07 PROCEDURE — 96523 IRRIG DRUG DELIVERY DEVICE: CPT

## 2019-06-07 RX ORDER — SODIUM CHLORIDE 0.9 % (FLUSH) 0.9 %
10 SYRINGE (ML) INJECTION
Status: DISCONTINUED | OUTPATIENT
Start: 2019-06-07 | End: 2019-06-07 | Stop reason: HOSPADM

## 2019-06-07 RX ORDER — HEPARIN 100 UNIT/ML
500 SYRINGE INTRAVENOUS
Status: DISCONTINUED | OUTPATIENT
Start: 2019-06-07 | End: 2019-06-07 | Stop reason: HOSPADM

## 2019-06-07 RX ADMIN — Medication 10 ML: at 08:06

## 2019-06-07 RX ADMIN — HEPARIN SODIUM (PORCINE) LOCK FLUSH IV SOLN 100 UNIT/ML 500 UNITS: 100 SOLUTION at 08:06

## 2019-07-03 ENCOUNTER — INFUSION (OUTPATIENT)
Dept: INFUSION THERAPY | Facility: HOSPITAL | Age: 74
End: 2019-07-03
Attending: INTERNAL MEDICINE
Payer: MEDICARE

## 2019-07-03 VITALS
TEMPERATURE: 98 F | OXYGEN SATURATION: 97 % | RESPIRATION RATE: 18 BRPM | DIASTOLIC BLOOD PRESSURE: 73 MMHG | SYSTOLIC BLOOD PRESSURE: 131 MMHG | HEART RATE: 89 BPM

## 2019-07-03 DIAGNOSIS — Z85.72 HISTORY OF NON-HODGKIN'S LYMPHOMA: Primary | ICD-10-CM

## 2019-07-03 PROCEDURE — A4216 STERILE WATER/SALINE, 10 ML: HCPCS | Performed by: INTERNAL MEDICINE

## 2019-07-03 PROCEDURE — 25000003 PHARM REV CODE 250: Performed by: INTERNAL MEDICINE

## 2019-07-03 PROCEDURE — 96523 IRRIG DRUG DELIVERY DEVICE: CPT

## 2019-07-03 PROCEDURE — 63600175 PHARM REV CODE 636 W HCPCS: Performed by: INTERNAL MEDICINE

## 2019-07-03 RX ORDER — HEPARIN 100 UNIT/ML
500 SYRINGE INTRAVENOUS
Status: DISCONTINUED | OUTPATIENT
Start: 2019-07-03 | End: 2019-07-03 | Stop reason: HOSPADM

## 2019-07-03 RX ORDER — SODIUM CHLORIDE 0.9 % (FLUSH) 0.9 %
10 SYRINGE (ML) INJECTION
Status: DISCONTINUED | OUTPATIENT
Start: 2019-07-03 | End: 2019-07-03 | Stop reason: HOSPADM

## 2019-07-03 RX ADMIN — HEPARIN 500 UNITS: 100 SYRINGE at 08:07

## 2019-07-03 RX ADMIN — Medication 10 ML: at 08:07

## 2019-07-03 NOTE — PLAN OF CARE
Problem: Adult Inpatient Plan of Care  Goal: Plan of Care Review  Outcome: Ongoing (interventions implemented as appropriate)  Pt presented for monthly port flush. VSS. No complaints/concerns voiced this visit. Pt tolerated flush well. Good blood return noted from port. Port flushed and heparinized. Ambulatory into and out of unit. Distress screening tool completed. Future appt information reviewed with pt.

## 2019-08-07 ENCOUNTER — INFUSION (OUTPATIENT)
Dept: INFUSION THERAPY | Facility: HOSPITAL | Age: 74
End: 2019-08-07
Attending: INTERNAL MEDICINE
Payer: MEDICARE

## 2019-08-07 VITALS
HEART RATE: 95 BPM | SYSTOLIC BLOOD PRESSURE: 125 MMHG | OXYGEN SATURATION: 98 % | RESPIRATION RATE: 18 BRPM | TEMPERATURE: 98 F | DIASTOLIC BLOOD PRESSURE: 69 MMHG

## 2019-08-07 DIAGNOSIS — Z85.72 HISTORY OF NON-HODGKIN'S LYMPHOMA: Primary | ICD-10-CM

## 2019-08-07 PROCEDURE — 25000003 PHARM REV CODE 250: Performed by: INTERNAL MEDICINE

## 2019-08-07 PROCEDURE — 63600175 PHARM REV CODE 636 W HCPCS: Performed by: INTERNAL MEDICINE

## 2019-08-07 PROCEDURE — 96523 IRRIG DRUG DELIVERY DEVICE: CPT

## 2019-08-07 PROCEDURE — A4216 STERILE WATER/SALINE, 10 ML: HCPCS | Performed by: INTERNAL MEDICINE

## 2019-08-07 RX ORDER — SODIUM CHLORIDE 0.9 % (FLUSH) 0.9 %
10 SYRINGE (ML) INJECTION
Status: DISCONTINUED | OUTPATIENT
Start: 2019-08-07 | End: 2019-08-07 | Stop reason: HOSPADM

## 2019-08-07 RX ORDER — HEPARIN 100 UNIT/ML
500 SYRINGE INTRAVENOUS
Status: DISCONTINUED | OUTPATIENT
Start: 2019-08-07 | End: 2019-08-07 | Stop reason: HOSPADM

## 2019-08-07 RX ADMIN — Medication 10 ML: at 08:08

## 2019-08-07 RX ADMIN — HEPARIN 500 UNITS: 100 SYRINGE at 08:08

## 2019-08-07 NOTE — PLAN OF CARE
Problem: Adult Inpatient Plan of Care  Goal: Plan of Care Review  Outcome: Ongoing (interventions implemented as appropriate)  Arrived to unit. VSS. No complaints voiced. Tolerated port flush. Pt has next appt. Pt ambulated off unit, no distress noted.

## 2019-09-11 ENCOUNTER — INFUSION (OUTPATIENT)
Dept: INFUSION THERAPY | Facility: HOSPITAL | Age: 74
End: 2019-09-11
Attending: INTERNAL MEDICINE
Payer: MEDICARE

## 2019-09-11 VITALS
RESPIRATION RATE: 18 BRPM | TEMPERATURE: 98 F | OXYGEN SATURATION: 97 % | DIASTOLIC BLOOD PRESSURE: 81 MMHG | HEART RATE: 67 BPM | SYSTOLIC BLOOD PRESSURE: 154 MMHG

## 2019-09-11 DIAGNOSIS — Z85.72 HISTORY OF NON-HODGKIN'S LYMPHOMA: Primary | ICD-10-CM

## 2019-09-11 PROCEDURE — 25000003 PHARM REV CODE 250: Performed by: INTERNAL MEDICINE

## 2019-09-11 PROCEDURE — 96523 IRRIG DRUG DELIVERY DEVICE: CPT

## 2019-09-11 PROCEDURE — A4216 STERILE WATER/SALINE, 10 ML: HCPCS | Performed by: INTERNAL MEDICINE

## 2019-09-11 PROCEDURE — 63600175 PHARM REV CODE 636 W HCPCS: Performed by: INTERNAL MEDICINE

## 2019-09-11 RX ORDER — HEPARIN 100 UNIT/ML
500 SYRINGE INTRAVENOUS
Status: DISCONTINUED | OUTPATIENT
Start: 2019-09-11 | End: 2019-09-11 | Stop reason: HOSPADM

## 2019-09-11 RX ORDER — SODIUM CHLORIDE 0.9 % (FLUSH) 0.9 %
10 SYRINGE (ML) INJECTION
Status: DISCONTINUED | OUTPATIENT
Start: 2019-09-11 | End: 2019-09-11 | Stop reason: HOSPADM

## 2019-09-11 RX ADMIN — Medication 10 ML: at 08:09

## 2019-09-11 RX ADMIN — HEPARIN 500 UNITS: 100 SYRINGE at 08:09

## 2019-09-11 NOTE — PLAN OF CARE
Problem: Adult Inpatient Plan of Care  Goal: Plan of Care Review  Outcome: Ongoing (interventions implemented as appropriate)  Patient tolerated monthly portacath flush. VSS. Patient denies any new or worsening symptoms. Patient received discharge instructions and follow up appointments. Verbalized understanding and ambulated unassisted off unit by self. Patient in NAD at time of discharge.

## 2019-10-09 ENCOUNTER — INFUSION (OUTPATIENT)
Dept: INFUSION THERAPY | Facility: HOSPITAL | Age: 74
End: 2019-10-09
Attending: INTERNAL MEDICINE
Payer: MEDICARE

## 2019-10-09 DIAGNOSIS — Z85.72 HISTORY OF NON-HODGKIN'S LYMPHOMA: Primary | ICD-10-CM

## 2019-10-09 PROCEDURE — 25000003 PHARM REV CODE 250: Performed by: INTERNAL MEDICINE

## 2019-10-09 PROCEDURE — A4216 STERILE WATER/SALINE, 10 ML: HCPCS | Performed by: INTERNAL MEDICINE

## 2019-10-09 PROCEDURE — 96523 IRRIG DRUG DELIVERY DEVICE: CPT

## 2019-10-09 PROCEDURE — 63600175 PHARM REV CODE 636 W HCPCS: Performed by: INTERNAL MEDICINE

## 2019-10-09 RX ORDER — SODIUM CHLORIDE 0.9 % (FLUSH) 0.9 %
10 SYRINGE (ML) INJECTION
Status: DISCONTINUED | OUTPATIENT
Start: 2019-10-09 | End: 2019-10-09 | Stop reason: HOSPADM

## 2019-10-09 RX ORDER — HEPARIN 100 UNIT/ML
500 SYRINGE INTRAVENOUS
Status: DISCONTINUED | OUTPATIENT
Start: 2019-10-09 | End: 2019-10-09 | Stop reason: HOSPADM

## 2019-10-09 RX ADMIN — HEPARIN SODIUM (PORCINE) LOCK FLUSH IV SOLN 100 UNIT/ML 500 UNITS: 100 SOLUTION at 08:10

## 2019-10-09 RX ADMIN — Medication 10 ML: at 08:10

## 2019-10-09 NOTE — PLAN OF CARE
Pt presented for monthly port flush. No complaints/concerns voiced. Tolerated flush. Good blood return noted from port. Port flushed and heparinized. Distress screening tool completed. Ambulated independently into and out of unit.

## 2019-11-06 ENCOUNTER — INFUSION (OUTPATIENT)
Dept: INFUSION THERAPY | Facility: HOSPITAL | Age: 74
End: 2019-11-06
Attending: INTERNAL MEDICINE
Payer: MEDICARE

## 2019-11-06 VITALS
TEMPERATURE: 98 F | OXYGEN SATURATION: 98 % | SYSTOLIC BLOOD PRESSURE: 150 MMHG | DIASTOLIC BLOOD PRESSURE: 77 MMHG | HEART RATE: 89 BPM | RESPIRATION RATE: 17 BRPM

## 2019-11-06 DIAGNOSIS — Z85.72 HISTORY OF NON-HODGKIN'S LYMPHOMA: Primary | ICD-10-CM

## 2019-11-06 PROCEDURE — A4216 STERILE WATER/SALINE, 10 ML: HCPCS | Performed by: INTERNAL MEDICINE

## 2019-11-06 PROCEDURE — 96523 IRRIG DRUG DELIVERY DEVICE: CPT

## 2019-11-06 PROCEDURE — 63600175 PHARM REV CODE 636 W HCPCS: Performed by: INTERNAL MEDICINE

## 2019-11-06 PROCEDURE — 25000003 PHARM REV CODE 250: Performed by: INTERNAL MEDICINE

## 2019-11-06 RX ORDER — SODIUM CHLORIDE 0.9 % (FLUSH) 0.9 %
10 SYRINGE (ML) INJECTION
Status: DISCONTINUED | OUTPATIENT
Start: 2019-11-06 | End: 2019-11-06 | Stop reason: HOSPADM

## 2019-11-06 RX ORDER — HEPARIN 100 UNIT/ML
500 SYRINGE INTRAVENOUS
Status: DISCONTINUED | OUTPATIENT
Start: 2019-11-06 | End: 2019-11-06 | Stop reason: HOSPADM

## 2019-11-06 RX ADMIN — HEPARIN 500 UNITS: 100 SYRINGE at 08:11

## 2019-11-06 RX ADMIN — Medication 10 ML: at 08:11

## 2019-11-06 NOTE — PLAN OF CARE
Patient arrived to infusion center for monthly port flush. Tolerated well. VSS. Discharge instructions provided. Patient instructed to return 12/4/19. Patient ambulated unassisted off unit, in NAD at time of discharge.

## 2019-12-04 ENCOUNTER — INFUSION (OUTPATIENT)
Dept: INFUSION THERAPY | Facility: HOSPITAL | Age: 74
End: 2019-12-04
Attending: INTERNAL MEDICINE
Payer: MEDICARE

## 2019-12-04 VITALS
OXYGEN SATURATION: 98 % | RESPIRATION RATE: 17 BRPM | HEART RATE: 87 BPM | TEMPERATURE: 98 F | DIASTOLIC BLOOD PRESSURE: 71 MMHG | SYSTOLIC BLOOD PRESSURE: 136 MMHG

## 2019-12-04 DIAGNOSIS — Z85.72 HISTORY OF NON-HODGKIN'S LYMPHOMA: Primary | ICD-10-CM

## 2019-12-04 PROCEDURE — A4216 STERILE WATER/SALINE, 10 ML: HCPCS | Performed by: INTERNAL MEDICINE

## 2019-12-04 PROCEDURE — 63600175 PHARM REV CODE 636 W HCPCS: Performed by: INTERNAL MEDICINE

## 2019-12-04 PROCEDURE — 25000003 PHARM REV CODE 250: Performed by: INTERNAL MEDICINE

## 2019-12-04 PROCEDURE — 96523 IRRIG DRUG DELIVERY DEVICE: CPT

## 2019-12-04 RX ORDER — HEPARIN 100 UNIT/ML
500 SYRINGE INTRAVENOUS
Status: DISCONTINUED | OUTPATIENT
Start: 2019-12-04 | End: 2019-12-04 | Stop reason: HOSPADM

## 2019-12-04 RX ORDER — SODIUM CHLORIDE 0.9 % (FLUSH) 0.9 %
10 SYRINGE (ML) INJECTION
Status: DISCONTINUED | OUTPATIENT
Start: 2019-12-04 | End: 2019-12-04 | Stop reason: HOSPADM

## 2019-12-04 RX ADMIN — HEPARIN 500 UNITS: 100 SYRINGE at 08:12

## 2019-12-04 RX ADMIN — Medication 10 ML: at 08:12

## 2019-12-04 NOTE — PLAN OF CARE
Pt arrived to unit. No complaints voiced. VSS. Tolerated port flush. Pt has next appt. Pt ambulated off unit. No distress noted.

## 2019-12-11 ENCOUNTER — OFFICE VISIT (OUTPATIENT)
Dept: URGENT CARE | Facility: CLINIC | Age: 74
End: 2019-12-11
Payer: MEDICARE

## 2019-12-11 VITALS
OXYGEN SATURATION: 100 % | BODY MASS INDEX: 26.18 KG/M2 | SYSTOLIC BLOOD PRESSURE: 163 MMHG | DIASTOLIC BLOOD PRESSURE: 98 MMHG | WEIGHT: 187 LBS | HEART RATE: 86 BPM | HEIGHT: 71 IN | TEMPERATURE: 97 F

## 2019-12-11 DIAGNOSIS — L13.9 BULLOUS DERMATITIS: Primary | ICD-10-CM

## 2019-12-11 PROCEDURE — 99214 PR OFFICE/OUTPT VISIT, EST, LEVL IV, 30-39 MIN: ICD-10-PCS | Mod: S$GLB,,, | Performed by: NURSE PRACTITIONER

## 2019-12-11 PROCEDURE — 99214 OFFICE O/P EST MOD 30 MIN: CPT | Mod: S$GLB,,, | Performed by: NURSE PRACTITIONER

## 2019-12-11 RX ORDER — METHYLPREDNISOLONE 4 MG/1
TABLET ORAL
Qty: 1 PACKAGE | Refills: 0 | Status: SHIPPED | OUTPATIENT
Start: 2019-12-11 | End: 2019-12-17

## 2019-12-11 NOTE — PATIENT INSTRUCTIONS
Please be aware your blood pressure was slightly elevated today -  Make sure to take your blood pressure medicines, eat a low salt diet and recheck your blood pressure to make sure it is not getting too elevated ( greater than 160/100).   Advised pt to take bp again when well - if still elevated f/u with pcp.     Please return here or go to the Emergency Department for any concerns or worsening of condition.  If you were prescribed antibiotics, please take them to completion.  If you were prescribed a narcotic medication, do not drive or operate heavy equipment or machinery while taking these medications.  Please follow up with your primary care doctor or specialist as needed.  If you  smoke, please stop smoking.  Nonspecific Dermatitis  Dermatitis is a skin rash caused by something that touches the skin and makes it irritated and inflamed.  Your skin may be red, swollen, dry, and may be cracked. Blisters may form and ooze. The rash will itch.  Dermatitis can form on the face and neck, backs of hands, forearms, genitals, and lower legs. Dermatitis is not passed from person to person.  Talk with your health care provider about what may have caused the rash. Common things that cause skin allergies are metal in jewelry, plants like poison ivy or poison oak, and certain skin care products. You will need to avoid the source of your rash in the future to prevent it from coming back. In some cases, the cause of the dermatitis may not be found.  Treatment is done to relieve itching and prevent the rash from coming back. The rash should go away in a few days to a few weeks.  Home care  The health care provider may prescribe medications to relieve swelling and itching. Follow all instructions when using these medications.  · Avoid anything that heats up your skin, such as hot showers or baths, or direct sunlight. This can make itching worse.  · Stay away from whatever you think caused the rash.  · Bathe in warm, not hot,  water. Apply a moisturizing lotion after bathing to prevent dry skin.  · Avoid skin irritants such as wool or silk clothing, grease, oils, harsh soaps, and detergents.  · Apply cold compresses to soothe your sores to help relieve your symptoms. Do this for 30 minutes 3 to 4 times a day. You can make a cold compress by soaking a cloth in cold water. Squeeze out excess water. You can add colloidal oatmeal to the water to help reduce itching. For severe itching in a small area, apply an ice pack wrapped in a thin towel. Do this for 20 minutes 3 to 4 times a day.  · You can also help relieve large areas of itching by taking a lukewarm bath with colloidal oatmeal added to the water.  · Use hydrocortisone cream for redness and irritation, unless another medicine was prescribed. You can also use benzocaine anesthetic cream or spray.  · Use oral diphenhydramine to help reduce itching. This is an antihistamine you can buy at drug and grocery stores. It can make you sleepy, so use lower doses during the daytime. Or you can use loratadine. This is an antihistamine that will not make you sleepy. Dont use diphenhydramine if you have glaucoma or have trouble urinating because of an enlarged prostate.  · Wash your hands or use an antibacterial gel often to prevent the spread of the rash.  Follow-up care  Follow up with your health care provider. Make an appointment with your health care provider if your symptoms do not get better in the next 1 to 2 weeks.  When to seek medical advice  Call your health care provider right away if any of these occur:  · Spreading of the rash to other parts of your body  · Severe swelling of your face, eyelids, mouth, throat or tongue  · Trouble urinating due to swelling in the genital area  · Fever of 100.4°F (38°C) or higher  · Redness or swelling that gets worse  · Pain that gets worse  · Foul-smelling fluid leaking from the skin  · Yellow-brown crusts on the open blisters  · Joint pain   Date  Last Reviewed: 7/23/2014  © 5473-8476 [employer]. [address] [place] [address], [place] [address]. All rights reserved. This information is not intended as a substitute for professional medical care. Always follow your healthcare professional's instructions.

## 2019-12-11 NOTE — PROGRESS NOTES
"Subjective:       Patient ID: Long Najera is a 74 y.o. male.    Vitals:  height is 5' 11" (1.803 m) and weight is 84.8 kg (187 lb). His temperature is 96.9 °F (36.1 °C). His blood pressure is 163/98 (abnormal) and his pulse is 86. His oxygen saturation is 100%.     Chief Complaint: Rash    Pt states he has a chronic skin rash, he is requesting a steroid shot, he is taking Halobetasol,he c/o itching, he does have a derm dr but reports he is unable to get appointment with the dermatologist until next year and itching is getting worse, cream is not working anymore, BP high in clinic patient does take blood pressure medicine    Rash   This is a chronic problem. The affected locations include the left arm, back, chest, left upper leg, right arm and right upper leg. The rash is characterized by itchiness and dryness. It is unknown if there was an exposure to a precipitant. Pertinent negatives include no cough, fever or sore throat. Treatments tried: Halobetasol.       Constitution: Negative for chills and fever.   HENT: Negative for facial swelling and sore throat.    Neck: Negative for painful lymph nodes.   Eyes: Negative for eye itching and eyelid swelling.   Respiratory: Negative for cough.    Musculoskeletal: Negative for joint pain and joint swelling.   Skin: Positive for rash. Negative for color change, pale, wound, abrasion, laceration, lesion, skin thickening/induration, puncture wound, erythema, abscess, avulsion and hives.   Allergic/Immunologic: Negative for environmental allergies, immunocompromised state and hives.   Hematologic/Lymphatic: Negative for swollen lymph nodes.       Objective:      Physical Exam   Constitutional: He is oriented to person, place, and time. He appears well-developed and well-nourished.   HENT:   Head: Normocephalic and atraumatic. Head is without abrasion, without contusion and without laceration.   Right Ear: External ear normal.   Left Ear: External ear normal.   Nose: " Nose normal.   Mouth/Throat: Oropharynx is clear and moist and mucous membranes are normal.   Eyes: Pupils are equal, round, and reactive to light. Conjunctivae, EOM and lids are normal.   Neck: Trachea normal, full passive range of motion without pain and phonation normal. Neck supple.   Cardiovascular: Normal rate, regular rhythm and normal heart sounds.   Pulmonary/Chest: Effort normal and breath sounds normal. No stridor. No respiratory distress.   Musculoskeletal: Normal range of motion.        Arms:  Neurological: He is alert and oriented to person, place, and time.   Skin: Skin is warm, dry, intact, rash, vesicular and papular. Capillary refill takes less than 2 seconds. abrasion, burn, bruising, erythema and ecchymosis  Psychiatric: He has a normal mood and affect. His speech is normal and behavior is normal. Judgment and thought content normal. Cognition and memory are normal.   Nursing note and vitals reviewed.       Risks vs benefits of steroid use discussed with pt. Pt understands and would like to proceed with oral steroids. Discussed with patient due to high BP unable to give a steroid injection. Patient verbalized understanding.  Instructed to follow up with dermatologist.  Assessment:       1. Bullous dermatitis        Plan:         Bullous dermatitis  -     methylPREDNISolone (MEDROL DOSEPACK) 4 mg tablet; use as directed  Dispense: 1 Package; Refill: 0      Patient Instructions     Please be aware your blood pressure was slightly elevated today -  Make sure to take your blood pressure medicines, eat a low salt diet and recheck your blood pressure to make sure it is not getting too elevated ( greater than 160/100).   Advised pt to take bp again when well - if still elevated f/u with pcp.     Please return here or go to the Emergency Department for any concerns or worsening of condition.  If you were prescribed antibiotics, please take them to completion.  If you were prescribed a narcotic  medication, do not drive or operate heavy equipment or machinery while taking these medications.  Please follow up with your primary care doctor or specialist as needed.  If you  smoke, please stop smoking.  Nonspecific Dermatitis  Dermatitis is a skin rash caused by something that touches the skin and makes it irritated and inflamed.  Your skin may be red, swollen, dry, and may be cracked. Blisters may form and ooze. The rash will itch.  Dermatitis can form on the face and neck, backs of hands, forearms, genitals, and lower legs. Dermatitis is not passed from person to person.  Talk with your health care provider about what may have caused the rash. Common things that cause skin allergies are metal in jewelry, plants like poison ivy or poison oak, and certain skin care products. You will need to avoid the source of your rash in the future to prevent it from coming back. In some cases, the cause of the dermatitis may not be found.  Treatment is done to relieve itching and prevent the rash from coming back. The rash should go away in a few days to a few weeks.  Home care  The health care provider may prescribe medications to relieve swelling and itching. Follow all instructions when using these medications.  · Avoid anything that heats up your skin, such as hot showers or baths, or direct sunlight. This can make itching worse.  · Stay away from whatever you think caused the rash.  · Bathe in warm, not hot, water. Apply a moisturizing lotion after bathing to prevent dry skin.  · Avoid skin irritants such as wool or silk clothing, grease, oils, harsh soaps, and detergents.  · Apply cold compresses to soothe your sores to help relieve your symptoms. Do this for 30 minutes 3 to 4 times a day. You can make a cold compress by soaking a cloth in cold water. Squeeze out excess water. You can add colloidal oatmeal to the water to help reduce itching. For severe itching in a small area, apply an ice pack wrapped in a thin  towel. Do this for 20 minutes 3 to 4 times a day.  · You can also help relieve large areas of itching by taking a lukewarm bath with colloidal oatmeal added to the water.  · Use hydrocortisone cream for redness and irritation, unless another medicine was prescribed. You can also use benzocaine anesthetic cream or spray.  · Use oral diphenhydramine to help reduce itching. This is an antihistamine you can buy at drug and grocery stores. It can make you sleepy, so use lower doses during the daytime. Or you can use loratadine. This is an antihistamine that will not make you sleepy. Dont use diphenhydramine if you have glaucoma or have trouble urinating because of an enlarged prostate.  · Wash your hands or use an antibacterial gel often to prevent the spread of the rash.  Follow-up care  Follow up with your health care provider. Make an appointment with your health care provider if your symptoms do not get better in the next 1 to 2 weeks.  When to seek medical advice  Call your health care provider right away if any of these occur:  · Spreading of the rash to other parts of your body  · Severe swelling of your face, eyelids, mouth, throat or tongue  · Trouble urinating due to swelling in the genital area  · Fever of 100.4°F (38°C) or higher  · Redness or swelling that gets worse  · Pain that gets worse  · Foul-smelling fluid leaking from the skin  · Yellow-brown crusts on the open blisters  · Joint pain   Date Last Reviewed: 7/23/2014  © 0441-6598 [employer]. [address] [place] [address], [place] [address]. All rights reserved. This information is not intended as a substitute for professional medical care. Always follow your healthcare professional's instructions.

## 2020-01-08 ENCOUNTER — INFUSION (OUTPATIENT)
Dept: INFUSION THERAPY | Facility: HOSPITAL | Age: 75
End: 2020-01-08
Attending: INTERNAL MEDICINE
Payer: MEDICARE

## 2020-01-08 VITALS
SYSTOLIC BLOOD PRESSURE: 122 MMHG | RESPIRATION RATE: 18 BRPM | DIASTOLIC BLOOD PRESSURE: 71 MMHG | OXYGEN SATURATION: 100 % | TEMPERATURE: 98 F | HEART RATE: 89 BPM

## 2020-01-08 DIAGNOSIS — Z85.72 HISTORY OF NON-HODGKIN'S LYMPHOMA: Primary | ICD-10-CM

## 2020-01-08 PROCEDURE — A4216 STERILE WATER/SALINE, 10 ML: HCPCS | Performed by: INTERNAL MEDICINE

## 2020-01-08 PROCEDURE — 63600175 PHARM REV CODE 636 W HCPCS: Performed by: INTERNAL MEDICINE

## 2020-01-08 PROCEDURE — 96523 IRRIG DRUG DELIVERY DEVICE: CPT

## 2020-01-08 PROCEDURE — 25000003 PHARM REV CODE 250: Performed by: INTERNAL MEDICINE

## 2020-01-08 RX ORDER — HEPARIN 100 UNIT/ML
500 SYRINGE INTRAVENOUS
Status: DISCONTINUED | OUTPATIENT
Start: 2020-01-08 | End: 2020-01-08 | Stop reason: HOSPADM

## 2020-01-08 RX ORDER — SODIUM CHLORIDE 0.9 % (FLUSH) 0.9 %
10 SYRINGE (ML) INJECTION
Status: DISCONTINUED | OUTPATIENT
Start: 2020-01-08 | End: 2020-01-08 | Stop reason: HOSPADM

## 2020-01-08 RX ADMIN — HEPARIN 500 UNITS: 100 SYRINGE at 08:01

## 2020-01-08 RX ADMIN — Medication 10 ML: at 08:01

## 2020-01-08 NOTE — PLAN OF CARE
Patient tolerated monthly portacath flush. VSS. No current concerns/complaints. He received discharge instructions and follow up appointments. Verbalized understanding and ambulated unassisted off unit by self. Patient in NAD at time of discharge.

## 2020-02-05 ENCOUNTER — INFUSION (OUTPATIENT)
Dept: INFUSION THERAPY | Facility: HOSPITAL | Age: 75
End: 2020-02-05
Attending: INTERNAL MEDICINE
Payer: MEDICARE

## 2020-02-05 VITALS
TEMPERATURE: 98 F | OXYGEN SATURATION: 98 % | DIASTOLIC BLOOD PRESSURE: 78 MMHG | HEART RATE: 92 BPM | SYSTOLIC BLOOD PRESSURE: 134 MMHG | RESPIRATION RATE: 16 BRPM

## 2020-02-05 DIAGNOSIS — Z85.72 HISTORY OF NON-HODGKIN'S LYMPHOMA: Primary | ICD-10-CM

## 2020-02-05 PROCEDURE — 96523 IRRIG DRUG DELIVERY DEVICE: CPT

## 2020-02-05 PROCEDURE — 63600175 PHARM REV CODE 636 W HCPCS: Performed by: INTERNAL MEDICINE

## 2020-02-05 PROCEDURE — A4216 STERILE WATER/SALINE, 10 ML: HCPCS | Performed by: INTERNAL MEDICINE

## 2020-02-05 PROCEDURE — 25000003 PHARM REV CODE 250: Performed by: INTERNAL MEDICINE

## 2020-02-05 RX ORDER — SODIUM CHLORIDE 0.9 % (FLUSH) 0.9 %
10 SYRINGE (ML) INJECTION
Status: DISCONTINUED | OUTPATIENT
Start: 2020-02-05 | End: 2020-02-05 | Stop reason: HOSPADM

## 2020-02-05 RX ORDER — HEPARIN 100 UNIT/ML
500 SYRINGE INTRAVENOUS
Status: DISCONTINUED | OUTPATIENT
Start: 2020-02-05 | End: 2020-02-05 | Stop reason: HOSPADM

## 2020-02-05 RX ADMIN — HEPARIN 500 UNITS: 100 SYRINGE at 08:02

## 2020-02-05 RX ADMIN — Medication 10 ML: at 08:02

## 2020-02-05 NOTE — PLAN OF CARE
Patient arrived to infusion center for monthly port flush. Tolerated well. VSS. Discharge instructions provided. Patient will return 03/04/2020 for next portacath flush. Patient ambulated unassisted off unit by self, in NAD at time of discharge.

## 2020-03-04 ENCOUNTER — INFUSION (OUTPATIENT)
Dept: INFUSION THERAPY | Facility: HOSPITAL | Age: 75
End: 2020-03-04
Attending: INTERNAL MEDICINE
Payer: MEDICARE

## 2020-03-04 VITALS
SYSTOLIC BLOOD PRESSURE: 130 MMHG | RESPIRATION RATE: 17 BRPM | DIASTOLIC BLOOD PRESSURE: 71 MMHG | OXYGEN SATURATION: 98 % | HEART RATE: 92 BPM | TEMPERATURE: 98 F

## 2020-03-04 DIAGNOSIS — Z85.72 HISTORY OF NON-HODGKIN'S LYMPHOMA: Primary | ICD-10-CM

## 2020-03-04 PROCEDURE — A4216 STERILE WATER/SALINE, 10 ML: HCPCS | Performed by: INTERNAL MEDICINE

## 2020-03-04 PROCEDURE — 96523 IRRIG DRUG DELIVERY DEVICE: CPT

## 2020-03-04 PROCEDURE — 63600175 PHARM REV CODE 636 W HCPCS: Performed by: INTERNAL MEDICINE

## 2020-03-04 PROCEDURE — 25000003 PHARM REV CODE 250: Performed by: INTERNAL MEDICINE

## 2020-03-04 RX ORDER — HEPARIN 100 UNIT/ML
500 SYRINGE INTRAVENOUS
Status: DISCONTINUED | OUTPATIENT
Start: 2020-03-04 | End: 2020-03-04 | Stop reason: HOSPADM

## 2020-03-04 RX ORDER — SODIUM CHLORIDE 0.9 % (FLUSH) 0.9 %
10 SYRINGE (ML) INJECTION
Status: DISCONTINUED | OUTPATIENT
Start: 2020-03-04 | End: 2020-03-04 | Stop reason: HOSPADM

## 2020-03-04 RX ADMIN — HEPARIN 500 UNITS: 100 SYRINGE at 08:03

## 2020-03-04 RX ADMIN — Medication 10 ML: at 08:03

## 2020-03-04 NOTE — PLAN OF CARE
Patient arrived to infusion center for monthly port flush. Tolerated well. VSS. Discharge instructions provided. Patient ambulated unassisted off unit by self, in NAD at time of discharge.

## 2020-05-04 ENCOUNTER — TELEPHONE (OUTPATIENT)
Dept: ADMINISTRATIVE | Facility: CLINIC | Age: 75
End: 2020-05-04

## 2020-05-04 DIAGNOSIS — Z13.9 SCREENING FOR CONDITION: Primary | ICD-10-CM

## 2020-05-04 NOTE — TELEPHONE ENCOUNTER
05/04/2020      Phoned the patient.      Discussed the following with the patient:  In an effort to protect our immunocompromised patients from potential exposure to COVID-19, [facility] will now require all patients receiving an infusion, an injection, and/or radiation therapy to be tested for COVID-19 prior to their appointment.  All patients currently under treatment will be tested immediately, and patients initiating new treatment cycles or with one-time appointments (injections, transfusions, etc.) must be tested within 72 hours of their appointment.      Symptom Patient's Response   Fever YES/NO: no   Cough YES/NO: no   Shortness of breath  YES/NO: no   Difficulty breathing YES/NO: no   GI symptoms such as diarrhea or nausea YES/NO: no   Loss of taste YES/NO: no   Loss of smell YES/NO: no     Other Screening Patient's Response   Has the patient previously undergone COVID-19 testing? YES/NO: no     If yes to the question directly above, what was the result? not applicable   Has the patient been in close contact with someone who has undergone COVID-19 testing? YES/NO: no     If yes to the question directly above, what was the result? not applicable      The patient's 24-hour COVID-19 test was ordered and scheduled.  Reviewed the appointment date, time, and location with the patient.      Advised the patient that he can expect the results to take approximately 24 hours.  Advised the patient that someone will reach out to him regarding his results if he tests positive, as his treatment appointment will be rescheduled if he tests positive for COVID-19.  Also advised the patient that if he does not hear back from our office or if he is told by our office he has tested negative for COVID-19, he can proceed with treatment as originally planned.     Questions were answered to the patient's satisfaction, and the patient verbalized understanding of information and agreement with the plan.       The above was completed in  accordance with instructions and guidelines set forth by [facility].     Signed,        Anjana Stanton     Date:  05/04/2020

## 2020-05-05 ENCOUNTER — LAB VISIT (OUTPATIENT)
Dept: INFUSION THERAPY | Facility: HOSPITAL | Age: 75
End: 2020-05-05
Attending: NURSE PRACTITIONER
Payer: MEDICARE

## 2020-05-05 DIAGNOSIS — Z13.9 SCREENING FOR CONDITION: ICD-10-CM

## 2020-05-05 LAB — SARS-COV-2 RNA RESP QL NAA+PROBE: NOT DETECTED

## 2020-05-05 PROCEDURE — U0002 COVID-19 LAB TEST NON-CDC: HCPCS

## 2020-05-06 ENCOUNTER — INFUSION (OUTPATIENT)
Dept: INFUSION THERAPY | Facility: HOSPITAL | Age: 75
End: 2020-05-06
Attending: INTERNAL MEDICINE
Payer: MEDICARE

## 2020-05-06 VITALS
HEART RATE: 85 BPM | SYSTOLIC BLOOD PRESSURE: 154 MMHG | OXYGEN SATURATION: 97 % | RESPIRATION RATE: 17 BRPM | TEMPERATURE: 98 F | DIASTOLIC BLOOD PRESSURE: 82 MMHG

## 2020-05-06 DIAGNOSIS — Z85.72 HISTORY OF NON-HODGKIN'S LYMPHOMA: Primary | ICD-10-CM

## 2020-05-06 PROCEDURE — 25000003 PHARM REV CODE 250: Performed by: INTERNAL MEDICINE

## 2020-05-06 PROCEDURE — 96523 IRRIG DRUG DELIVERY DEVICE: CPT

## 2020-05-06 PROCEDURE — A4216 STERILE WATER/SALINE, 10 ML: HCPCS | Performed by: INTERNAL MEDICINE

## 2020-05-06 PROCEDURE — 63600175 PHARM REV CODE 636 W HCPCS: Performed by: INTERNAL MEDICINE

## 2020-05-06 RX ORDER — SODIUM CHLORIDE 0.9 % (FLUSH) 0.9 %
10 SYRINGE (ML) INJECTION
Status: DISCONTINUED | OUTPATIENT
Start: 2020-05-06 | End: 2020-05-06 | Stop reason: HOSPADM

## 2020-05-06 RX ORDER — HEPARIN 100 UNIT/ML
500 SYRINGE INTRAVENOUS
Status: DISCONTINUED | OUTPATIENT
Start: 2020-05-06 | End: 2020-05-06 | Stop reason: HOSPADM

## 2020-05-06 RX ADMIN — HEPARIN 500 UNITS: 100 SYRINGE at 08:05

## 2020-05-06 RX ADMIN — Medication 10 ML: at 08:05

## 2020-05-06 NOTE — PLAN OF CARE
Pt arrived to unit. VSS. Pt afebrile. Tolerated port flush. No complaints voiced. Pt has next appt. Pt ambulated off unit. No distress noted.

## 2020-05-26 ENCOUNTER — TELEPHONE (OUTPATIENT)
Dept: HEMATOLOGY/ONCOLOGY | Facility: CLINIC | Age: 75
End: 2020-05-26

## 2020-05-26 DIAGNOSIS — Z13.9 SCREENING FOR CONDITION: Primary | ICD-10-CM

## 2020-05-26 NOTE — TELEPHONE ENCOUNTER
Called patient in regards to chemo/infusion appointment scheduled on 6/3. Informed patient everyone is required to have covid testing done before their appointment. Patient stated the tests hurt and he wants time to think about it. I informed patient if he does not have the test done he cannot have his port flush on 6/3. Patient stated he will think about it and will call me back.

## 2020-06-01 ENCOUNTER — DOCUMENTATION ONLY (OUTPATIENT)
Dept: HEMATOLOGY/ONCOLOGY | Facility: CLINIC | Age: 75
End: 2020-06-01

## 2020-06-01 ENCOUNTER — TELEPHONE (OUTPATIENT)
Dept: HEMATOLOGY/ONCOLOGY | Facility: CLINIC | Age: 75
End: 2020-06-01

## 2020-06-01 NOTE — PROGRESS NOTES
Left a message for the patient to return call to schedule covid screening test prior to infusion appointment.

## 2020-06-01 NOTE — TELEPHONE ENCOUNTER
Patient declined covid screening appointment. Patient stated that he will call infusion department to cancelled appointment on 6/3/2020

## 2020-07-01 ENCOUNTER — INFUSION (OUTPATIENT)
Dept: INFUSION THERAPY | Facility: HOSPITAL | Age: 75
End: 2020-07-01
Attending: INTERNAL MEDICINE
Payer: MEDICARE

## 2020-07-01 VITALS
SYSTOLIC BLOOD PRESSURE: 147 MMHG | RESPIRATION RATE: 17 BRPM | DIASTOLIC BLOOD PRESSURE: 78 MMHG | OXYGEN SATURATION: 98 % | TEMPERATURE: 98 F | HEART RATE: 101 BPM

## 2020-07-01 DIAGNOSIS — Z85.72 HISTORY OF NON-HODGKIN'S LYMPHOMA: Primary | ICD-10-CM

## 2020-07-01 PROCEDURE — 96523 IRRIG DRUG DELIVERY DEVICE: CPT

## 2020-07-01 PROCEDURE — 63600175 PHARM REV CODE 636 W HCPCS: Performed by: INTERNAL MEDICINE

## 2020-07-01 PROCEDURE — A4216 STERILE WATER/SALINE, 10 ML: HCPCS | Performed by: INTERNAL MEDICINE

## 2020-07-01 PROCEDURE — 25000003 PHARM REV CODE 250: Performed by: INTERNAL MEDICINE

## 2020-07-01 RX ORDER — HEPARIN 100 UNIT/ML
500 SYRINGE INTRAVENOUS
Status: DISCONTINUED | OUTPATIENT
Start: 2020-07-01 | End: 2020-07-01 | Stop reason: HOSPADM

## 2020-07-01 RX ORDER — SODIUM CHLORIDE 0.9 % (FLUSH) 0.9 %
10 SYRINGE (ML) INJECTION
Status: DISCONTINUED | OUTPATIENT
Start: 2020-07-01 | End: 2020-07-01 | Stop reason: HOSPADM

## 2020-07-01 RX ADMIN — Medication 10 ML: at 08:07

## 2020-07-01 RX ADMIN — HEPARIN 500 UNITS: 100 SYRINGE at 08:07

## 2020-07-01 NOTE — PLAN OF CARE
Patient arrived to infusion center for monthly port flush. Tolerated well. VSS. Discharge instructions provided. Patient instructed to return 8/5/20. Patient ambulated unassisted off unit. Patient in NAD at time of discharge.

## 2020-08-06 ENCOUNTER — INFUSION (OUTPATIENT)
Dept: INFUSION THERAPY | Facility: HOSPITAL | Age: 75
End: 2020-08-06
Attending: INTERNAL MEDICINE
Payer: MEDICARE

## 2020-08-06 ENCOUNTER — LAB VISIT (OUTPATIENT)
Dept: LAB | Facility: HOSPITAL | Age: 75
End: 2020-08-06
Attending: INTERNAL MEDICINE
Payer: MEDICARE

## 2020-08-06 ENCOUNTER — OFFICE VISIT (OUTPATIENT)
Dept: HEMATOLOGY/ONCOLOGY | Facility: CLINIC | Age: 75
End: 2020-08-06
Payer: MEDICARE

## 2020-08-06 VITALS
HEIGHT: 71 IN | HEART RATE: 106 BPM | OXYGEN SATURATION: 99 % | BODY MASS INDEX: 26.88 KG/M2 | WEIGHT: 192 LBS | SYSTOLIC BLOOD PRESSURE: 171 MMHG | DIASTOLIC BLOOD PRESSURE: 88 MMHG | TEMPERATURE: 97 F

## 2020-08-06 DIAGNOSIS — Z85.72 HISTORY OF NON-HODGKIN'S LYMPHOMA: ICD-10-CM

## 2020-08-06 DIAGNOSIS — D64.9 ANEMIA, UNSPECIFIED TYPE: ICD-10-CM

## 2020-08-06 DIAGNOSIS — Z85.72 HISTORY OF NON-HODGKIN'S LYMPHOMA: Primary | ICD-10-CM

## 2020-08-06 LAB
ALBUMIN SERPL BCP-MCNC: 3.8 G/DL (ref 3.5–5.2)
ALP SERPL-CCNC: 93 U/L (ref 55–135)
ALT SERPL W/O P-5'-P-CCNC: 24 U/L (ref 10–44)
ANION GAP SERPL CALC-SCNC: 10 MMOL/L (ref 8–16)
AST SERPL-CCNC: 23 U/L (ref 10–40)
BASOPHILS # BLD AUTO: 0.01 K/UL (ref 0–0.2)
BASOPHILS NFR BLD: 0.2 % (ref 0–1.9)
BILIRUB SERPL-MCNC: 0.4 MG/DL (ref 0.1–1)
BUN SERPL-MCNC: 19 MG/DL (ref 8–23)
CALCIUM SERPL-MCNC: 8.9 MG/DL (ref 8.7–10.5)
CHLORIDE SERPL-SCNC: 105 MMOL/L (ref 95–110)
CO2 SERPL-SCNC: 27 MMOL/L (ref 23–29)
CREAT SERPL-MCNC: 1.1 MG/DL (ref 0.5–1.4)
DIFFERENTIAL METHOD: ABNORMAL
EOSINOPHIL # BLD AUTO: 0.1 K/UL (ref 0–0.5)
EOSINOPHIL NFR BLD: 3.2 % (ref 0–8)
ERYTHROCYTE [DISTWIDTH] IN BLOOD BY AUTOMATED COUNT: 14.7 % (ref 11.5–14.5)
EST. GFR  (AFRICAN AMERICAN): >60 ML/MIN/1.73 M^2
EST. GFR  (NON AFRICAN AMERICAN): >60 ML/MIN/1.73 M^2
GLUCOSE SERPL-MCNC: 162 MG/DL (ref 70–110)
HCT VFR BLD AUTO: 39.2 % (ref 40–54)
HGB BLD-MCNC: 12.4 G/DL (ref 14–18)
IMM GRANULOCYTES # BLD AUTO: 0.01 K/UL (ref 0–0.04)
IMM GRANULOCYTES NFR BLD AUTO: 0.2 % (ref 0–0.5)
LDH SERPL L TO P-CCNC: 297 U/L (ref 110–260)
LYMPHOCYTES # BLD AUTO: 1.4 K/UL (ref 1–4.8)
LYMPHOCYTES NFR BLD: 30.6 % (ref 18–48)
MCH RBC QN AUTO: 27.6 PG (ref 27–31)
MCHC RBC AUTO-ENTMCNC: 31.6 G/DL (ref 32–36)
MCV RBC AUTO: 87 FL (ref 82–98)
MONOCYTES # BLD AUTO: 0.4 K/UL (ref 0.3–1)
MONOCYTES NFR BLD: 9.5 % (ref 4–15)
NEUTROPHILS # BLD AUTO: 2.5 K/UL (ref 1.8–7.7)
NEUTROPHILS NFR BLD: 56.3 % (ref 38–73)
NRBC BLD-RTO: 0 /100 WBC
PLATELET # BLD AUTO: 295 K/UL (ref 150–350)
PMV BLD AUTO: 9.4 FL (ref 9.2–12.9)
POTASSIUM SERPL-SCNC: 3.6 MMOL/L (ref 3.5–5.1)
PROT SERPL-MCNC: 6.8 G/DL (ref 6–8.4)
RBC # BLD AUTO: 4.49 M/UL (ref 4.6–6.2)
SODIUM SERPL-SCNC: 142 MMOL/L (ref 136–145)
WBC # BLD AUTO: 4.41 K/UL (ref 3.9–12.7)

## 2020-08-06 PROCEDURE — 25000003 PHARM REV CODE 250: Performed by: INTERNAL MEDICINE

## 2020-08-06 PROCEDURE — 36415 COLL VENOUS BLD VENIPUNCTURE: CPT

## 2020-08-06 PROCEDURE — 1159F PR MEDICATION LIST DOCUMENTED IN MEDICAL RECORD: ICD-10-PCS | Mod: S$GLB,,, | Performed by: INTERNAL MEDICINE

## 2020-08-06 PROCEDURE — 1101F PR PT FALLS ASSESS DOC 0-1 FALLS W/OUT INJ PAST YR: ICD-10-PCS | Mod: CPTII,S$GLB,, | Performed by: INTERNAL MEDICINE

## 2020-08-06 PROCEDURE — 85025 COMPLETE CBC W/AUTO DIFF WBC: CPT

## 2020-08-06 PROCEDURE — 3008F BODY MASS INDEX DOCD: CPT | Mod: CPTII,S$GLB,, | Performed by: INTERNAL MEDICINE

## 2020-08-06 PROCEDURE — 3077F PR MOST RECENT SYSTOLIC BLOOD PRESSURE >= 140 MM HG: ICD-10-PCS | Mod: CPTII,S$GLB,, | Performed by: INTERNAL MEDICINE

## 2020-08-06 PROCEDURE — 3079F DIAST BP 80-89 MM HG: CPT | Mod: CPTII,S$GLB,, | Performed by: INTERNAL MEDICINE

## 2020-08-06 PROCEDURE — 99214 PR OFFICE/OUTPT VISIT, EST, LEVL IV, 30-39 MIN: ICD-10-PCS | Mod: S$GLB,,, | Performed by: INTERNAL MEDICINE

## 2020-08-06 PROCEDURE — 83615 LACTATE (LD) (LDH) ENZYME: CPT

## 2020-08-06 PROCEDURE — 1159F MED LIST DOCD IN RCRD: CPT | Mod: S$GLB,,, | Performed by: INTERNAL MEDICINE

## 2020-08-06 PROCEDURE — 3008F PR BODY MASS INDEX (BMI) DOCUMENTED: ICD-10-PCS | Mod: CPTII,S$GLB,, | Performed by: INTERNAL MEDICINE

## 2020-08-06 PROCEDURE — 3077F SYST BP >= 140 MM HG: CPT | Mod: CPTII,S$GLB,, | Performed by: INTERNAL MEDICINE

## 2020-08-06 PROCEDURE — 1101F PT FALLS ASSESS-DOCD LE1/YR: CPT | Mod: CPTII,S$GLB,, | Performed by: INTERNAL MEDICINE

## 2020-08-06 PROCEDURE — A4216 STERILE WATER/SALINE, 10 ML: HCPCS | Performed by: INTERNAL MEDICINE

## 2020-08-06 PROCEDURE — 1126F PR PAIN SEVERITY QUANTIFIED, NO PAIN PRESENT: ICD-10-PCS | Mod: S$GLB,,, | Performed by: INTERNAL MEDICINE

## 2020-08-06 PROCEDURE — 99214 OFFICE O/P EST MOD 30 MIN: CPT | Mod: S$GLB,,, | Performed by: INTERNAL MEDICINE

## 2020-08-06 PROCEDURE — 99999 PR PBB SHADOW E&M-EST. PATIENT-LVL III: ICD-10-PCS | Mod: PBBFAC,,, | Performed by: INTERNAL MEDICINE

## 2020-08-06 PROCEDURE — 3079F PR MOST RECENT DIASTOLIC BLOOD PRESSURE 80-89 MM HG: ICD-10-PCS | Mod: CPTII,S$GLB,, | Performed by: INTERNAL MEDICINE

## 2020-08-06 PROCEDURE — 80053 COMPREHEN METABOLIC PANEL: CPT

## 2020-08-06 PROCEDURE — 1126F AMNT PAIN NOTED NONE PRSNT: CPT | Mod: S$GLB,,, | Performed by: INTERNAL MEDICINE

## 2020-08-06 PROCEDURE — 99999 PR PBB SHADOW E&M-EST. PATIENT-LVL III: CPT | Mod: PBBFAC,,, | Performed by: INTERNAL MEDICINE

## 2020-08-06 PROCEDURE — 63600175 PHARM REV CODE 636 W HCPCS: Performed by: INTERNAL MEDICINE

## 2020-08-06 RX ORDER — SODIUM CHLORIDE 0.9 % (FLUSH) 0.9 %
10 SYRINGE (ML) INJECTION
Status: DISCONTINUED | OUTPATIENT
Start: 2020-08-06 | End: 2020-08-06 | Stop reason: HOSPADM

## 2020-08-06 RX ORDER — HEPARIN 100 UNIT/ML
500 SYRINGE INTRAVENOUS
Status: DISCONTINUED | OUTPATIENT
Start: 2020-08-06 | End: 2020-08-06 | Stop reason: HOSPADM

## 2020-08-06 RX ADMIN — HEPARIN 500 UNITS: 100 SYRINGE at 10:08

## 2020-08-06 RX ADMIN — Medication 10 ML: at 10:08

## 2020-08-06 NOTE — PROGRESS NOTES
Subjective:       Patient ID: Long Najera is a 74 y.o. male.    Chief Complaint: Follow-up (history of non-Hodgkin's lymphoma)  LOST TO FOLLOW-UP   Last follow-up 2018    Mr. Najera comes to the clinic for f/u for hx of  NHL.  Hodgkins lymphoma stage II > 22 years ago treated with mantle radiation at [facility]   He was diagnosed with stage IV large cell lymphoma, though the subtype was unclear, and he completed 6 cycles of R-CHOP therapy to 9/2013      Pt previously followed by Dr. Harris.   Last f/u with Dr. Harris 2014.  Pt also followed by Dr. Alvarez      He had been followed by [facility]ER[facility] in past for blistering lesions and  was diagnosed with bullous pemphigoid and treated with bactroban and course of steroid therapy     Lost to follow-up     He is doing well  Appetite and weight stable  No fevers or night sweats  No SOB/CP/cough  No recent infections      Colonoscopy 2-3 yrs ago at [facility] pathologically benign    He had been followed by [facility]ERM in past for blistering lesions and  was diagnosed with bullous pemphigoid and treated with bactroban and course of steroid therapy       PET/CT 1/22/2016 Findings consistent with interval significant improvement in the neoplastic disease within the chest indicated by disappearance of a hypermetabolic right subpectoral lymph node and shrinkage/decreased metabolism of bilateral axillary lymph nodes.    PET/CT 1/11/2018   No evidence of residual or recurrent active neoplasm.    Lymphoma history:  His past medical history is notable for Hodgkins lymphoma stage II 21 years ago treated with mantle radiation at [facility] where he worked at the time.  He was then without symptoms until 2/2013 when he noted the onset of a right axillary node which progressively increased accompanied by the onset of fevers, weight loss and drenching night sweats.  He presented for evaluation in 4/2013 and underwent right axillary lymph node core biopsy 3/7/13 revealing suspicious  but nondiagnostic findings of scattered large atypical B lymphocytes that expressed CD20, pax-5 but not CD10, CD15 or CD30. Subsequent complete lymph node biopsy 4/15/13 was reviewed at [facility] finding showing a diffuse scatter involvement of large cells in a background of granulomatous and inflamed tissue. The large cells were CD20 negative but they did have uniform expression of other B markers including Oct-2 and CD45. The absence of CD30, MUM-1 and EBV (by in-situ hybridization) makes Hodgkins less likely. 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  Bone marrow 5/1/13 cellularity was 70% with focal involvement of large cell lymphoma (10-16% overall) which expressed CD20, pax-5, CD45 and Ki-67 with normal cytogenetics and FISH for abnormalities of bcl6, myc, bcl2 and t(11;14) were negative.      Hence, he was diagnosed with stage IV large cell lymphoma, though the subtype was unclear, and he completed 6 cycles of R-CHOP therapy  9/2013.  Since completing his therapy he has been feeling well and progressively stronger.  He has been without sweats, weight loss or fever since early in his therapy last year.  Some relative increase in dyspnea since his therapy last year but stable since then and he feels as if it's getting somewhat better recently (in line with his resolving anemia).      PET-CT 8/30/13 just after finishing therapy showed the largest right axillary node now measures 3.0 x 2.8 cm compared to 3.5 x 3.2 cm previously and has a max SUV of 2.9 compared to 4.0 previously. The hypermetabolic tumor volume of this node is 21.1 cc.     PET-CT 4/25/14 showed a similar appearing soft tissue focus within the right axilla stable in size from prior examination (in January) with slight decrease in hypermetabolic activity demonstrating a Max SUV of 2.1 on today's examination (previously 2.5). However, there is evidence of recurrent disease with  "interval increase in hypermetabolic activity involving a smaller right retropectoral lymph node demonstrating a max SUV of 2.8 (previously 2.3). Additionally, there is now contralateral disease within the left axilla evidenced by a hypermetabolic axillary lymph node demonstrating a max SUV of 3.0. No other areas of focal, abnormally increased FDG activity identified outside of these aforementioned regions. There is otherwise physiologic uptake within the periodontal regions, with voiding glandular structures of the head and neck, bilateral renal collecting systems, and bowel. There is a focus of hypermetabolic activity posterior to the proximal humerus representing muscle activity.              Review of Systems   Constitutional: Negative for activity change, appetite change, diaphoresis, fever and unexpected weight change.   HENT: Negative for congestion and mouth sores.    Eyes: Negative.    Respiratory: Negative for cough and shortness of breath.    Cardiovascular: Negative for chest pain and leg swelling.   Gastrointestinal: Negative for abdominal pain, blood in stool, constipation, diarrhea and nausea.   Genitourinary: Negative for dysuria and hematuria.   Musculoskeletal: Negative for arthralgias and back pain.   Skin: Negative for rash.   Neurological: Negative for weakness and light-headedness.       Objective:     ECOG 1  Vitals:    08/06/20 0951   BP: (!) 171/88   BP Location: Left arm   Patient Position: Sitting   BP Method: Medium (Automatic)   Pulse: 106   Temp: 97.1 °F (36.2 °C)   TempSrc: Oral   SpO2: 99%   Weight: 87.1 kg (192 lb 0.3 oz)   Height: 5' 11" (1.803 m)       Physical Exam   Constitutional: He is oriented to person, place, and time. He appears well-developed and well-nourished.   HENT:   Head: Normocephalic.   Mouth/Throat: Oropharynx is clear and moist. No oropharyngeal exudate.   Eyes: No scleral icterus.   Neck: Normal range of motion. Neck supple. No thyromegaly present. "   Cardiovascular: Normal rate and regular rhythm.   Murmur heard.  Pulmonary/Chest: Effort normal and breath sounds normal. He has no wheezes. He has no rales.   Abdominal: Soft. Bowel sounds are normal. He exhibits no distension. There is no hepatosplenomegaly. There is no abdominal tenderness.   Musculoskeletal: Normal range of motion.         General: No edema.   Lymphadenopathy:     He has no cervical adenopathy.     He has no axillary adenopathy.        Right: No supraclavicular adenopathy present.        Left: No supraclavicular adenopathy present.   Neurological: He is alert and oriented to person, place, and time. No cranial nerve deficit.   Skin: No erythema.   Psychiatric: He has a normal mood and affect.       Lab Results   Component Value Date    WBC 4.41 08/06/2020    HGB 12.4 (L) 08/06/2020    HCT 39.2 (L) 08/06/2020    MCV 87 08/06/2020     08/06/2020           PET/CT 1/22/2016 Findings consistent with interval significant improvement in the neoplastic disease within the chest indicated by disappearance of a hypermetabolic right subpectoral lymph node and shrinkage/decreased metabolism of bilateral axillary lymph nodes.      Results for LOUISE LONDON (MRN [id number]) as of 1/27/2017 09:17   Ref. Range 1/24/2017 08:46   TSH Latest Ref Range: 0.400 - 4.000 uIU/mL 3.278         PET/CT 1/11/2018   No evidence of residual or recurrent active neoplasm. The right prepectoral mass demonstrates no activity whatsoever.      Results for LOUISE LONDON (MRN [id number]) as of 8/9/2020 14:29   Ref. Range 11/21/2018 14:15 8/6/2020 09:06   LD Latest Ref Range: 110 - 260 U/L 312 (H) 297 (H)       Assessment:       1. History of non-Hodgkin's lymphoma    2. Anemia, unspecified type        Plan:    1-2 Pt  clinically stable    Hx of Hodgkin's lymphoma stage II >20 years ago treated with mantle radiation at [facility]   He has history of stage IV large cell lymphoma, subtype was unclear with some  aggressive features and he completed 6 cycles of R-CHOP therapy  9/2013.  PETCT 2014 -axillary mass significantly improved: no has some residual disease in the area but more concerning is the small node on the other side SUV values are still very very low to be suggestive (3.0)    Pt seen by Dr. Alvarez and determined He has DLBCL  with some unusual features and aggressive features Axillary mass significantly improved: no has some residual disease in the area but more concerning is the small node on the other sideSUV values are still very very low to be suggestive (3.0) max After his 6 cycles of therapy his scans were never completely negative and he has had low level activity, mostly at his right axilla, but recently at the left axilla and in the retroperitoneal area.      PET findings 1/2016   Findings consistent with interval significant improvement in the neoplastic disease within the chest indicated by disappearance of a hypermetabolic right subpectoral lymph node and shrinkage/decreased metabolism of bilateral axillary lymph nodes.     PET/CT 1/11/2018  No evidence of residual or recurrent active neoplasm.  IT iis determined,he will be followed without any more scans unless his symptoms, blood counts or lymph node exam dictate reevaluation.   If/when he has a clear relapse we will need a new biopsy to define his pathology    CBC reveals borderline anemia, otherwise nl  If he develops sig  leukopenia or sig anemia, plan marrow  Remains Asymptomatic  No sig findings on PE  Cont observation     CBC, CMP, LDH , B-2.microglobulin prior to f/u  6mos    Pt knows to f/u sooner if problems should arise in the interim

## 2020-08-06 NOTE — PLAN OF CARE
Pt arrived to unit. No complaints voiced. Tolerated port flush. Pt to see Dr. Cao. Pt ambulated off unit. No distress noted.

## 2020-09-03 ENCOUNTER — INFUSION (OUTPATIENT)
Dept: INFUSION THERAPY | Facility: HOSPITAL | Age: 75
End: 2020-09-03
Attending: INTERNAL MEDICINE
Payer: MEDICARE

## 2020-09-03 VITALS
DIASTOLIC BLOOD PRESSURE: 72 MMHG | TEMPERATURE: 98 F | SYSTOLIC BLOOD PRESSURE: 134 MMHG | HEART RATE: 92 BPM | OXYGEN SATURATION: 98 % | RESPIRATION RATE: 17 BRPM

## 2020-09-03 DIAGNOSIS — Z85.72 HISTORY OF NON-HODGKIN'S LYMPHOMA: Primary | ICD-10-CM

## 2020-09-03 PROCEDURE — 25000003 PHARM REV CODE 250: Performed by: INTERNAL MEDICINE

## 2020-09-03 PROCEDURE — A4216 STERILE WATER/SALINE, 10 ML: HCPCS | Performed by: INTERNAL MEDICINE

## 2020-09-03 PROCEDURE — 63600175 PHARM REV CODE 636 W HCPCS: Performed by: INTERNAL MEDICINE

## 2020-09-03 PROCEDURE — 96523 IRRIG DRUG DELIVERY DEVICE: CPT

## 2020-09-03 RX ORDER — HEPARIN 100 UNIT/ML
500 SYRINGE INTRAVENOUS
Status: DISCONTINUED | OUTPATIENT
Start: 2020-09-03 | End: 2020-09-03 | Stop reason: HOSPADM

## 2020-09-03 RX ORDER — SODIUM CHLORIDE 0.9 % (FLUSH) 0.9 %
10 SYRINGE (ML) INJECTION
Status: DISCONTINUED | OUTPATIENT
Start: 2020-09-03 | End: 2020-09-03 | Stop reason: HOSPADM

## 2020-09-03 RX ADMIN — HEPARIN 500 UNITS: 100 SYRINGE at 08:09

## 2020-09-03 RX ADMIN — Medication 10 ML: at 08:09

## 2020-10-15 ENCOUNTER — INFUSION (OUTPATIENT)
Dept: INFUSION THERAPY | Facility: HOSPITAL | Age: 75
End: 2020-10-15
Attending: INTERNAL MEDICINE
Payer: MEDICARE

## 2020-10-15 VITALS
DIASTOLIC BLOOD PRESSURE: 68 MMHG | TEMPERATURE: 98 F | HEART RATE: 99 BPM | RESPIRATION RATE: 18 BRPM | SYSTOLIC BLOOD PRESSURE: 133 MMHG | OXYGEN SATURATION: 98 %

## 2020-10-15 DIAGNOSIS — Z85.72 HISTORY OF NON-HODGKIN'S LYMPHOMA: Primary | ICD-10-CM

## 2020-10-15 PROCEDURE — 96523 IRRIG DRUG DELIVERY DEVICE: CPT

## 2020-10-15 PROCEDURE — 25000003 PHARM REV CODE 250: Performed by: INTERNAL MEDICINE

## 2020-10-15 PROCEDURE — 63600175 PHARM REV CODE 636 W HCPCS: Performed by: INTERNAL MEDICINE

## 2020-10-15 PROCEDURE — A4216 STERILE WATER/SALINE, 10 ML: HCPCS | Performed by: INTERNAL MEDICINE

## 2020-10-15 RX ORDER — HEPARIN 100 UNIT/ML
500 SYRINGE INTRAVENOUS
Status: DISCONTINUED | OUTPATIENT
Start: 2020-10-15 | End: 2020-10-15 | Stop reason: HOSPADM

## 2020-10-15 RX ORDER — SODIUM CHLORIDE 0.9 % (FLUSH) 0.9 %
10 SYRINGE (ML) INJECTION
Status: DISCONTINUED | OUTPATIENT
Start: 2020-10-15 | End: 2020-10-15 | Stop reason: HOSPADM

## 2020-10-15 RX ADMIN — Medication 10 ML: at 08:10

## 2020-10-15 RX ADMIN — HEPARIN 500 UNITS: 100 SYRINGE at 08:10

## 2020-10-15 NOTE — PLAN OF CARE
Patient arrived to unit for Port flush.No new or worsening symptoms to report. Pt states he will be seeing Dr. Cao and they are going to discuss possibly taking out ort. Plan of care reviewed, patient agreeable to plan. Patient tolerated port flush well. VSS. Discharge instructions reviewed, patient instructed to return Nov 12. Patient ambulated off unassisted by self. Patient in NAD at time of discharge.

## 2020-11-12 ENCOUNTER — INFUSION (OUTPATIENT)
Dept: INFUSION THERAPY | Facility: HOSPITAL | Age: 75
End: 2020-11-12
Attending: INTERNAL MEDICINE
Payer: MEDICARE

## 2020-11-12 VITALS
RESPIRATION RATE: 17 BRPM | TEMPERATURE: 98 F | HEART RATE: 97 BPM | DIASTOLIC BLOOD PRESSURE: 71 MMHG | SYSTOLIC BLOOD PRESSURE: 107 MMHG | OXYGEN SATURATION: 97 %

## 2020-11-12 DIAGNOSIS — Z85.72 HISTORY OF NON-HODGKIN'S LYMPHOMA: Primary | ICD-10-CM

## 2020-11-12 PROCEDURE — 25000003 PHARM REV CODE 250: Performed by: INTERNAL MEDICINE

## 2020-11-12 PROCEDURE — 96523 IRRIG DRUG DELIVERY DEVICE: CPT

## 2020-11-12 PROCEDURE — 63600175 PHARM REV CODE 636 W HCPCS: Performed by: INTERNAL MEDICINE

## 2020-11-12 PROCEDURE — A4216 STERILE WATER/SALINE, 10 ML: HCPCS | Performed by: INTERNAL MEDICINE

## 2020-11-12 RX ORDER — SODIUM CHLORIDE 0.9 % (FLUSH) 0.9 %
10 SYRINGE (ML) INJECTION
Status: DISCONTINUED | OUTPATIENT
Start: 2020-11-12 | End: 2020-11-12 | Stop reason: HOSPADM

## 2020-11-12 RX ORDER — HEPARIN 100 UNIT/ML
500 SYRINGE INTRAVENOUS
Status: DISCONTINUED | OUTPATIENT
Start: 2020-11-12 | End: 2020-11-12 | Stop reason: HOSPADM

## 2020-11-12 RX ADMIN — Medication 10 ML: at 08:11

## 2020-11-12 RX ADMIN — HEPARIN 500 UNITS: 100 SYRINGE at 08:11

## 2020-12-03 ENCOUNTER — PATIENT MESSAGE (OUTPATIENT)
Dept: HEMATOLOGY/ONCOLOGY | Facility: CLINIC | Age: 75
End: 2020-12-03

## 2020-12-10 ENCOUNTER — INFUSION (OUTPATIENT)
Dept: INFUSION THERAPY | Facility: HOSPITAL | Age: 75
End: 2020-12-10
Attending: INTERNAL MEDICINE
Payer: MEDICARE

## 2020-12-10 VITALS
OXYGEN SATURATION: 100 % | HEART RATE: 102 BPM | DIASTOLIC BLOOD PRESSURE: 60 MMHG | RESPIRATION RATE: 18 BRPM | TEMPERATURE: 99 F | SYSTOLIC BLOOD PRESSURE: 132 MMHG

## 2020-12-10 DIAGNOSIS — Z85.72 HISTORY OF NON-HODGKIN'S LYMPHOMA: Primary | ICD-10-CM

## 2020-12-10 PROCEDURE — 63600175 PHARM REV CODE 636 W HCPCS: Performed by: INTERNAL MEDICINE

## 2020-12-10 PROCEDURE — 96523 IRRIG DRUG DELIVERY DEVICE: CPT

## 2020-12-10 PROCEDURE — A4216 STERILE WATER/SALINE, 10 ML: HCPCS | Performed by: INTERNAL MEDICINE

## 2020-12-10 PROCEDURE — 25000003 PHARM REV CODE 250: Performed by: INTERNAL MEDICINE

## 2020-12-10 RX ORDER — SODIUM CHLORIDE 0.9 % (FLUSH) 0.9 %
10 SYRINGE (ML) INJECTION
Status: DISCONTINUED | OUTPATIENT
Start: 2020-12-10 | End: 2020-12-10 | Stop reason: HOSPADM

## 2020-12-10 RX ORDER — HEPARIN 100 UNIT/ML
500 SYRINGE INTRAVENOUS
Status: DISCONTINUED | OUTPATIENT
Start: 2020-12-10 | End: 2020-12-10 | Stop reason: HOSPADM

## 2020-12-10 RX ADMIN — Medication 10 ML: at 08:12

## 2020-12-10 RX ADMIN — HEPARIN 500 UNITS: 100 SYRINGE at 08:12

## 2020-12-26 ENCOUNTER — OFFICE VISIT (OUTPATIENT)
Dept: URGENT CARE | Facility: CLINIC | Age: 75
End: 2020-12-26
Payer: MEDICARE

## 2020-12-26 VITALS
OXYGEN SATURATION: 99 % | DIASTOLIC BLOOD PRESSURE: 78 MMHG | HEART RATE: 97 BPM | SYSTOLIC BLOOD PRESSURE: 126 MMHG | TEMPERATURE: 98 F

## 2020-12-26 DIAGNOSIS — K04.7 DENTAL INFECTION: Primary | ICD-10-CM

## 2020-12-26 PROCEDURE — 99213 OFFICE O/P EST LOW 20 MIN: CPT | Mod: S$GLB,,, | Performed by: NURSE PRACTITIONER

## 2020-12-26 PROCEDURE — 99213 PR OFFICE/OUTPT VISIT, EST, LEVL III, 20-29 MIN: ICD-10-PCS | Mod: S$GLB,,, | Performed by: NURSE PRACTITIONER

## 2020-12-26 PROCEDURE — 1157F ADVNC CARE PLAN IN RCRD: CPT | Mod: S$GLB,,, | Performed by: NURSE PRACTITIONER

## 2020-12-26 PROCEDURE — 1157F PR ADVANCE CARE PLAN OR EQUIV PRESENT IN MEDICAL RECORD: ICD-10-PCS | Mod: S$GLB,,, | Performed by: NURSE PRACTITIONER

## 2020-12-26 RX ORDER — AMOXICILLIN AND CLAVULANATE POTASSIUM 875; 125 MG/1; MG/1
1 TABLET, FILM COATED ORAL 2 TIMES DAILY
Qty: 20 TABLET | Refills: 0 | Status: SHIPPED | OUTPATIENT
Start: 2020-12-26 | End: 2021-01-05

## 2020-12-26 NOTE — PATIENT INSTRUCTIONS
"Recommend culturelle or align.  Follow-up with dentist.  Dental Abscess    An abscess is a pocket of pus at the tip of a tooth root in your jaw bone. It is caused by an infection at the root of the tooth. It can cause pain and swelling of the gum, cheek, or jaw. Pain may spread from the tooth to your ear or the area of your jaw on the same side. If the abscess isnt treated, it appears as a bubble or swelling on the gum near the tooth. The pressure that builds in this swelling is the source of the pain. More serious infections cause your face to swell.  An abscess can be caused by a crack in the tooth, a cavity, a gum infection, or a combination of these. Once the pulp of the tooth is exposed, bacteria can spread down the roots to the tip. If the bacteria are not stopped, they can damage the bone and soft tissue, and an abscess can form.  Home care  Follow these guidelines when caring for yourself at home:  · Avoid hot and cold foods and drinks. Your tooth may be sensitive to changes in temperature. Dont chew on the side of the infected tooth.  · If your tooth is chipped or cracked, or if there is a large open cavity, put oil of cloves directly on the tooth to relieve pain. You can buy oil of cloves at drugstores. Some pharmacies carry an over-the-counter "toothache kit." This contains a paste that you can put on the exposed tooth to make it less sensitive.  · Put a cold pack on your jaw over the sore area to help reduce pain.  · You may use over-the-counter medicine to ease pain, unless another medicine was prescribed. If you have chronic liver or kidney disease, talk with your healthcare provider before using acetaminophen or ibuprofen. Also talk with your provider if youve had a stomach ulcer or GI bleeding.  · An antibiotic will be prescribed. Take it until finished, even if you are feeling better after a few days.  Follow-up care  Follow up with your dentist or an oral surgeon, or as advised. Once an " infection occurs in a tooth, it will continue to be a problem until the infection is drained. This is done through surgery or a root canal. Or you may need to have your tooth pulled.  Call 911  Call 911 if any of these occur:  · Unusual drowsiness  · Headache or stiff neck  · Weakness or fainting  · Difficulty swallowing, breathing, or opening your mouth  · Swollen eyelids  When to seek medical advice  Call your healthcare provider right away if any of these occur:  · Your face becomes more swollen or red  · Pain gets worse or spreads to your neck  · Fever of 100.4º F (38.0º C) or higher, or as directed by your healthcare provider  · Pus drains from the tooth  Date Last Reviewed: 10/1/2016  © 0839-0933 [employer]. [address] [place] [address], [place] [address]. All rights reserved. This information is not intended as a substitute for professional medical care. Always follow your healthcare professional's instructions.

## 2020-12-26 NOTE — PROGRESS NOTES
Subjective:       Patient ID: Long Najera is a 75 y.o. male.    Vitals:  temperature is 97.6 °F (36.4 °C). His blood pressure is 126/78 and his pulse is 97. His oxygen saturation is 99%.     Chief Complaint: Facial Swelling    Pt states that he woke up this morning with left side facial swelling . Pt states that he is not in any pain   Provider note begins below  Patient states is on doxycycline and lisinopril which she has been taking without any issues.  He denies throat swelling or trouble breathing.  He denies any new foods or medications.  States swelling has improved since this morning.  Reports mild dental pain on the left side.  He has a dental appointment on Wednesday.    Facial Pain  This is a new problem. The current episode started today. The problem occurs constantly. The problem has been unchanged. Pertinent negatives include no arthralgias, chest pain, chills, congestion, coughing, fatigue, fever, headaches, joint swelling, myalgias, nausea, rash, sore throat, vertigo or vomiting. He has tried nothing for the symptoms.       Constitution: Negative for chills, fatigue and fever.   HENT: Positive for facial swelling. Negative for congestion and sore throat.    Neck: Negative for painful lymph nodes.   Cardiovascular: Negative for chest pain and leg swelling.   Eyes: Negative for double vision and blurred vision.   Respiratory: Negative for cough and shortness of breath.    Gastrointestinal: Negative for nausea, vomiting and diarrhea.   Genitourinary: Negative for dysuria, frequency and urgency.   Musculoskeletal: Negative for joint pain, joint swelling, muscle cramps and muscle ache.   Skin: Negative for color change, pale and rash.   Allergic/Immunologic: Negative for seasonal allergies.   Neurological: Negative for dizziness, history of vertigo, light-headedness, passing out and headaches.   Hematologic/Lymphatic: Negative for swollen lymph nodes, easy bruising/bleeding and history of blood  clots. Does not bruise/bleed easily.   Psychiatric/Behavioral: Negative for nervous/anxious, sleep disturbance and depression. The patient is not nervous/anxious.        Objective:      Physical Exam   Constitutional: He is oriented to person, place, and time.   HENT:   Head: Normocephalic and atraumatic.   Mouth/Throat: He has dentures. Dental abscesses (red inlfammed gums to upper left side) present.       Cardiovascular: Bradycardia present.   Pulmonary/Chest: Effort normal. No respiratory distress.   Abdominal: Normal appearance.   Neurological: He is alert and oriented to person, place, and time.   Skin: Skin is dry. Psychiatric: His behavior is normal. Mood normal.         Assessment:       1. Dental infection        Plan:         Patient Instructions   Recommend culturelle or align.  Follow-up with dentist.  Dental Abscess    An abscess is a pocket of pus at the tip of a tooth root in your jaw bone. It is caused by an infection at the root of the tooth. It can cause pain and swelling of the gum, cheek, or jaw. Pain may spread from the tooth to your ear or the area of your jaw on the same side. If the abscess isnt treated, it appears as a bubble or swelling on the gum near the tooth. The pressure that builds in this swelling is the source of the pain. More serious infections cause your face to swell.  An abscess can be caused by a crack in the tooth, a cavity, a gum infection, or a combination of these. Once the pulp of the tooth is exposed, bacteria can spread down the roots to the tip. If the bacteria are not stopped, they can damage the bone and soft tissue, and an abscess can form.  Home care  Follow these guidelines when caring for yourself at home:  · Avoid hot and cold foods and drinks. Your tooth may be sensitive to changes in temperature. Dont chew on the side of the infected tooth.  · If your tooth is chipped or cracked, or if there is a large open cavity, put oil of cloves directly on the tooth to  "relieve pain. You can buy oil of cloves at drugstores. Some pharmacies carry an over-the-counter "toothache kit." This contains a paste that you can put on the exposed tooth to make it less sensitive.  · Put a cold pack on your jaw over the sore area to help reduce pain.  · You may use over-the-counter medicine to ease pain, unless another medicine was prescribed. If you have chronic liver or kidney disease, talk with your healthcare provider before using acetaminophen or ibuprofen. Also talk with your provider if youve had a stomach ulcer or GI bleeding.  · An antibiotic will be prescribed. Take it until finished, even if you are feeling better after a few days.  Follow-up care  Follow up with your dentist or an oral surgeon, or as advised. Once an infection occurs in a tooth, it will continue to be a problem until the infection is drained. This is done through surgery or a root canal. Or you may need to have your tooth pulled.  Call 911  Call 911 if any of these occur:  · Unusual drowsiness  · Headache or stiff neck  · Weakness or fainting  · Difficulty swallowing, breathing, or opening your mouth  · Swollen eyelids  When to seek medical advice  Call your healthcare provider right away if any of these occur:  · Your face becomes more swollen or red  · Pain gets worse or spreads to your neck  · Fever of 100.4º F (38.0º C) or higher, or as directed by your healthcare provider  · Pus drains from the tooth  Date Last Reviewed: 10/1/2016  © 8373-3126 [employer]. [address] [place] [address], [place] [address]. All rights reserved. This information is not intended as a substitute for professional medical care. Always follow your healthcare professional's instructions.            Dental infection  -     amoxicillin-clavulanate 875-125mg (AUGMENTIN) 875-125 mg per tablet; Take 1 tablet by mouth 2 (two) times daily. for 10 days  Dispense: 20 tablet; Refill: 0           "

## 2021-01-05 ENCOUNTER — IMMUNIZATION (OUTPATIENT)
Dept: OBSTETRICS AND GYNECOLOGY | Facility: CLINIC | Age: 76
End: 2021-01-05
Payer: MEDICARE

## 2021-01-05 DIAGNOSIS — Z23 NEED FOR VACCINATION: ICD-10-CM

## 2021-01-05 PROCEDURE — 91300 COVID-19, MRNA, LNP-S, PF, 30 MCG/0.3 ML DOSE VACCINE: CPT | Mod: PBBFAC | Performed by: FAMILY MEDICINE

## 2021-01-07 ENCOUNTER — INFUSION (OUTPATIENT)
Dept: INFUSION THERAPY | Facility: HOSPITAL | Age: 76
End: 2021-01-07
Attending: INTERNAL MEDICINE
Payer: MEDICARE

## 2021-01-07 VITALS
DIASTOLIC BLOOD PRESSURE: 68 MMHG | RESPIRATION RATE: 18 BRPM | OXYGEN SATURATION: 100 % | SYSTOLIC BLOOD PRESSURE: 146 MMHG | TEMPERATURE: 98 F | HEART RATE: 104 BPM

## 2021-01-07 DIAGNOSIS — Z85.72 HISTORY OF NON-HODGKIN'S LYMPHOMA: Primary | ICD-10-CM

## 2021-01-07 PROCEDURE — 63600175 PHARM REV CODE 636 W HCPCS: Performed by: INTERNAL MEDICINE

## 2021-01-07 PROCEDURE — 25000003 PHARM REV CODE 250: Performed by: INTERNAL MEDICINE

## 2021-01-07 PROCEDURE — A4216 STERILE WATER/SALINE, 10 ML: HCPCS | Performed by: INTERNAL MEDICINE

## 2021-01-07 PROCEDURE — 96523 IRRIG DRUG DELIVERY DEVICE: CPT

## 2021-01-07 RX ORDER — HEPARIN 100 UNIT/ML
500 SYRINGE INTRAVENOUS
Status: DISCONTINUED | OUTPATIENT
Start: 2021-01-07 | End: 2021-01-07 | Stop reason: HOSPADM

## 2021-01-07 RX ORDER — SODIUM CHLORIDE 0.9 % (FLUSH) 0.9 %
10 SYRINGE (ML) INJECTION
Status: DISCONTINUED | OUTPATIENT
Start: 2021-01-07 | End: 2021-01-07 | Stop reason: HOSPADM

## 2021-01-07 RX ADMIN — Medication 10 ML: at 03:01

## 2021-01-07 RX ADMIN — HEPARIN 500 UNITS: 100 SYRINGE at 03:01

## 2021-01-27 ENCOUNTER — IMMUNIZATION (OUTPATIENT)
Dept: OBSTETRICS AND GYNECOLOGY | Facility: CLINIC | Age: 76
End: 2021-01-27
Payer: MEDICARE

## 2021-01-27 DIAGNOSIS — Z23 NEED FOR VACCINATION: Primary | ICD-10-CM

## 2021-01-27 PROCEDURE — 0002A COVID-19, MRNA, LNP-S, PF, 30 MCG/0.3 ML DOSE VACCINE: CPT | Mod: PBBFAC | Performed by: FAMILY MEDICINE

## 2021-01-27 PROCEDURE — 91300 COVID-19, MRNA, LNP-S, PF, 30 MCG/0.3 ML DOSE VACCINE: CPT | Mod: PBBFAC | Performed by: FAMILY MEDICINE

## 2021-02-03 ENCOUNTER — INFUSION (OUTPATIENT)
Dept: INFUSION THERAPY | Facility: HOSPITAL | Age: 76
End: 2021-02-03
Attending: INTERNAL MEDICINE
Payer: MEDICARE

## 2021-02-03 DIAGNOSIS — Z85.72 HISTORY OF NON-HODGKIN'S LYMPHOMA: Primary | ICD-10-CM

## 2021-02-03 PROCEDURE — 63600175 PHARM REV CODE 636 W HCPCS: Performed by: INTERNAL MEDICINE

## 2021-02-03 PROCEDURE — 25000003 PHARM REV CODE 250: Performed by: INTERNAL MEDICINE

## 2021-02-03 PROCEDURE — 96523 IRRIG DRUG DELIVERY DEVICE: CPT

## 2021-02-03 PROCEDURE — A4216 STERILE WATER/SALINE, 10 ML: HCPCS | Performed by: INTERNAL MEDICINE

## 2021-02-03 RX ORDER — HEPARIN 100 UNIT/ML
500 SYRINGE INTRAVENOUS
Status: DISCONTINUED | OUTPATIENT
Start: 2021-02-03 | End: 2021-02-03 | Stop reason: HOSPADM

## 2021-02-03 RX ORDER — SODIUM CHLORIDE 0.9 % (FLUSH) 0.9 %
10 SYRINGE (ML) INJECTION
Status: DISCONTINUED | OUTPATIENT
Start: 2021-02-03 | End: 2021-02-03 | Stop reason: HOSPADM

## 2021-02-03 RX ADMIN — HEPARIN 500 UNITS: 100 SYRINGE at 08:02

## 2021-02-03 RX ADMIN — Medication 10 ML: at 08:02

## 2021-03-03 ENCOUNTER — INFUSION (OUTPATIENT)
Dept: INFUSION THERAPY | Facility: HOSPITAL | Age: 76
End: 2021-03-03
Attending: INTERNAL MEDICINE
Payer: MEDICARE

## 2021-03-03 VITALS
TEMPERATURE: 99 F | HEART RATE: 93 BPM | DIASTOLIC BLOOD PRESSURE: 69 MMHG | OXYGEN SATURATION: 99 % | RESPIRATION RATE: 18 BRPM | SYSTOLIC BLOOD PRESSURE: 125 MMHG

## 2021-03-03 DIAGNOSIS — Z85.72 HISTORY OF NON-HODGKIN'S LYMPHOMA: Primary | ICD-10-CM

## 2021-03-03 PROCEDURE — 96523 IRRIG DRUG DELIVERY DEVICE: CPT

## 2021-03-03 PROCEDURE — 63600175 PHARM REV CODE 636 W HCPCS: Performed by: INTERNAL MEDICINE

## 2021-03-03 PROCEDURE — 25000003 PHARM REV CODE 250: Performed by: INTERNAL MEDICINE

## 2021-03-03 PROCEDURE — A4216 STERILE WATER/SALINE, 10 ML: HCPCS | Performed by: INTERNAL MEDICINE

## 2021-03-03 RX ORDER — SODIUM CHLORIDE 0.9 % (FLUSH) 0.9 %
10 SYRINGE (ML) INJECTION
Status: DISCONTINUED | OUTPATIENT
Start: 2021-03-03 | End: 2021-03-03 | Stop reason: HOSPADM

## 2021-03-03 RX ORDER — HEPARIN 100 UNIT/ML
500 SYRINGE INTRAVENOUS
Status: DISCONTINUED | OUTPATIENT
Start: 2021-03-03 | End: 2021-03-03 | Stop reason: HOSPADM

## 2021-03-03 RX ADMIN — HEPARIN 500 UNITS: 100 SYRINGE at 08:03

## 2021-03-03 RX ADMIN — Medication 10 ML: at 08:03

## 2021-04-07 ENCOUNTER — INFUSION (OUTPATIENT)
Dept: INFUSION THERAPY | Facility: HOSPITAL | Age: 76
End: 2021-04-07
Attending: INTERNAL MEDICINE
Payer: MEDICARE

## 2021-04-07 VITALS
DIASTOLIC BLOOD PRESSURE: 73 MMHG | SYSTOLIC BLOOD PRESSURE: 131 MMHG | HEART RATE: 90 BPM | RESPIRATION RATE: 16 BRPM | OXYGEN SATURATION: 98 % | TEMPERATURE: 98 F

## 2021-04-07 DIAGNOSIS — Z85.72 HISTORY OF NON-HODGKIN'S LYMPHOMA: Primary | ICD-10-CM

## 2021-04-07 PROCEDURE — 25000003 PHARM REV CODE 250: Performed by: INTERNAL MEDICINE

## 2021-04-07 PROCEDURE — A4216 STERILE WATER/SALINE, 10 ML: HCPCS | Performed by: INTERNAL MEDICINE

## 2021-04-07 PROCEDURE — 63600175 PHARM REV CODE 636 W HCPCS: Performed by: INTERNAL MEDICINE

## 2021-04-07 PROCEDURE — 96523 IRRIG DRUG DELIVERY DEVICE: CPT

## 2021-04-07 RX ORDER — HEPARIN 100 UNIT/ML
500 SYRINGE INTRAVENOUS
Status: DISCONTINUED | OUTPATIENT
Start: 2021-04-07 | End: 2021-04-07 | Stop reason: HOSPADM

## 2021-04-07 RX ORDER — SODIUM CHLORIDE 0.9 % (FLUSH) 0.9 %
10 SYRINGE (ML) INJECTION
Status: DISCONTINUED | OUTPATIENT
Start: 2021-04-07 | End: 2021-04-07 | Stop reason: HOSPADM

## 2021-04-07 RX ADMIN — Medication 10 ML: at 09:04

## 2021-04-07 RX ADMIN — HEPARIN 500 UNITS: 100 SYRINGE at 08:04

## 2021-04-16 ENCOUNTER — OFFICE VISIT (OUTPATIENT)
Dept: SURGERY | Facility: CLINIC | Age: 76
End: 2021-04-16
Payer: MEDICARE

## 2021-04-16 ENCOUNTER — PATIENT MESSAGE (OUTPATIENT)
Dept: SURGERY | Facility: CLINIC | Age: 76
End: 2021-04-16

## 2021-04-16 VITALS
HEART RATE: 94 BPM | BODY MASS INDEX: 25.25 KG/M2 | HEIGHT: 71 IN | SYSTOLIC BLOOD PRESSURE: 132 MMHG | DIASTOLIC BLOOD PRESSURE: 66 MMHG | WEIGHT: 180.38 LBS

## 2021-04-16 DIAGNOSIS — D12.2 ADENOMATOUS POLYP OF ASCENDING COLON: Primary | ICD-10-CM

## 2021-04-16 PROCEDURE — 1157F ADVNC CARE PLAN IN RCRD: CPT | Mod: S$GLB,,, | Performed by: COLON & RECTAL SURGERY

## 2021-04-16 PROCEDURE — 1126F AMNT PAIN NOTED NONE PRSNT: CPT | Mod: S$GLB,,, | Performed by: COLON & RECTAL SURGERY

## 2021-04-16 PROCEDURE — 1101F PT FALLS ASSESS-DOCD LE1/YR: CPT | Mod: CPTII,S$GLB,, | Performed by: COLON & RECTAL SURGERY

## 2021-04-16 PROCEDURE — 1157F PR ADVANCE CARE PLAN OR EQUIV PRESENT IN MEDICAL RECORD: ICD-10-PCS | Mod: S$GLB,,, | Performed by: COLON & RECTAL SURGERY

## 2021-04-16 PROCEDURE — 99203 PR OFFICE/OUTPT VISIT, NEW, LEVL III, 30-44 MIN: ICD-10-PCS | Mod: S$GLB,,, | Performed by: COLON & RECTAL SURGERY

## 2021-04-16 PROCEDURE — 3288F FALL RISK ASSESSMENT DOCD: CPT | Mod: CPTII,S$GLB,, | Performed by: COLON & RECTAL SURGERY

## 2021-04-16 PROCEDURE — 1159F PR MEDICATION LIST DOCUMENTED IN MEDICAL RECORD: ICD-10-PCS | Mod: S$GLB,,, | Performed by: COLON & RECTAL SURGERY

## 2021-04-16 PROCEDURE — 3008F BODY MASS INDEX DOCD: CPT | Mod: CPTII,S$GLB,, | Performed by: COLON & RECTAL SURGERY

## 2021-04-16 PROCEDURE — 1159F MED LIST DOCD IN RCRD: CPT | Mod: S$GLB,,, | Performed by: COLON & RECTAL SURGERY

## 2021-04-16 PROCEDURE — 99999 PR PBB SHADOW E&M-EST. PATIENT-LVL III: CPT | Mod: PBBFAC,,, | Performed by: COLON & RECTAL SURGERY

## 2021-04-16 PROCEDURE — 3288F PR FALLS RISK ASSESSMENT DOCUMENTED: ICD-10-PCS | Mod: CPTII,S$GLB,, | Performed by: COLON & RECTAL SURGERY

## 2021-04-16 PROCEDURE — 1126F PR PAIN SEVERITY QUANTIFIED, NO PAIN PRESENT: ICD-10-PCS | Mod: S$GLB,,, | Performed by: COLON & RECTAL SURGERY

## 2021-04-16 PROCEDURE — 1101F PR PT FALLS ASSESS DOC 0-1 FALLS W/OUT INJ PAST YR: ICD-10-PCS | Mod: CPTII,S$GLB,, | Performed by: COLON & RECTAL SURGERY

## 2021-04-16 PROCEDURE — 99203 OFFICE O/P NEW LOW 30 MIN: CPT | Mod: S$GLB,,, | Performed by: COLON & RECTAL SURGERY

## 2021-04-16 PROCEDURE — 99999 PR PBB SHADOW E&M-EST. PATIENT-LVL III: ICD-10-PCS | Mod: PBBFAC,,, | Performed by: COLON & RECTAL SURGERY

## 2021-04-16 PROCEDURE — 3008F PR BODY MASS INDEX (BMI) DOCUMENTED: ICD-10-PCS | Mod: CPTII,S$GLB,, | Performed by: COLON & RECTAL SURGERY

## 2021-04-20 ENCOUNTER — TELEPHONE (OUTPATIENT)
Dept: ENDOSCOPY | Facility: HOSPITAL | Age: 76
End: 2021-04-20

## 2021-04-20 DIAGNOSIS — K63.9 SUBMUCOSAL COLONIC LESION: Primary | ICD-10-CM

## 2021-04-20 DIAGNOSIS — Z12.11 SPECIAL SCREENING FOR MALIGNANT NEOPLASMS, COLON: Primary | ICD-10-CM

## 2021-04-20 DIAGNOSIS — D64.9 ANEMIA, UNSPECIFIED TYPE: ICD-10-CM

## 2021-04-20 DIAGNOSIS — D50.8 OTHER IRON DEFICIENCY ANEMIA: Primary | ICD-10-CM

## 2021-04-20 RX ORDER — SODIUM, POTASSIUM,MAG SULFATES 17.5-3.13G
SOLUTION, RECONSTITUTED, ORAL ORAL
Qty: 1 BOTTLE | Refills: 0 | Status: SHIPPED | OUTPATIENT
Start: 2021-04-20 | End: 2023-10-25 | Stop reason: CLARIF

## 2021-04-22 ENCOUNTER — PATIENT MESSAGE (OUTPATIENT)
Dept: ENDOSCOPY | Facility: HOSPITAL | Age: 76
End: 2021-04-22

## 2021-04-23 ENCOUNTER — LAB VISIT (OUTPATIENT)
Dept: FAMILY MEDICINE | Facility: CLINIC | Age: 76
End: 2021-04-23
Payer: MEDICARE

## 2021-04-23 ENCOUNTER — TELEPHONE (OUTPATIENT)
Dept: ENDOSCOPY | Facility: HOSPITAL | Age: 76
End: 2021-04-23

## 2021-04-23 DIAGNOSIS — Z12.11 SPECIAL SCREENING FOR MALIGNANT NEOPLASMS, COLON: ICD-10-CM

## 2021-04-23 PROCEDURE — U0005 INFEC AGEN DETEC AMPLI PROBE: HCPCS | Performed by: CLINICAL NURSE SPECIALIST

## 2021-04-23 PROCEDURE — U0003 INFECTIOUS AGENT DETECTION BY NUCLEIC ACID (DNA OR RNA); SEVERE ACUTE RESPIRATORY SYNDROME CORONAVIRUS 2 (SARS-COV-2) (CORONAVIRUS DISEASE [COVID-19]), AMPLIFIED PROBE TECHNIQUE, MAKING USE OF HIGH THROUGHPUT TECHNOLOGIES AS DESCRIBED BY CMS-2020-01-R: HCPCS | Performed by: CLINICAL NURSE SPECIALIST

## 2021-04-24 LAB — SARS-COV-2 RNA RESP QL NAA+PROBE: NOT DETECTED

## 2021-04-26 ENCOUNTER — ANESTHESIA EVENT (OUTPATIENT)
Dept: ENDOSCOPY | Facility: HOSPITAL | Age: 76
End: 2021-04-26
Payer: MEDICARE

## 2021-04-26 ENCOUNTER — HOSPITAL ENCOUNTER (OUTPATIENT)
Facility: HOSPITAL | Age: 76
Discharge: HOME | End: 2021-04-26
Attending: COLON & RECTAL SURGERY | Admitting: INTERNAL MEDICINE
Payer: MEDICARE

## 2021-04-26 ENCOUNTER — ANESTHESIA (OUTPATIENT)
Dept: ENDOSCOPY | Facility: HOSPITAL | Age: 76
End: 2021-04-26
Payer: MEDICARE

## 2021-04-26 VITALS
SYSTOLIC BLOOD PRESSURE: 116 MMHG | OXYGEN SATURATION: 100 % | TEMPERATURE: 98 F | BODY MASS INDEX: 25.62 KG/M2 | RESPIRATION RATE: 18 BRPM | HEIGHT: 71 IN | DIASTOLIC BLOOD PRESSURE: 76 MMHG | WEIGHT: 183 LBS | HEART RATE: 90 BPM

## 2021-04-26 DIAGNOSIS — D50.9 IRON DEFICIENCY ANEMIA: ICD-10-CM

## 2021-04-26 PROCEDURE — 27201012 HC FORCEPS, HOT/COLD, DISP: Performed by: COLON & RECTAL SURGERY

## 2021-04-26 PROCEDURE — 43239 PR EGD, FLEX, W/BIOPSY, SGL/MULTI: ICD-10-PCS | Mod: 59,,, | Performed by: INTERNAL MEDICINE

## 2021-04-26 PROCEDURE — 45381 COLONOSCOPY SUBMUCOUS NJX: CPT | Mod: 51,,, | Performed by: COLON & RECTAL SURGERY

## 2021-04-26 PROCEDURE — 43270 EGD LESION ABLATION: CPT | Performed by: COLON & RECTAL SURGERY

## 2021-04-26 PROCEDURE — 63600175 PHARM REV CODE 636 W HCPCS: Performed by: STUDENT IN AN ORGANIZED HEALTH CARE EDUCATION/TRAINING PROGRAM

## 2021-04-26 PROCEDURE — 37000008 HC ANESTHESIA 1ST 15 MINUTES: Performed by: COLON & RECTAL SURGERY

## 2021-04-26 PROCEDURE — 27201028 HC NEEDLE, SCLERO: Performed by: COLON & RECTAL SURGERY

## 2021-04-26 PROCEDURE — 27202087 HC PROBE, APC: Performed by: COLON & RECTAL SURGERY

## 2021-04-26 PROCEDURE — A4216 STERILE WATER/SALINE, 10 ML: HCPCS | Performed by: COLON & RECTAL SURGERY

## 2021-04-26 PROCEDURE — 37000009 HC ANESTHESIA EA ADD 15 MINS: Performed by: COLON & RECTAL SURGERY

## 2021-04-26 PROCEDURE — 88305 TISSUE EXAM BY PATHOLOGIST: ICD-10-PCS | Mod: 26,,, | Performed by: PATHOLOGY

## 2021-04-26 PROCEDURE — 43239 EGD BIOPSY SINGLE/MULTIPLE: CPT | Performed by: COLON & RECTAL SURGERY

## 2021-04-26 PROCEDURE — 27201089 HC SNARE, DISP (ANY): Performed by: COLON & RECTAL SURGERY

## 2021-04-26 PROCEDURE — E9220 PRA ENDO ANESTHESIA: HCPCS | Mod: ,,, | Performed by: STUDENT IN AN ORGANIZED HEALTH CARE EDUCATION/TRAINING PROGRAM

## 2021-04-26 PROCEDURE — 88305 TISSUE EXAM BY PATHOLOGIST: CPT | Performed by: PATHOLOGY

## 2021-04-26 PROCEDURE — 45385 COLONOSCOPY W/LESION REMOVAL: CPT | Performed by: COLON & RECTAL SURGERY

## 2021-04-26 PROCEDURE — 88305 TISSUE EXAM BY PATHOLOGIST: CPT | Mod: 26,,, | Performed by: PATHOLOGY

## 2021-04-26 PROCEDURE — 25000003 PHARM REV CODE 250: Performed by: STUDENT IN AN ORGANIZED HEALTH CARE EDUCATION/TRAINING PROGRAM

## 2021-04-26 PROCEDURE — 45381 PR COLONOSCPY,FLEX,W/DIR SUBMUC INJECT: ICD-10-PCS | Mod: 51,,, | Performed by: COLON & RECTAL SURGERY

## 2021-04-26 PROCEDURE — E9220 PRA ENDO ANESTHESIA: ICD-10-PCS | Mod: ,,, | Performed by: STUDENT IN AN ORGANIZED HEALTH CARE EDUCATION/TRAINING PROGRAM

## 2021-04-26 PROCEDURE — 25000003 PHARM REV CODE 250: Performed by: COLON & RECTAL SURGERY

## 2021-04-26 PROCEDURE — 45385 COLONOSCOPY W/LESION REMOVAL: CPT | Mod: ,,, | Performed by: COLON & RECTAL SURGERY

## 2021-04-26 PROCEDURE — 63600175 PHARM REV CODE 636 W HCPCS: Performed by: ANESTHESIOLOGY

## 2021-04-26 PROCEDURE — 43270 EGD LESION ABLATION: CPT | Mod: ,,, | Performed by: INTERNAL MEDICINE

## 2021-04-26 PROCEDURE — 43270 PR EGD, FLEX, W/ABLATION, TUMOR/POLYP/LESION(S), W/ DILATION: ICD-10-PCS | Mod: ,,, | Performed by: INTERNAL MEDICINE

## 2021-04-26 PROCEDURE — 43239 EGD BIOPSY SINGLE/MULTIPLE: CPT | Mod: 59,,, | Performed by: INTERNAL MEDICINE

## 2021-04-26 PROCEDURE — 45385 PR COLONOSCOPY,REMV LESN,SNARE: ICD-10-PCS | Mod: ,,, | Performed by: COLON & RECTAL SURGERY

## 2021-04-26 PROCEDURE — 45381 COLONOSCOPY SUBMUCOUS NJX: CPT | Performed by: COLON & RECTAL SURGERY

## 2021-04-26 RX ORDER — HEPARIN 100 UNIT/ML
5 SYRINGE INTRAVENOUS ONCE
Status: COMPLETED | OUTPATIENT
Start: 2021-04-26 | End: 2021-04-26

## 2021-04-26 RX ORDER — SODIUM CHLORIDE 0.9 % (FLUSH) 0.9 %
SYRINGE (ML) INJECTION
Status: COMPLETED | OUTPATIENT
Start: 2021-04-26 | End: 2021-04-26

## 2021-04-26 RX ORDER — PROPOFOL 10 MG/ML
VIAL (ML) INTRAVENOUS
Status: DISCONTINUED | OUTPATIENT
Start: 2021-04-26 | End: 2021-04-26

## 2021-04-26 RX ORDER — PHENYLEPHRINE HCL IN 0.9% NACL 1 MG/10 ML
SYRINGE (ML) INTRAVENOUS
Status: DISCONTINUED | OUTPATIENT
Start: 2021-04-26 | End: 2021-04-26

## 2021-04-26 RX ORDER — SODIUM CHLORIDE 9 MG/ML
INJECTION, SOLUTION INTRAVENOUS CONTINUOUS
Status: DISCONTINUED | OUTPATIENT
Start: 2021-04-26 | End: 2021-04-26 | Stop reason: HOSPADM

## 2021-04-26 RX ORDER — PROPOFOL 10 MG/ML
VIAL (ML) INTRAVENOUS CONTINUOUS PRN
Status: DISCONTINUED | OUTPATIENT
Start: 2021-04-26 | End: 2021-04-26

## 2021-04-26 RX ADMIN — Medication 100 MCG: at 07:04

## 2021-04-26 RX ADMIN — Medication 200 MCG: at 08:04

## 2021-04-26 RX ADMIN — SODIUM CHLORIDE 10 ML: 9 INJECTION, SOLUTION INTRAMUSCULAR; INTRAVENOUS; SUBCUTANEOUS at 07:04

## 2021-04-26 RX ADMIN — SODIUM CHLORIDE: 0.9 INJECTION, SOLUTION INTRAVENOUS at 07:04

## 2021-04-26 RX ADMIN — PROPOFOL 70 MG: 10 INJECTION, EMULSION INTRAVENOUS at 07:04

## 2021-04-26 RX ADMIN — PROPOFOL 200 MCG/KG/MIN: 10 INJECTION, EMULSION INTRAVENOUS at 07:04

## 2021-04-26 RX ADMIN — GLYCOPYRROLATE 0.2 MG: 0.2 INJECTION, SOLUTION INTRAMUSCULAR; INTRAVITREAL at 07:04

## 2021-04-26 RX ADMIN — Medication 200 MCG: at 07:04

## 2021-04-26 RX ADMIN — HEPARIN 500 UNITS: 100 SYRINGE at 08:04

## 2021-05-04 ENCOUNTER — PATIENT MESSAGE (OUTPATIENT)
Dept: GASTROENTEROLOGY | Facility: CLINIC | Age: 76
End: 2021-05-04

## 2021-05-04 LAB
FINAL PATHOLOGIC DIAGNOSIS: NORMAL
GROSS: NORMAL
Lab: NORMAL

## 2021-05-06 ENCOUNTER — OFFICE VISIT (OUTPATIENT)
Dept: UROLOGY | Facility: CLINIC | Age: 76
End: 2021-05-06
Payer: MEDICARE

## 2021-05-06 VITALS — WEIGHT: 193 LBS | BODY MASS INDEX: 27.02 KG/M2 | HEIGHT: 71 IN

## 2021-05-06 DIAGNOSIS — N52.8 OTHER MALE ERECTILE DYSFUNCTION: ICD-10-CM

## 2021-05-06 DIAGNOSIS — R97.20 ELEVATED PSA: Primary | ICD-10-CM

## 2021-05-06 PROCEDURE — 1101F PT FALLS ASSESS-DOCD LE1/YR: CPT | Mod: CPTII,S$GLB,, | Performed by: STUDENT IN AN ORGANIZED HEALTH CARE EDUCATION/TRAINING PROGRAM

## 2021-05-06 PROCEDURE — 1159F MED LIST DOCD IN RCRD: CPT | Mod: S$GLB,,, | Performed by: STUDENT IN AN ORGANIZED HEALTH CARE EDUCATION/TRAINING PROGRAM

## 2021-05-06 PROCEDURE — 1159F PR MEDICATION LIST DOCUMENTED IN MEDICAL RECORD: ICD-10-PCS | Mod: S$GLB,,, | Performed by: STUDENT IN AN ORGANIZED HEALTH CARE EDUCATION/TRAINING PROGRAM

## 2021-05-06 PROCEDURE — 3008F PR BODY MASS INDEX (BMI) DOCUMENTED: ICD-10-PCS | Mod: CPTII,S$GLB,, | Performed by: STUDENT IN AN ORGANIZED HEALTH CARE EDUCATION/TRAINING PROGRAM

## 2021-05-06 PROCEDURE — 1157F ADVNC CARE PLAN IN RCRD: CPT | Mod: S$GLB,,, | Performed by: STUDENT IN AN ORGANIZED HEALTH CARE EDUCATION/TRAINING PROGRAM

## 2021-05-06 PROCEDURE — 1126F PR PAIN SEVERITY QUANTIFIED, NO PAIN PRESENT: ICD-10-PCS | Mod: S$GLB,,, | Performed by: STUDENT IN AN ORGANIZED HEALTH CARE EDUCATION/TRAINING PROGRAM

## 2021-05-06 PROCEDURE — 99204 PR OFFICE/OUTPT VISIT, NEW, LEVL IV, 45-59 MIN: ICD-10-PCS | Mod: S$GLB,,, | Performed by: STUDENT IN AN ORGANIZED HEALTH CARE EDUCATION/TRAINING PROGRAM

## 2021-05-06 PROCEDURE — 1101F PR PT FALLS ASSESS DOC 0-1 FALLS W/OUT INJ PAST YR: ICD-10-PCS | Mod: CPTII,S$GLB,, | Performed by: STUDENT IN AN ORGANIZED HEALTH CARE EDUCATION/TRAINING PROGRAM

## 2021-05-06 PROCEDURE — 1126F AMNT PAIN NOTED NONE PRSNT: CPT | Mod: S$GLB,,, | Performed by: STUDENT IN AN ORGANIZED HEALTH CARE EDUCATION/TRAINING PROGRAM

## 2021-05-06 PROCEDURE — 3288F FALL RISK ASSESSMENT DOCD: CPT | Mod: CPTII,S$GLB,, | Performed by: STUDENT IN AN ORGANIZED HEALTH CARE EDUCATION/TRAINING PROGRAM

## 2021-05-06 PROCEDURE — 3288F PR FALLS RISK ASSESSMENT DOCUMENTED: ICD-10-PCS | Mod: CPTII,S$GLB,, | Performed by: STUDENT IN AN ORGANIZED HEALTH CARE EDUCATION/TRAINING PROGRAM

## 2021-05-06 PROCEDURE — 99999 PR PBB SHADOW E&M-EST. PATIENT-LVL III: ICD-10-PCS | Mod: PBBFAC,,, | Performed by: STUDENT IN AN ORGANIZED HEALTH CARE EDUCATION/TRAINING PROGRAM

## 2021-05-06 PROCEDURE — 3008F BODY MASS INDEX DOCD: CPT | Mod: CPTII,S$GLB,, | Performed by: STUDENT IN AN ORGANIZED HEALTH CARE EDUCATION/TRAINING PROGRAM

## 2021-05-06 PROCEDURE — 99204 OFFICE O/P NEW MOD 45 MIN: CPT | Mod: S$GLB,,, | Performed by: STUDENT IN AN ORGANIZED HEALTH CARE EDUCATION/TRAINING PROGRAM

## 2021-05-06 PROCEDURE — 1157F PR ADVANCE CARE PLAN OR EQUIV PRESENT IN MEDICAL RECORD: ICD-10-PCS | Mod: S$GLB,,, | Performed by: STUDENT IN AN ORGANIZED HEALTH CARE EDUCATION/TRAINING PROGRAM

## 2021-05-06 PROCEDURE — 99999 PR PBB SHADOW E&M-EST. PATIENT-LVL III: CPT | Mod: PBBFAC,,, | Performed by: STUDENT IN AN ORGANIZED HEALTH CARE EDUCATION/TRAINING PROGRAM

## 2021-05-06 RX ORDER — CIPROFLOXACIN 500 MG/1
500 TABLET ORAL EVERY 12 HOURS
Qty: 6 TABLET | Refills: 0 | Status: SHIPPED | OUTPATIENT
Start: 2021-05-06 | End: 2021-05-09

## 2021-05-06 RX ORDER — ENEMA 19; 7 G/133ML; G/133ML
1 ENEMA RECTAL ONCE
Qty: 133 ML | Refills: 0 | Status: SHIPPED | OUTPATIENT
Start: 2021-05-06 | End: 2021-05-06

## 2021-05-20 ENCOUNTER — PROCEDURE VISIT (OUTPATIENT)
Dept: UROLOGY | Facility: CLINIC | Age: 76
End: 2021-05-20
Payer: MEDICARE

## 2021-05-20 DIAGNOSIS — R97.20 ELEVATED PSA: Primary | ICD-10-CM

## 2021-05-20 PROCEDURE — 88305 TISSUE EXAM BY PATHOLOGIST: CPT | Mod: 26,,, | Performed by: PATHOLOGY

## 2021-05-20 PROCEDURE — 96372 THER/PROPH/DIAG INJ SC/IM: CPT | Mod: 59,S$GLB,, | Performed by: STUDENT IN AN ORGANIZED HEALTH CARE EDUCATION/TRAINING PROGRAM

## 2021-05-20 PROCEDURE — 76872 US TRANSRECTAL: CPT | Mod: 26,S$GLB,, | Performed by: STUDENT IN AN ORGANIZED HEALTH CARE EDUCATION/TRAINING PROGRAM

## 2021-05-20 PROCEDURE — 55700 TRANSRECTAL ULTRASOUND W/ BIOPSY: ICD-10-PCS | Mod: S$GLB,,, | Performed by: STUDENT IN AN ORGANIZED HEALTH CARE EDUCATION/TRAINING PROGRAM

## 2021-05-20 PROCEDURE — 76872 TRANSRECTAL ULTRASOUND W/ BIOPSY: ICD-10-PCS | Mod: 26,S$GLB,, | Performed by: STUDENT IN AN ORGANIZED HEALTH CARE EDUCATION/TRAINING PROGRAM

## 2021-05-20 PROCEDURE — 88305 TISSUE EXAM BY PATHOLOGIST: ICD-10-PCS | Mod: 26,,, | Performed by: PATHOLOGY

## 2021-05-20 PROCEDURE — 55700 TRANSRECTAL ULTRASOUND W/ BIOPSY: CPT | Mod: S$GLB,,, | Performed by: STUDENT IN AN ORGANIZED HEALTH CARE EDUCATION/TRAINING PROGRAM

## 2021-05-20 PROCEDURE — 96372 PR INJECTION,THERAP/PROPH/DIAG2ST, IM OR SUBCUT: ICD-10-PCS | Mod: 59,S$GLB,, | Performed by: STUDENT IN AN ORGANIZED HEALTH CARE EDUCATION/TRAINING PROGRAM

## 2021-05-20 PROCEDURE — 88305 TISSUE EXAM BY PATHOLOGIST: CPT | Performed by: PATHOLOGY

## 2021-05-20 RX ORDER — GENTAMICIN SULFATE 40 MG/ML
80 INJECTION, SOLUTION INTRAMUSCULAR; INTRAVENOUS ONCE
Status: COMPLETED | OUTPATIENT
Start: 2021-05-20 | End: 2021-05-20

## 2021-05-20 RX ADMIN — GENTAMICIN SULFATE 80 MG: 40 INJECTION, SOLUTION INTRAMUSCULAR; INTRAVENOUS at 10:05

## 2021-06-01 LAB
FINAL PATHOLOGIC DIAGNOSIS: NORMAL
GROSS: NORMAL
Lab: NORMAL
MICROSCOPIC EXAM: NORMAL

## 2021-06-02 ENCOUNTER — INFUSION (OUTPATIENT)
Dept: INFUSION THERAPY | Facility: HOSPITAL | Age: 76
End: 2021-06-02
Attending: INTERNAL MEDICINE
Payer: MEDICARE

## 2021-06-02 DIAGNOSIS — Z85.72 HISTORY OF NON-HODGKIN'S LYMPHOMA: Primary | ICD-10-CM

## 2021-06-02 PROCEDURE — 63600175 PHARM REV CODE 636 W HCPCS: Performed by: INTERNAL MEDICINE

## 2021-06-02 PROCEDURE — A4216 STERILE WATER/SALINE, 10 ML: HCPCS | Performed by: INTERNAL MEDICINE

## 2021-06-02 PROCEDURE — 96523 IRRIG DRUG DELIVERY DEVICE: CPT

## 2021-06-02 PROCEDURE — 25000003 PHARM REV CODE 250: Performed by: INTERNAL MEDICINE

## 2021-06-02 RX ORDER — SODIUM CHLORIDE 0.9 % (FLUSH) 0.9 %
10 SYRINGE (ML) INJECTION
Status: DISCONTINUED | OUTPATIENT
Start: 2021-06-02 | End: 2021-06-02 | Stop reason: HOSPADM

## 2021-06-02 RX ORDER — HEPARIN 100 UNIT/ML
500 SYRINGE INTRAVENOUS
Status: DISCONTINUED | OUTPATIENT
Start: 2021-06-02 | End: 2021-06-02 | Stop reason: HOSPADM

## 2021-06-02 RX ADMIN — Medication 10 ML: at 08:06

## 2021-06-02 RX ADMIN — HEPARIN 500 UNITS: 100 SYRINGE at 08:06

## 2021-06-04 ENCOUNTER — OFFICE VISIT (OUTPATIENT)
Dept: UROLOGY | Facility: CLINIC | Age: 76
End: 2021-06-04
Payer: MEDICARE

## 2021-06-04 VITALS — WEIGHT: 193 LBS | BODY MASS INDEX: 27.02 KG/M2 | HEIGHT: 71 IN

## 2021-06-04 DIAGNOSIS — C61 PROSTATE CANCER: Primary | ICD-10-CM

## 2021-06-04 PROCEDURE — 1126F AMNT PAIN NOTED NONE PRSNT: CPT | Mod: S$GLB,,, | Performed by: STUDENT IN AN ORGANIZED HEALTH CARE EDUCATION/TRAINING PROGRAM

## 2021-06-04 PROCEDURE — 3288F FALL RISK ASSESSMENT DOCD: CPT | Mod: CPTII,S$GLB,, | Performed by: STUDENT IN AN ORGANIZED HEALTH CARE EDUCATION/TRAINING PROGRAM

## 2021-06-04 PROCEDURE — 1101F PR PT FALLS ASSESS DOC 0-1 FALLS W/OUT INJ PAST YR: ICD-10-PCS | Mod: CPTII,S$GLB,, | Performed by: STUDENT IN AN ORGANIZED HEALTH CARE EDUCATION/TRAINING PROGRAM

## 2021-06-04 PROCEDURE — 1159F PR MEDICATION LIST DOCUMENTED IN MEDICAL RECORD: ICD-10-PCS | Mod: S$GLB,,, | Performed by: STUDENT IN AN ORGANIZED HEALTH CARE EDUCATION/TRAINING PROGRAM

## 2021-06-04 PROCEDURE — 99999 PR PBB SHADOW E&M-EST. PATIENT-LVL III: ICD-10-PCS | Mod: PBBFAC,,, | Performed by: STUDENT IN AN ORGANIZED HEALTH CARE EDUCATION/TRAINING PROGRAM

## 2021-06-04 PROCEDURE — 1157F ADVNC CARE PLAN IN RCRD: CPT | Mod: S$GLB,,, | Performed by: STUDENT IN AN ORGANIZED HEALTH CARE EDUCATION/TRAINING PROGRAM

## 2021-06-04 PROCEDURE — 99999 PR PBB SHADOW E&M-EST. PATIENT-LVL III: CPT | Mod: PBBFAC,,, | Performed by: STUDENT IN AN ORGANIZED HEALTH CARE EDUCATION/TRAINING PROGRAM

## 2021-06-04 PROCEDURE — 3288F PR FALLS RISK ASSESSMENT DOCUMENTED: ICD-10-PCS | Mod: CPTII,S$GLB,, | Performed by: STUDENT IN AN ORGANIZED HEALTH CARE EDUCATION/TRAINING PROGRAM

## 2021-06-04 PROCEDURE — 99214 PR OFFICE/OUTPT VISIT, EST, LEVL IV, 30-39 MIN: ICD-10-PCS | Mod: S$GLB,,, | Performed by: STUDENT IN AN ORGANIZED HEALTH CARE EDUCATION/TRAINING PROGRAM

## 2021-06-04 PROCEDURE — 3008F PR BODY MASS INDEX (BMI) DOCUMENTED: ICD-10-PCS | Mod: CPTII,S$GLB,, | Performed by: STUDENT IN AN ORGANIZED HEALTH CARE EDUCATION/TRAINING PROGRAM

## 2021-06-04 PROCEDURE — 99214 OFFICE O/P EST MOD 30 MIN: CPT | Mod: S$GLB,,, | Performed by: STUDENT IN AN ORGANIZED HEALTH CARE EDUCATION/TRAINING PROGRAM

## 2021-06-04 PROCEDURE — 1157F PR ADVANCE CARE PLAN OR EQUIV PRESENT IN MEDICAL RECORD: ICD-10-PCS | Mod: S$GLB,,, | Performed by: STUDENT IN AN ORGANIZED HEALTH CARE EDUCATION/TRAINING PROGRAM

## 2021-06-04 PROCEDURE — 1159F MED LIST DOCD IN RCRD: CPT | Mod: S$GLB,,, | Performed by: STUDENT IN AN ORGANIZED HEALTH CARE EDUCATION/TRAINING PROGRAM

## 2021-06-04 PROCEDURE — 3008F BODY MASS INDEX DOCD: CPT | Mod: CPTII,S$GLB,, | Performed by: STUDENT IN AN ORGANIZED HEALTH CARE EDUCATION/TRAINING PROGRAM

## 2021-06-04 PROCEDURE — 1101F PT FALLS ASSESS-DOCD LE1/YR: CPT | Mod: CPTII,S$GLB,, | Performed by: STUDENT IN AN ORGANIZED HEALTH CARE EDUCATION/TRAINING PROGRAM

## 2021-06-04 PROCEDURE — 1126F PR PAIN SEVERITY QUANTIFIED, NO PAIN PRESENT: ICD-10-PCS | Mod: S$GLB,,, | Performed by: STUDENT IN AN ORGANIZED HEALTH CARE EDUCATION/TRAINING PROGRAM

## 2021-06-07 ENCOUNTER — OFFICE VISIT (OUTPATIENT)
Dept: RADIATION ONCOLOGY | Facility: CLINIC | Age: 76
End: 2021-06-07
Payer: MEDICARE

## 2021-06-07 VITALS
SYSTOLIC BLOOD PRESSURE: 147 MMHG | BODY MASS INDEX: 24.85 KG/M2 | HEART RATE: 92 BPM | WEIGHT: 177.5 LBS | HEIGHT: 71 IN | RESPIRATION RATE: 16 BRPM | DIASTOLIC BLOOD PRESSURE: 89 MMHG

## 2021-06-07 DIAGNOSIS — C61 PROSTATE CANCER: ICD-10-CM

## 2021-06-07 DIAGNOSIS — C61 PROSTATE CANCER: Primary | ICD-10-CM

## 2021-06-07 PROCEDURE — 3288F PR FALLS RISK ASSESSMENT DOCUMENTED: ICD-10-PCS | Mod: CPTII,S$GLB,, | Performed by: RADIOLOGY

## 2021-06-07 PROCEDURE — 3288F FALL RISK ASSESSMENT DOCD: CPT | Mod: CPTII,S$GLB,, | Performed by: RADIOLOGY

## 2021-06-07 PROCEDURE — 1101F PT FALLS ASSESS-DOCD LE1/YR: CPT | Mod: CPTII,S$GLB,, | Performed by: RADIOLOGY

## 2021-06-07 PROCEDURE — 1159F MED LIST DOCD IN RCRD: CPT | Mod: S$GLB,,, | Performed by: RADIOLOGY

## 2021-06-07 PROCEDURE — 1126F AMNT PAIN NOTED NONE PRSNT: CPT | Mod: S$GLB,,, | Performed by: RADIOLOGY

## 2021-06-07 PROCEDURE — 1157F PR ADVANCE CARE PLAN OR EQUIV PRESENT IN MEDICAL RECORD: ICD-10-PCS | Mod: S$GLB,,, | Performed by: RADIOLOGY

## 2021-06-07 PROCEDURE — 99999 PR PBB SHADOW E&M-EST. PATIENT-LVL III: ICD-10-PCS | Mod: PBBFAC,,, | Performed by: RADIOLOGY

## 2021-06-07 PROCEDURE — 1159F PR MEDICATION LIST DOCUMENTED IN MEDICAL RECORD: ICD-10-PCS | Mod: S$GLB,,, | Performed by: RADIOLOGY

## 2021-06-07 PROCEDURE — 1126F PR PAIN SEVERITY QUANTIFIED, NO PAIN PRESENT: ICD-10-PCS | Mod: S$GLB,,, | Performed by: RADIOLOGY

## 2021-06-07 PROCEDURE — 1157F ADVNC CARE PLAN IN RCRD: CPT | Mod: S$GLB,,, | Performed by: RADIOLOGY

## 2021-06-07 PROCEDURE — 3008F BODY MASS INDEX DOCD: CPT | Mod: CPTII,S$GLB,, | Performed by: RADIOLOGY

## 2021-06-07 PROCEDURE — 99999 PR PBB SHADOW E&M-EST. PATIENT-LVL III: CPT | Mod: PBBFAC,,, | Performed by: RADIOLOGY

## 2021-06-07 PROCEDURE — 99204 PR OFFICE/OUTPT VISIT, NEW, LEVL IV, 45-59 MIN: ICD-10-PCS | Mod: S$GLB,,, | Performed by: RADIOLOGY

## 2021-06-07 PROCEDURE — 99204 OFFICE O/P NEW MOD 45 MIN: CPT | Mod: S$GLB,,, | Performed by: RADIOLOGY

## 2021-06-07 PROCEDURE — 3008F PR BODY MASS INDEX (BMI) DOCUMENTED: ICD-10-PCS | Mod: CPTII,S$GLB,, | Performed by: RADIOLOGY

## 2021-06-07 PROCEDURE — 1101F PR PT FALLS ASSESS DOC 0-1 FALLS W/OUT INJ PAST YR: ICD-10-PCS | Mod: CPTII,S$GLB,, | Performed by: RADIOLOGY

## 2021-06-19 ENCOUNTER — HOSPITAL ENCOUNTER (EMERGENCY)
Facility: HOSPITAL | Age: 76
Discharge: HOME | End: 2021-06-19
Attending: EMERGENCY MEDICINE
Payer: MEDICARE

## 2021-06-19 VITALS
OXYGEN SATURATION: 98 % | HEIGHT: 70 IN | WEIGHT: 179 LBS | DIASTOLIC BLOOD PRESSURE: 82 MMHG | HEART RATE: 84 BPM | SYSTOLIC BLOOD PRESSURE: 141 MMHG | RESPIRATION RATE: 18 BRPM | BODY MASS INDEX: 25.62 KG/M2 | TEMPERATURE: 99 F

## 2021-06-19 DIAGNOSIS — R52 PAIN: ICD-10-CM

## 2021-06-19 DIAGNOSIS — M17.12 OSTEOARTHRITIS OF LEFT KNEE, UNSPECIFIED OSTEOARTHRITIS TYPE: Primary | ICD-10-CM

## 2021-06-19 PROCEDURE — 99284 EMERGENCY DEPT VISIT MOD MDM: CPT | Mod: 25,ER

## 2021-06-19 PROCEDURE — 25000003 PHARM REV CODE 250: Mod: ER | Performed by: PHYSICIAN ASSISTANT

## 2021-06-19 RX ORDER — DICLOFENAC SODIUM 10 MG/G
2 GEL TOPICAL 4 TIMES DAILY PRN
Qty: 100 G | Refills: 0 | Status: SHIPPED | OUTPATIENT
Start: 2021-06-19

## 2021-06-19 RX ORDER — DEXTROMETHORPHAN HYDROBROMIDE, GUAIFENESIN 5; 100 MG/5ML; MG/5ML
650 LIQUID ORAL EVERY 8 HOURS PRN
Qty: 30 TABLET | Refills: 0 | Status: SHIPPED | OUTPATIENT
Start: 2021-06-19

## 2021-06-19 RX ORDER — ACETAMINOPHEN 325 MG/1
650 TABLET ORAL
Status: COMPLETED | OUTPATIENT
Start: 2021-06-19 | End: 2021-06-19

## 2021-06-19 RX ADMIN — ACETAMINOPHEN 650 MG: 325 TABLET ORAL at 12:06

## 2021-07-07 ENCOUNTER — INFUSION (OUTPATIENT)
Dept: INFUSION THERAPY | Facility: HOSPITAL | Age: 76
End: 2021-07-07
Attending: INTERNAL MEDICINE
Payer: MEDICARE

## 2021-07-07 VITALS
OXYGEN SATURATION: 99 % | SYSTOLIC BLOOD PRESSURE: 133 MMHG | TEMPERATURE: 98 F | DIASTOLIC BLOOD PRESSURE: 69 MMHG | RESPIRATION RATE: 18 BRPM | HEART RATE: 92 BPM

## 2021-07-07 DIAGNOSIS — Z85.72 HISTORY OF NON-HODGKIN'S LYMPHOMA: Primary | ICD-10-CM

## 2021-07-07 PROCEDURE — A4216 STERILE WATER/SALINE, 10 ML: HCPCS | Performed by: INTERNAL MEDICINE

## 2021-07-07 PROCEDURE — 63600175 PHARM REV CODE 636 W HCPCS: Performed by: INTERNAL MEDICINE

## 2021-07-07 PROCEDURE — 25000003 PHARM REV CODE 250: Performed by: INTERNAL MEDICINE

## 2021-07-07 PROCEDURE — 96523 IRRIG DRUG DELIVERY DEVICE: CPT

## 2021-07-07 RX ORDER — SODIUM CHLORIDE 0.9 % (FLUSH) 0.9 %
10 SYRINGE (ML) INJECTION
Status: DISCONTINUED | OUTPATIENT
Start: 2021-07-07 | End: 2021-07-07 | Stop reason: HOSPADM

## 2021-07-07 RX ORDER — HEPARIN 100 UNIT/ML
500 SYRINGE INTRAVENOUS
Status: DISCONTINUED | OUTPATIENT
Start: 2021-07-07 | End: 2021-07-07 | Stop reason: HOSPADM

## 2021-07-07 RX ADMIN — Medication 10 ML: at 08:07

## 2021-07-07 RX ADMIN — HEPARIN 500 UNITS: 100 SYRINGE at 08:07

## 2021-08-04 ENCOUNTER — INFUSION (OUTPATIENT)
Dept: INFUSION THERAPY | Facility: HOSPITAL | Age: 76
End: 2021-08-04
Attending: INTERNAL MEDICINE
Payer: MEDICARE

## 2021-08-04 DIAGNOSIS — Z85.72 HISTORY OF NON-HODGKIN'S LYMPHOMA: Primary | ICD-10-CM

## 2021-08-04 PROCEDURE — A4216 STERILE WATER/SALINE, 10 ML: HCPCS | Performed by: INTERNAL MEDICINE

## 2021-08-04 PROCEDURE — 96523 IRRIG DRUG DELIVERY DEVICE: CPT

## 2021-08-04 PROCEDURE — 25000003 PHARM REV CODE 250: Performed by: INTERNAL MEDICINE

## 2021-08-04 RX ORDER — SODIUM CHLORIDE 0.9 % (FLUSH) 0.9 %
10 SYRINGE (ML) INJECTION
Status: DISCONTINUED | OUTPATIENT
Start: 2021-08-04 | End: 2021-08-04 | Stop reason: HOSPADM

## 2021-08-04 RX ORDER — HEPARIN 100 UNIT/ML
500 SYRINGE INTRAVENOUS
Status: DISCONTINUED | OUTPATIENT
Start: 2021-08-04 | End: 2021-08-04 | Stop reason: HOSPADM

## 2021-08-04 RX ADMIN — Medication 10 ML: at 08:08

## 2021-10-12 ENCOUNTER — IMMUNIZATION (OUTPATIENT)
Dept: OBSTETRICS AND GYNECOLOGY | Facility: CLINIC | Age: 76
End: 2021-10-12
Payer: MEDICARE

## 2021-10-12 DIAGNOSIS — Z23 NEED FOR VACCINATION: Primary | ICD-10-CM

## 2021-10-12 PROCEDURE — 91300 COVID-19, MRNA, LNP-S, PF, 30 MCG/0.3 ML DOSE VACCINE: CPT | Mod: PBBFAC | Performed by: FAMILY MEDICINE

## 2021-10-12 PROCEDURE — 0003A COVID-19, MRNA, LNP-S, PF, 30 MCG/0.3 ML DOSE VACCINE: CPT | Mod: PBBFAC | Performed by: FAMILY MEDICINE

## 2021-12-08 ENCOUNTER — INFUSION (OUTPATIENT)
Dept: INFUSION THERAPY | Facility: HOSPITAL | Age: 76
End: 2021-12-08
Attending: INTERNAL MEDICINE
Payer: MEDICARE

## 2021-12-08 VITALS
SYSTOLIC BLOOD PRESSURE: 123 MMHG | RESPIRATION RATE: 16 BRPM | DIASTOLIC BLOOD PRESSURE: 69 MMHG | HEART RATE: 96 BPM | OXYGEN SATURATION: 99 % | TEMPERATURE: 99 F

## 2021-12-08 DIAGNOSIS — Z85.72 HISTORY OF NON-HODGKIN'S LYMPHOMA: Primary | ICD-10-CM

## 2021-12-08 PROCEDURE — 36591 DRAW BLOOD OFF VENOUS DEVICE: CPT

## 2021-12-08 PROCEDURE — A4216 STERILE WATER/SALINE, 10 ML: HCPCS | Performed by: INTERNAL MEDICINE

## 2021-12-08 PROCEDURE — 63600175 PHARM REV CODE 636 W HCPCS: Performed by: INTERNAL MEDICINE

## 2021-12-08 PROCEDURE — 25000003 PHARM REV CODE 250: Performed by: INTERNAL MEDICINE

## 2021-12-08 RX ORDER — SODIUM CHLORIDE 0.9 % (FLUSH) 0.9 %
10 SYRINGE (ML) INJECTION
Status: DISCONTINUED | OUTPATIENT
Start: 2021-12-08 | End: 2021-12-08 | Stop reason: HOSPADM

## 2021-12-08 RX ORDER — HEPARIN 100 UNIT/ML
500 SYRINGE INTRAVENOUS
Status: DISCONTINUED | OUTPATIENT
Start: 2021-12-08 | End: 2021-12-08 | Stop reason: HOSPADM

## 2021-12-08 RX ADMIN — HEPARIN 500 UNITS: 100 SYRINGE at 08:12

## 2021-12-08 RX ADMIN — Medication 10 ML: at 08:12

## 2022-02-03 ENCOUNTER — INFUSION (OUTPATIENT)
Dept: INFUSION THERAPY | Facility: HOSPITAL | Age: 77
End: 2022-02-03
Attending: INTERNAL MEDICINE
Payer: MEDICARE

## 2022-02-03 VITALS
DIASTOLIC BLOOD PRESSURE: 70 MMHG | SYSTOLIC BLOOD PRESSURE: 134 MMHG | TEMPERATURE: 99 F | HEART RATE: 93 BPM | OXYGEN SATURATION: 98 % | RESPIRATION RATE: 16 BRPM

## 2022-02-03 DIAGNOSIS — Z85.72 HISTORY OF NON-HODGKIN'S LYMPHOMA: Primary | ICD-10-CM

## 2022-02-03 PROCEDURE — 25000003 PHARM REV CODE 250: Performed by: INTERNAL MEDICINE

## 2022-02-03 PROCEDURE — 96523 IRRIG DRUG DELIVERY DEVICE: CPT

## 2022-02-03 PROCEDURE — A4216 STERILE WATER/SALINE, 10 ML: HCPCS | Performed by: INTERNAL MEDICINE

## 2022-02-03 PROCEDURE — 63600175 PHARM REV CODE 636 W HCPCS: Performed by: INTERNAL MEDICINE

## 2022-02-03 RX ORDER — HEPARIN 100 UNIT/ML
500 SYRINGE INTRAVENOUS
Status: DISCONTINUED | OUTPATIENT
Start: 2022-02-03 | End: 2022-02-03 | Stop reason: HOSPADM

## 2022-02-03 RX ORDER — SODIUM CHLORIDE 0.9 % (FLUSH) 0.9 %
10 SYRINGE (ML) INJECTION
Status: DISCONTINUED | OUTPATIENT
Start: 2022-02-03 | End: 2022-02-03 | Stop reason: HOSPADM

## 2022-02-03 RX ADMIN — Medication 10 ML: at 08:02

## 2022-02-03 RX ADMIN — HEPARIN 500 UNITS: 100 SYRINGE at 08:02

## 2022-02-03 NOTE — PLAN OF CARE
Pt arrived to unit. VSS. No new issues noted. Tolerated port flush. Pt given next appt. Pt ambulated off unit. No distress noted.

## 2022-03-03 ENCOUNTER — INFUSION (OUTPATIENT)
Dept: INFUSION THERAPY | Facility: HOSPITAL | Age: 77
End: 2022-03-03
Attending: INTERNAL MEDICINE
Payer: MEDICARE

## 2022-03-03 VITALS
RESPIRATION RATE: 16 BRPM | OXYGEN SATURATION: 99 % | HEART RATE: 101 BPM | TEMPERATURE: 99 F | DIASTOLIC BLOOD PRESSURE: 72 MMHG | SYSTOLIC BLOOD PRESSURE: 146 MMHG

## 2022-03-03 DIAGNOSIS — Z85.72 HISTORY OF NON-HODGKIN'S LYMPHOMA: Primary | ICD-10-CM

## 2022-03-03 PROCEDURE — 25000003 PHARM REV CODE 250: Performed by: INTERNAL MEDICINE

## 2022-03-03 PROCEDURE — 96523 IRRIG DRUG DELIVERY DEVICE: CPT

## 2022-03-03 PROCEDURE — 63600175 PHARM REV CODE 636 W HCPCS: Performed by: INTERNAL MEDICINE

## 2022-03-03 PROCEDURE — A4216 STERILE WATER/SALINE, 10 ML: HCPCS | Performed by: INTERNAL MEDICINE

## 2022-03-03 RX ORDER — HEPARIN 100 UNIT/ML
500 SYRINGE INTRAVENOUS
Status: DISCONTINUED | OUTPATIENT
Start: 2022-03-03 | End: 2022-03-03 | Stop reason: HOSPADM

## 2022-03-03 RX ORDER — SODIUM CHLORIDE 0.9 % (FLUSH) 0.9 %
10 SYRINGE (ML) INJECTION
Status: DISCONTINUED | OUTPATIENT
Start: 2022-03-03 | End: 2022-03-03 | Stop reason: HOSPADM

## 2022-03-03 RX ADMIN — HEPARIN 500 UNITS: 100 SYRINGE at 08:03

## 2022-03-03 RX ADMIN — Medication 10 ML: at 08:03

## 2022-03-03 NOTE — PLAN OF CARE
Pt arrived to unit. No new issues. Pt feeling good. Tolerated port flush. Pt given next appt. Pt ambulated off unit. No distress noted.

## 2022-04-07 ENCOUNTER — INFUSION (OUTPATIENT)
Dept: INFUSION THERAPY | Facility: HOSPITAL | Age: 77
End: 2022-04-07
Attending: INTERNAL MEDICINE
Payer: MEDICARE

## 2022-04-07 DIAGNOSIS — Z85.72 HISTORY OF NON-HODGKIN'S LYMPHOMA: Primary | ICD-10-CM

## 2022-04-07 PROCEDURE — A4216 STERILE WATER/SALINE, 10 ML: HCPCS | Performed by: INTERNAL MEDICINE

## 2022-04-07 PROCEDURE — 63600175 PHARM REV CODE 636 W HCPCS: Performed by: INTERNAL MEDICINE

## 2022-04-07 PROCEDURE — 96523 IRRIG DRUG DELIVERY DEVICE: CPT

## 2022-04-07 PROCEDURE — 25000003 PHARM REV CODE 250: Performed by: INTERNAL MEDICINE

## 2022-04-07 RX ORDER — SODIUM CHLORIDE 0.9 % (FLUSH) 0.9 %
10 SYRINGE (ML) INJECTION
Status: DISCONTINUED | OUTPATIENT
Start: 2022-04-07 | End: 2022-04-07 | Stop reason: HOSPADM

## 2022-04-07 RX ORDER — HEPARIN 100 UNIT/ML
500 SYRINGE INTRAVENOUS
Status: DISCONTINUED | OUTPATIENT
Start: 2022-04-07 | End: 2022-04-07 | Stop reason: HOSPADM

## 2022-04-07 RX ADMIN — Medication 10 ML: at 08:04

## 2022-04-07 RX ADMIN — HEPARIN 500 UNITS: 100 SYRINGE at 08:04

## 2022-04-07 NOTE — PLAN OF CARE
Pt received monthly PAC maintenance. PAC accessed and flushed without difficulty. Pt received discharge instructions along with follow up appointments. Ambulated off unit in [facility].

## 2022-05-12 ENCOUNTER — INFUSION (OUTPATIENT)
Dept: INFUSION THERAPY | Facility: HOSPITAL | Age: 77
End: 2022-05-12
Attending: INTERNAL MEDICINE
Payer: MEDICARE

## 2022-05-12 VITALS
RESPIRATION RATE: 16 BRPM | TEMPERATURE: 98 F | OXYGEN SATURATION: 97 % | DIASTOLIC BLOOD PRESSURE: 70 MMHG | HEART RATE: 86 BPM | SYSTOLIC BLOOD PRESSURE: 152 MMHG

## 2022-05-12 DIAGNOSIS — Z85.72 HISTORY OF NON-HODGKIN'S LYMPHOMA: Primary | ICD-10-CM

## 2022-05-12 PROCEDURE — 96523 IRRIG DRUG DELIVERY DEVICE: CPT

## 2022-05-12 PROCEDURE — A4216 STERILE WATER/SALINE, 10 ML: HCPCS | Performed by: INTERNAL MEDICINE

## 2022-05-12 PROCEDURE — 63600175 PHARM REV CODE 636 W HCPCS: Performed by: INTERNAL MEDICINE

## 2022-05-12 PROCEDURE — 25000003 PHARM REV CODE 250: Performed by: INTERNAL MEDICINE

## 2022-05-12 RX ORDER — HEPARIN 100 UNIT/ML
500 SYRINGE INTRAVENOUS
Status: DISCONTINUED | OUTPATIENT
Start: 2022-05-12 | End: 2022-05-12 | Stop reason: HOSPADM

## 2022-05-12 RX ORDER — SODIUM CHLORIDE 0.9 % (FLUSH) 0.9 %
10 SYRINGE (ML) INJECTION
Status: DISCONTINUED | OUTPATIENT
Start: 2022-05-12 | End: 2022-05-12 | Stop reason: HOSPADM

## 2022-05-12 RX ADMIN — Medication 10 ML: at 08:05

## 2022-05-12 RX ADMIN — HEPARIN 500 UNITS: 100 SYRINGE at 08:05

## 2022-05-12 NOTE — PLAN OF CARE
Pt received monthly PAC maintenance. PAC accessed and flushed without difficulty. Pt received discharge instructions along with follow up appointments. Ambulated off unit in [facility].

## 2022-06-09 ENCOUNTER — INFUSION (OUTPATIENT)
Dept: INFUSION THERAPY | Facility: HOSPITAL | Age: 77
End: 2022-06-09
Attending: INTERNAL MEDICINE
Payer: MEDICARE

## 2022-06-09 VITALS
OXYGEN SATURATION: 97 % | RESPIRATION RATE: 16 BRPM | HEART RATE: 88 BPM | SYSTOLIC BLOOD PRESSURE: 122 MMHG | TEMPERATURE: 99 F | DIASTOLIC BLOOD PRESSURE: 59 MMHG

## 2022-06-09 DIAGNOSIS — Z85.72 HISTORY OF NON-HODGKIN'S LYMPHOMA: Primary | ICD-10-CM

## 2022-06-09 PROCEDURE — A4216 STERILE WATER/SALINE, 10 ML: HCPCS | Performed by: INTERNAL MEDICINE

## 2022-06-09 PROCEDURE — 63600175 PHARM REV CODE 636 W HCPCS: Performed by: INTERNAL MEDICINE

## 2022-06-09 PROCEDURE — 96523 IRRIG DRUG DELIVERY DEVICE: CPT

## 2022-06-09 PROCEDURE — 25000003 PHARM REV CODE 250: Performed by: INTERNAL MEDICINE

## 2022-06-09 RX ORDER — SODIUM CHLORIDE 0.9 % (FLUSH) 0.9 %
10 SYRINGE (ML) INJECTION
Status: DISCONTINUED | OUTPATIENT
Start: 2022-06-09 | End: 2022-06-09 | Stop reason: HOSPADM

## 2022-06-09 RX ORDER — HEPARIN 100 UNIT/ML
500 SYRINGE INTRAVENOUS
Status: DISCONTINUED | OUTPATIENT
Start: 2022-06-09 | End: 2022-06-09 | Stop reason: HOSPADM

## 2022-06-09 RX ADMIN — HEPARIN 500 UNITS: 100 SYRINGE at 08:06

## 2022-06-09 RX ADMIN — Medication 10 ML: at 08:06

## 2022-06-09 NOTE — PLAN OF CARE
Pt received monthly PAC maintenance. PAC accessed and flushed without difficulty. Pt received discharge instructions along with follow up appointments. Ambulated off unit in [facility].

## 2022-07-07 ENCOUNTER — INFUSION (OUTPATIENT)
Dept: INFUSION THERAPY | Facility: HOSPITAL | Age: 77
End: 2022-07-07
Attending: INTERNAL MEDICINE
Payer: MEDICARE

## 2022-07-07 VITALS
RESPIRATION RATE: 16 BRPM | OXYGEN SATURATION: 98 % | HEART RATE: 91 BPM | TEMPERATURE: 99 F | DIASTOLIC BLOOD PRESSURE: 66 MMHG | SYSTOLIC BLOOD PRESSURE: 138 MMHG

## 2022-07-07 DIAGNOSIS — Z85.72 HISTORY OF NON-HODGKIN'S LYMPHOMA: Primary | ICD-10-CM

## 2022-07-07 PROCEDURE — 25000003 PHARM REV CODE 250: Performed by: INTERNAL MEDICINE

## 2022-07-07 PROCEDURE — A4216 STERILE WATER/SALINE, 10 ML: HCPCS | Performed by: INTERNAL MEDICINE

## 2022-07-07 PROCEDURE — 63600175 PHARM REV CODE 636 W HCPCS: Performed by: INTERNAL MEDICINE

## 2022-07-07 PROCEDURE — 96523 IRRIG DRUG DELIVERY DEVICE: CPT

## 2022-07-07 RX ORDER — HEPARIN 100 UNIT/ML
500 SYRINGE INTRAVENOUS
Status: DISCONTINUED | OUTPATIENT
Start: 2022-07-07 | End: 2022-07-07 | Stop reason: HOSPADM

## 2022-07-07 RX ORDER — SODIUM CHLORIDE 0.9 % (FLUSH) 0.9 %
10 SYRINGE (ML) INJECTION
Status: DISCONTINUED | OUTPATIENT
Start: 2022-07-07 | End: 2022-07-07 | Stop reason: HOSPADM

## 2022-07-07 RX ADMIN — Medication 10 ML: at 08:07

## 2022-07-07 RX ADMIN — HEPARIN 500 UNITS: 100 SYRINGE at 08:07

## 2022-07-07 NOTE — PLAN OF CARE
Pt received monthly PAC maintenance. PAC accessed and flushed without difficulty. Pt received discharge instructions along with follow up appointments. Ambulated off unit in [facility].

## 2022-08-04 ENCOUNTER — INFUSION (OUTPATIENT)
Dept: INFUSION THERAPY | Facility: HOSPITAL | Age: 77
End: 2022-08-04
Attending: INTERNAL MEDICINE
Payer: MEDICARE

## 2022-08-04 VITALS
RESPIRATION RATE: 16 BRPM | SYSTOLIC BLOOD PRESSURE: 138 MMHG | HEART RATE: 62 BPM | OXYGEN SATURATION: 98 % | TEMPERATURE: 99 F | DIASTOLIC BLOOD PRESSURE: 71 MMHG

## 2022-08-04 DIAGNOSIS — Z85.72 HISTORY OF NON-HODGKIN'S LYMPHOMA: Primary | ICD-10-CM

## 2022-08-04 PROCEDURE — A4216 STERILE WATER/SALINE, 10 ML: HCPCS | Performed by: INTERNAL MEDICINE

## 2022-08-04 PROCEDURE — 25000003 PHARM REV CODE 250: Performed by: INTERNAL MEDICINE

## 2022-08-04 PROCEDURE — 63600175 PHARM REV CODE 636 W HCPCS: Performed by: INTERNAL MEDICINE

## 2022-08-04 PROCEDURE — 96523 IRRIG DRUG DELIVERY DEVICE: CPT

## 2022-08-04 RX ORDER — HEPARIN 100 UNIT/ML
500 SYRINGE INTRAVENOUS
Status: DISCONTINUED | OUTPATIENT
Start: 2022-08-04 | End: 2022-08-04 | Stop reason: HOSPADM

## 2022-08-04 RX ORDER — SODIUM CHLORIDE 0.9 % (FLUSH) 0.9 %
10 SYRINGE (ML) INJECTION
Status: DISCONTINUED | OUTPATIENT
Start: 2022-08-04 | End: 2022-08-04 | Stop reason: HOSPADM

## 2022-08-04 RX ADMIN — Medication 10 ML: at 08:08

## 2022-08-04 RX ADMIN — HEPARIN 500 UNITS: 100 SYRINGE at 08:08

## 2022-08-04 NOTE — PLAN OF CARE
Pt arrived to unit. No new issues noted. Tolerated port flush. Pt has next appts. Pt ambulated off unit. No distress noted.

## 2022-09-01 ENCOUNTER — INFUSION (OUTPATIENT)
Dept: INFUSION THERAPY | Facility: HOSPITAL | Age: 77
End: 2022-09-01
Attending: INTERNAL MEDICINE
Payer: MEDICARE

## 2022-09-01 DIAGNOSIS — Z85.72 HISTORY OF NON-HODGKIN'S LYMPHOMA: Primary | ICD-10-CM

## 2022-09-01 PROCEDURE — 96523 IRRIG DRUG DELIVERY DEVICE: CPT

## 2022-09-01 RX ORDER — SODIUM CHLORIDE 0.9 % (FLUSH) 0.9 %
10 SYRINGE (ML) INJECTION
Status: DISCONTINUED | OUTPATIENT
Start: 2022-09-01 | End: 2022-09-01 | Stop reason: HOSPADM

## 2022-09-01 RX ORDER — HEPARIN 100 UNIT/ML
500 SYRINGE INTRAVENOUS
Status: DISCONTINUED | OUTPATIENT
Start: 2022-09-01 | End: 2022-09-01 | Stop reason: HOSPADM

## 2022-09-01 NOTE — PLAN OF CARE
Pt arrived to unit. No new issues. Tolerated port flush. Pt has next appts. Pt ambulated off unit. No distress noted.

## 2022-09-29 ENCOUNTER — INFUSION (OUTPATIENT)
Dept: INFUSION THERAPY | Facility: HOSPITAL | Age: 77
End: 2022-09-29
Attending: INTERNAL MEDICINE
Payer: MEDICARE

## 2022-09-29 VITALS
HEART RATE: 87 BPM | SYSTOLIC BLOOD PRESSURE: 146 MMHG | TEMPERATURE: 98 F | RESPIRATION RATE: 18 BRPM | DIASTOLIC BLOOD PRESSURE: 79 MMHG | OXYGEN SATURATION: 99 %

## 2022-09-29 DIAGNOSIS — Z85.72 HISTORY OF NON-HODGKIN'S LYMPHOMA: Primary | ICD-10-CM

## 2022-09-29 PROCEDURE — 63600175 PHARM REV CODE 636 W HCPCS: Performed by: INTERNAL MEDICINE

## 2022-09-29 PROCEDURE — A4216 STERILE WATER/SALINE, 10 ML: HCPCS | Performed by: INTERNAL MEDICINE

## 2022-09-29 PROCEDURE — 96523 IRRIG DRUG DELIVERY DEVICE: CPT

## 2022-09-29 PROCEDURE — 25000003 PHARM REV CODE 250: Performed by: INTERNAL MEDICINE

## 2022-09-29 RX ORDER — HEPARIN 100 UNIT/ML
500 SYRINGE INTRAVENOUS
Status: DISCONTINUED | OUTPATIENT
Start: 2022-09-29 | End: 2022-09-29 | Stop reason: HOSPADM

## 2022-09-29 RX ORDER — SODIUM CHLORIDE 0.9 % (FLUSH) 0.9 %
10 SYRINGE (ML) INJECTION
Status: DISCONTINUED | OUTPATIENT
Start: 2022-09-29 | End: 2022-09-29 | Stop reason: HOSPADM

## 2022-09-29 RX ADMIN — Medication 10 ML: at 08:09

## 2022-09-29 RX ADMIN — HEPARIN 500 UNITS: 100 SYRINGE at 08:09

## 2022-09-29 NOTE — PLAN OF CARE
Pt tolerated monthly flush well.  No acute distress noted. Patient ambulating without difficulty.  Appears to be in good spirits.

## 2022-10-27 ENCOUNTER — INFUSION (OUTPATIENT)
Dept: INFUSION THERAPY | Facility: HOSPITAL | Age: 77
End: 2022-10-27
Attending: INTERNAL MEDICINE
Payer: MEDICARE

## 2022-10-27 VITALS
TEMPERATURE: 98 F | HEART RATE: 88 BPM | DIASTOLIC BLOOD PRESSURE: 72 MMHG | RESPIRATION RATE: 17 BRPM | SYSTOLIC BLOOD PRESSURE: 145 MMHG | OXYGEN SATURATION: 99 %

## 2022-10-27 DIAGNOSIS — Z85.72 HISTORY OF NON-HODGKIN'S LYMPHOMA: Primary | ICD-10-CM

## 2022-10-27 PROCEDURE — A4216 STERILE WATER/SALINE, 10 ML: HCPCS | Performed by: INTERNAL MEDICINE

## 2022-10-27 PROCEDURE — 63600175 PHARM REV CODE 636 W HCPCS: Performed by: INTERNAL MEDICINE

## 2022-10-27 PROCEDURE — 96523 IRRIG DRUG DELIVERY DEVICE: CPT

## 2022-10-27 PROCEDURE — 25000003 PHARM REV CODE 250: Performed by: INTERNAL MEDICINE

## 2022-10-27 RX ORDER — HEPARIN 100 UNIT/ML
500 SYRINGE INTRAVENOUS
Status: DISCONTINUED | OUTPATIENT
Start: 2022-10-27 | End: 2022-10-27 | Stop reason: HOSPADM

## 2022-10-27 RX ORDER — SODIUM CHLORIDE 0.9 % (FLUSH) 0.9 %
10 SYRINGE (ML) INJECTION
Status: DISCONTINUED | OUTPATIENT
Start: 2022-10-27 | End: 2022-10-27 | Stop reason: HOSPADM

## 2022-10-27 RX ADMIN — Medication 10 ML: at 08:10

## 2022-10-27 RX ADMIN — HEPARIN 500 UNITS: 100 SYRINGE at 08:10

## 2022-10-27 NOTE — PLAN OF CARE
Pt tolerated port flush. + blood return noted. Flushes easily. Heparin administered to dwell. Pt has future appts. Pt left unassisted by self in NAD.

## 2022-12-22 ENCOUNTER — INFUSION (OUTPATIENT)
Dept: INFUSION THERAPY | Facility: HOSPITAL | Age: 77
End: 2022-12-22
Attending: INTERNAL MEDICINE
Payer: MEDICARE

## 2022-12-22 VITALS
SYSTOLIC BLOOD PRESSURE: 160 MMHG | RESPIRATION RATE: 16 BRPM | OXYGEN SATURATION: 100 % | DIASTOLIC BLOOD PRESSURE: 81 MMHG | HEART RATE: 88 BPM | TEMPERATURE: 98 F

## 2022-12-22 DIAGNOSIS — Z85.72 HISTORY OF NON-HODGKIN'S LYMPHOMA: Primary | ICD-10-CM

## 2022-12-22 PROCEDURE — 25000003 PHARM REV CODE 250: Performed by: INTERNAL MEDICINE

## 2022-12-22 PROCEDURE — 96523 IRRIG DRUG DELIVERY DEVICE: CPT

## 2022-12-22 PROCEDURE — A4216 STERILE WATER/SALINE, 10 ML: HCPCS | Performed by: INTERNAL MEDICINE

## 2022-12-22 PROCEDURE — 63600175 PHARM REV CODE 636 W HCPCS: Performed by: INTERNAL MEDICINE

## 2022-12-22 RX ORDER — SODIUM CHLORIDE 0.9 % (FLUSH) 0.9 %
10 SYRINGE (ML) INJECTION
Status: DISCONTINUED | OUTPATIENT
Start: 2022-12-22 | End: 2022-12-22 | Stop reason: HOSPADM

## 2022-12-22 RX ORDER — HEPARIN 100 UNIT/ML
500 SYRINGE INTRAVENOUS
Status: DISCONTINUED | OUTPATIENT
Start: 2022-12-22 | End: 2022-12-22 | Stop reason: HOSPADM

## 2022-12-22 RX ADMIN — Medication 10 ML: at 08:12

## 2022-12-22 RX ADMIN — HEPARIN 500 UNITS: 100 SYRINGE at 08:12

## 2022-12-22 NOTE — PLAN OF CARE
Patient arrived to unit for maintenance port flush. Patient denied any new or worsening symptoms. Port accessed, flushed, blood return noted, heparin locked, and de-accessed. Needle intact. Patient discharged in no signs of acute distress. Patient given next appt.

## 2022-12-22 NOTE — PLAN OF CARE
Problem: Infection  Goal: Absence of Infection Signs and Symptoms  12/22/2022 1636 by Candy Lujan RN  Outcome: Ongoing, Progressing  12/22/2022 1617 by Candy Lujan RN  Outcome: Ongoing, Progressing

## 2023-01-19 ENCOUNTER — INFUSION (OUTPATIENT)
Dept: INFUSION THERAPY | Facility: HOSPITAL | Age: 78
End: 2023-01-19
Attending: INTERNAL MEDICINE
Payer: MEDICARE

## 2023-01-19 VITALS
OXYGEN SATURATION: 98 % | TEMPERATURE: 98 F | SYSTOLIC BLOOD PRESSURE: 163 MMHG | DIASTOLIC BLOOD PRESSURE: 83 MMHG | RESPIRATION RATE: 16 BRPM | HEART RATE: 83 BPM

## 2023-01-19 DIAGNOSIS — Z85.72 HISTORY OF NON-HODGKIN'S LYMPHOMA: Primary | ICD-10-CM

## 2023-01-19 PROCEDURE — 25000003 PHARM REV CODE 250: Performed by: INTERNAL MEDICINE

## 2023-01-19 PROCEDURE — 63600175 PHARM REV CODE 636 W HCPCS: Performed by: INTERNAL MEDICINE

## 2023-01-19 PROCEDURE — 96523 IRRIG DRUG DELIVERY DEVICE: CPT

## 2023-01-19 PROCEDURE — A4216 STERILE WATER/SALINE, 10 ML: HCPCS | Performed by: INTERNAL MEDICINE

## 2023-01-19 RX ORDER — HEPARIN 100 UNIT/ML
500 SYRINGE INTRAVENOUS
Status: DISCONTINUED | OUTPATIENT
Start: 2023-01-19 | End: 2023-01-19 | Stop reason: HOSPADM

## 2023-01-19 RX ORDER — SODIUM CHLORIDE 0.9 % (FLUSH) 0.9 %
10 SYRINGE (ML) INJECTION
Status: DISCONTINUED | OUTPATIENT
Start: 2023-01-19 | End: 2023-01-19 | Stop reason: HOSPADM

## 2023-01-19 RX ADMIN — Medication 10 ML: at 08:01

## 2023-01-19 RX ADMIN — HEPARIN 500 UNITS: 100 SYRINGE at 08:01

## 2023-02-16 ENCOUNTER — INFUSION (OUTPATIENT)
Dept: INFUSION THERAPY | Facility: HOSPITAL | Age: 78
End: 2023-02-16
Attending: INTERNAL MEDICINE
Payer: MEDICARE

## 2023-02-16 VITALS
RESPIRATION RATE: 16 BRPM | OXYGEN SATURATION: 99 % | DIASTOLIC BLOOD PRESSURE: 71 MMHG | SYSTOLIC BLOOD PRESSURE: 139 MMHG | TEMPERATURE: 98 F | HEART RATE: 96 BPM

## 2023-02-16 DIAGNOSIS — Z85.72 HISTORY OF NON-HODGKIN'S LYMPHOMA: Primary | ICD-10-CM

## 2023-02-16 PROCEDURE — 63600175 PHARM REV CODE 636 W HCPCS: Performed by: INTERNAL MEDICINE

## 2023-02-16 PROCEDURE — 96523 IRRIG DRUG DELIVERY DEVICE: CPT

## 2023-02-16 PROCEDURE — 25000003 PHARM REV CODE 250: Performed by: INTERNAL MEDICINE

## 2023-02-16 PROCEDURE — A4216 STERILE WATER/SALINE, 10 ML: HCPCS | Performed by: INTERNAL MEDICINE

## 2023-02-16 RX ORDER — SODIUM CHLORIDE 0.9 % (FLUSH) 0.9 %
10 SYRINGE (ML) INJECTION
Status: DISCONTINUED | OUTPATIENT
Start: 2023-02-16 | End: 2023-02-16 | Stop reason: HOSPADM

## 2023-02-16 RX ORDER — HEPARIN 100 UNIT/ML
500 SYRINGE INTRAVENOUS
Status: DISCONTINUED | OUTPATIENT
Start: 2023-02-16 | End: 2023-02-16 | Stop reason: HOSPADM

## 2023-02-16 RX ADMIN — HEPARIN 500 UNITS: 100 SYRINGE at 08:02

## 2023-02-16 RX ADMIN — Medication 10 ML: at 08:02

## 2023-03-16 ENCOUNTER — INFUSION (OUTPATIENT)
Dept: INFUSION THERAPY | Facility: HOSPITAL | Age: 78
End: 2023-03-16
Attending: INTERNAL MEDICINE
Payer: MEDICARE

## 2023-03-16 VITALS
SYSTOLIC BLOOD PRESSURE: 141 MMHG | OXYGEN SATURATION: 99 % | RESPIRATION RATE: 16 BRPM | DIASTOLIC BLOOD PRESSURE: 82 MMHG | HEART RATE: 101 BPM | TEMPERATURE: 98 F

## 2023-03-16 DIAGNOSIS — Z85.72 HISTORY OF NON-HODGKIN'S LYMPHOMA: Primary | ICD-10-CM

## 2023-03-16 PROCEDURE — 63600175 PHARM REV CODE 636 W HCPCS: Performed by: INTERNAL MEDICINE

## 2023-03-16 PROCEDURE — 25000003 PHARM REV CODE 250: Performed by: INTERNAL MEDICINE

## 2023-03-16 PROCEDURE — A4216 STERILE WATER/SALINE, 10 ML: HCPCS | Performed by: INTERNAL MEDICINE

## 2023-03-16 PROCEDURE — 96523 IRRIG DRUG DELIVERY DEVICE: CPT

## 2023-03-16 RX ORDER — HEPARIN 100 UNIT/ML
500 SYRINGE INTRAVENOUS
Status: DISCONTINUED | OUTPATIENT
Start: 2023-03-16 | End: 2023-03-16 | Stop reason: HOSPADM

## 2023-03-16 RX ORDER — SODIUM CHLORIDE 0.9 % (FLUSH) 0.9 %
10 SYRINGE (ML) INJECTION
Status: DISCONTINUED | OUTPATIENT
Start: 2023-03-16 | End: 2023-03-16 | Stop reason: HOSPADM

## 2023-03-16 RX ADMIN — Medication 10 ML: at 08:03

## 2023-03-16 RX ADMIN — HEPARIN 500 UNITS: 100 SYRINGE at 08:03

## 2023-03-16 NOTE — NURSING
0830 Patient tolerated port flush. Port accessed, flushed and heparin locked. Port deaccessed and abandaid to port site. He declined the AVS.

## 2023-04-05 ENCOUNTER — OFFICE VISIT (OUTPATIENT)
Dept: UROLOGY | Facility: CLINIC | Age: 78
End: 2023-04-05
Payer: MEDICARE

## 2023-04-05 VITALS — BODY MASS INDEX: 24.52 KG/M2 | WEIGHT: 170.88 LBS

## 2023-04-05 DIAGNOSIS — C61 PROSTATE CANCER: Primary | ICD-10-CM

## 2023-04-05 PROCEDURE — 1159F PR MEDICATION LIST DOCUMENTED IN MEDICAL RECORD: ICD-10-PCS | Mod: CPTII,S$GLB,, | Performed by: STUDENT IN AN ORGANIZED HEALTH CARE EDUCATION/TRAINING PROGRAM

## 2023-04-05 PROCEDURE — 1126F PR PAIN SEVERITY QUANTIFIED, NO PAIN PRESENT: ICD-10-PCS | Mod: CPTII,S$GLB,, | Performed by: STUDENT IN AN ORGANIZED HEALTH CARE EDUCATION/TRAINING PROGRAM

## 2023-04-05 PROCEDURE — 1160F PR REVIEW ALL MEDS BY PRESCRIBER/CLIN PHARMACIST DOCUMENTED: ICD-10-PCS | Mod: CPTII,S$GLB,, | Performed by: STUDENT IN AN ORGANIZED HEALTH CARE EDUCATION/TRAINING PROGRAM

## 2023-04-05 PROCEDURE — 99999 PR PBB SHADOW E&M-EST. PATIENT-LVL III: ICD-10-PCS | Mod: PBBFAC,,, | Performed by: STUDENT IN AN ORGANIZED HEALTH CARE EDUCATION/TRAINING PROGRAM

## 2023-04-05 PROCEDURE — 1160F RVW MEDS BY RX/DR IN RCRD: CPT | Mod: CPTII,S$GLB,, | Performed by: STUDENT IN AN ORGANIZED HEALTH CARE EDUCATION/TRAINING PROGRAM

## 2023-04-05 PROCEDURE — 99214 OFFICE O/P EST MOD 30 MIN: CPT | Mod: S$GLB,,, | Performed by: STUDENT IN AN ORGANIZED HEALTH CARE EDUCATION/TRAINING PROGRAM

## 2023-04-05 PROCEDURE — 99214 PR OFFICE/OUTPT VISIT, EST, LEVL IV, 30-39 MIN: ICD-10-PCS | Mod: S$GLB,,, | Performed by: STUDENT IN AN ORGANIZED HEALTH CARE EDUCATION/TRAINING PROGRAM

## 2023-04-05 PROCEDURE — 1126F AMNT PAIN NOTED NONE PRSNT: CPT | Mod: CPTII,S$GLB,, | Performed by: STUDENT IN AN ORGANIZED HEALTH CARE EDUCATION/TRAINING PROGRAM

## 2023-04-05 PROCEDURE — 99999 PR PBB SHADOW E&M-EST. PATIENT-LVL III: CPT | Mod: PBBFAC,,, | Performed by: STUDENT IN AN ORGANIZED HEALTH CARE EDUCATION/TRAINING PROGRAM

## 2023-04-05 PROCEDURE — 1157F ADVNC CARE PLAN IN RCRD: CPT | Mod: CPTII,S$GLB,, | Performed by: STUDENT IN AN ORGANIZED HEALTH CARE EDUCATION/TRAINING PROGRAM

## 2023-04-05 PROCEDURE — 1157F PR ADVANCE CARE PLAN OR EQUIV PRESENT IN MEDICAL RECORD: ICD-10-PCS | Mod: CPTII,S$GLB,, | Performed by: STUDENT IN AN ORGANIZED HEALTH CARE EDUCATION/TRAINING PROGRAM

## 2023-04-05 PROCEDURE — 1159F MED LIST DOCD IN RCRD: CPT | Mod: CPTII,S$GLB,, | Performed by: STUDENT IN AN ORGANIZED HEALTH CARE EDUCATION/TRAINING PROGRAM

## 2023-04-05 NOTE — LETTER
April 5, 2023        Richard Weems MD  [address] [facility]  [address]  [facility]  [place] [address]             [facility] - Urology  [address]  [place] [address]  Phone: [phone]  Fax: [phone]   Patient: Long Najera   MR Number: [id number]   YOB: 1945   Date of Visit: 4/5/2023       Dear Dr. Weems:    Thank you for referring Long Najera to me for evaluation. Attached you will find relevant portions of my assessment and plan of care.    Patient with prostate cancer since 2021, after review of options he declined treatment and has elected for active surveillance, he has not followed up every 6 months as previously discussed. An MRI and PSA have been ordered.     Sincerely,      David Simmons MD            CC    No Recipients    Enclosure

## 2023-04-05 NOTE — PROGRESS NOTES
Patient ID: Long Najera is a 77 y.o. male.    Chief Complaint: Other (Prostate cancer)      HPI  77 y.o. who presents to the Urology clinic for evaluation of prostate cancer 336 disease in 2021, he has opted for active surveillance after meeting with radiation oncology. Patient is overdue for active surveillance. PSA per PCP office  referral ( 7, no date of lab attached, last seen March 2023). Patient declines difficulty voiding while on flomax. Patient denies unexplained weight loss, fevers, chills, chest pain, shortness of breath.   Medically Necessary ROS documented in HPI    Past Medical History  Active Ambulatory Problems     Diagnosis Date Noted    ED (erectile dysfunction) 11/27/2012    History of non-Hodgkin's lymphoma 12/18/2012    Gynecomastia, male 02/17/2014    Essential hypertension, benign 05/27/2014    NHL (non-Hodgkin's lymphoma) 06/16/2014    Hypercholesterolemia 11/25/2015    Body mass index (BMI) of 24.0-24.9 in adult 11/25/2015    BMI 25.0-25.9,adult 02/03/2017    Elevated PSA 02/03/2017    Benign non-nodular prostatic hyperplasia with lower urinary tract symptoms 05/09/2017    Hyperlipidemia LDL goal <130 08/28/2017    HTN, goal below 140/90 08/28/2017    Lymphoma in remission 02/28/2018    Skin lesions 02/28/2018    Flu vaccine need 02/28/2018    Iron deficiency anemia 04/26/2021    Prostate cancer 06/07/2021     Resolved Ambulatory Problems     Diagnosis Date Noted    Leukopenia 12/18/2012    Contact dermatitis 02/17/2014     Past Medical History:   Diagnosis Date    Asthma 30+ years ago    Cancer 1992    History of colon polyps     Hypercholesteremia     Hypertension     Lymphoma          Past Surgical History  Past Surgical History:   Procedure Laterality Date    axilla biopsy  4/15/2013    axillary lymph node removal  10/11/3013    COLONOSCOPY      COLONOSCOPY N/A 4/26/2021    Procedure: COLONOSCOPY;  Surgeon: Joao Patel MD;  Location: [facility] ([address] [place] [address]);  Service:  Endoscopy;  Laterality: N/A;  COVID screening 4/23 [facility]  EGD with  at 7:00am    ESOPHAGOGASTRODUODENOSCOPY N/A 4/26/2021    Procedure: EGD (ESOPHAGOGASTRODUODENOSCOPY) with gi;  Surgeon: Duane Garza MD;  Location: [address]);  Service: Endoscopy;  Laterality: N/A;  colonoscopy to follow with     [facility] PLACEMENT  05/08/2013    TONSILLECTOMY, ADENOIDECTOMY         Social History  Social Connections: Not on file       Medications    Current Outpatient Medications:     acetaminophen (TYLENOL) 650 MG TbSR, Take 1 tablet (650 mg total) by mouth every 8 (eight) hours as needed., Disp: 30 tablet, Rfl: 0    aspirin (ECOTRIN) 81 MG EC tablet, Take 1 tablet (81 mg total) by mouth once daily., Disp: 150 tablet, Rfl: 2    atorvastatin (LIPITOR) 40 MG tablet, Take 1 tablet (40 mg total) by mouth once daily., Disp: 90 tablet, Rfl: 3    diclofenac sodium (VOLTAREN) 1 % Gel, Apply 2 g topically 4 (four) times daily as needed., Disp: 100 g, Rfl: 0    fluocinonide 0.05% (LIDEX) 0.05 % cream, Apply topically 2 (two) times daily. for 7 days, Disp: 120 g, Rfl: 0    lisinopril-hydrochlorothiazide (PRINZIDE,ZESTORETIC) 10-12.5 mg per tablet, Take 1 tablet by mouth once daily., Disp: 90 tablet, Rfl: 4    mupirocin (BACTROBAN) 2 % ointment, Apply to affected area 3 times daily, Disp: 22 g, Rfl: 1    sildenafil (VIAGRA) 100 MG tablet, Take 0.5 tablets (50 mg total) by mouth daily as needed., Disp: 10 tablet, Rfl: 11    sodium,potassium,mag sulfates (SUPREP BOWEL PREP KIT) 17.5-3.13-1.6 gram SolR, As Directed, Disp: 1 Bottle, Rfl: 0    Allergies  Review of patient's allergies indicates:   Allergen Reactions    Lactose Other (See Comments)     Pain in stomach if drink milk    Tylenol-codeine [acetaminophen-codeine] Nausea Only       Patient's PMH, FH, Social hx, Medications, allergies reviewed and updated as pertinent to today's visit    Objective:      Physical Exam  Constitutional:       General: He  is not in acute distress.     Appearance: He is well-developed. He is not ill-appearing, toxic-appearing or diaphoretic.   HENT:      Head: Normocephalic and atraumatic.      Mouth/Throat:      Mouth: Mucous membranes are moist.   Eyes:      Conjunctiva/sclera: Conjunctivae normal.   Pulmonary:      Effort: Pulmonary effort is normal. No respiratory distress.   Abdominal:      General: There is no distension.      Palpations: Abdomen is soft. There is no mass.      Tenderness: There is no abdominal tenderness. There is no right CVA tenderness, left CVA tenderness or guarding.   Musculoskeletal:         General: No swelling or deformity.      Cervical back: Neck supple.   Skin:     General: Skin is warm.      Findings: No rash.   Neurological:      Mental Status: He is alert and oriented to person, place, and time.      Gait: Gait normal.   Psychiatric:         Mood and Affect: Mood normal.         Thought Content: Thought content normal.         Judgment: Judgment normal.           Lab Results   Component Value Date    PSADIAG 4.6 (H) 05/04/2017    PSADIAG 4.7 (H) 01/27/2017    PSADIAG 4.0 12/04/2012      Assessment:       1. Prostate cancer        Plan:         Prostate cancer, low risk, on active surveillance  Discussed difference between watchful waiting  Patient due for updated PSA - will request records from Dr. Weems's office. MRI of prostate indicated given prior bx, plan to target clinically significant lesions.   Wife and patient VU

## 2023-04-06 ENCOUNTER — LAB VISIT (OUTPATIENT)
Dept: LAB | Facility: HOSPITAL | Age: 78
End: 2023-04-06
Attending: STUDENT IN AN ORGANIZED HEALTH CARE EDUCATION/TRAINING PROGRAM
Payer: MEDICARE

## 2023-04-06 DIAGNOSIS — C61 PROSTATE CANCER: ICD-10-CM

## 2023-04-06 LAB
ANION GAP SERPL CALC-SCNC: 7 MMOL/L (ref 8–16)
BUN SERPL-MCNC: 16 MG/DL (ref 8–23)
CALCIUM SERPL-MCNC: 9.3 MG/DL (ref 8.7–10.5)
CHLORIDE SERPL-SCNC: 105 MMOL/L (ref 95–110)
CO2 SERPL-SCNC: 30 MMOL/L (ref 23–29)
COMPLEXED PSA SERPL-MCNC: 10.4 NG/ML (ref 0–4)
CREAT SERPL-MCNC: 1.1 MG/DL (ref 0.5–1.4)
EST. GFR  (NO RACE VARIABLE): >60 ML/MIN/1.73 M^2
GLUCOSE SERPL-MCNC: 98 MG/DL (ref 70–110)
POTASSIUM SERPL-SCNC: 4 MMOL/L (ref 3.5–5.1)
SODIUM SERPL-SCNC: 142 MMOL/L (ref 136–145)

## 2023-04-06 PROCEDURE — 36415 COLL VENOUS BLD VENIPUNCTURE: CPT | Performed by: STUDENT IN AN ORGANIZED HEALTH CARE EDUCATION/TRAINING PROGRAM

## 2023-04-06 PROCEDURE — 84153 ASSAY OF PSA TOTAL: CPT | Performed by: STUDENT IN AN ORGANIZED HEALTH CARE EDUCATION/TRAINING PROGRAM

## 2023-04-06 PROCEDURE — 80048 BASIC METABOLIC PNL TOTAL CA: CPT | Performed by: STUDENT IN AN ORGANIZED HEALTH CARE EDUCATION/TRAINING PROGRAM

## 2023-04-13 ENCOUNTER — INFUSION (OUTPATIENT)
Dept: INFUSION THERAPY | Facility: HOSPITAL | Age: 78
End: 2023-04-13
Attending: INTERNAL MEDICINE
Payer: MEDICARE

## 2023-04-13 VITALS
RESPIRATION RATE: 18 BRPM | SYSTOLIC BLOOD PRESSURE: 134 MMHG | OXYGEN SATURATION: 99 % | HEART RATE: 95 BPM | DIASTOLIC BLOOD PRESSURE: 65 MMHG | TEMPERATURE: 98 F

## 2023-04-13 DIAGNOSIS — Z85.72 HISTORY OF NON-HODGKIN'S LYMPHOMA: Primary | ICD-10-CM

## 2023-04-13 PROCEDURE — 25000003 PHARM REV CODE 250: Performed by: INTERNAL MEDICINE

## 2023-04-13 PROCEDURE — 63600175 PHARM REV CODE 636 W HCPCS: Performed by: INTERNAL MEDICINE

## 2023-04-13 PROCEDURE — 96523 IRRIG DRUG DELIVERY DEVICE: CPT

## 2023-04-13 PROCEDURE — A4216 STERILE WATER/SALINE, 10 ML: HCPCS | Performed by: INTERNAL MEDICINE

## 2023-04-13 RX ORDER — SODIUM CHLORIDE 0.9 % (FLUSH) 0.9 %
10 SYRINGE (ML) INJECTION
Status: DISCONTINUED | OUTPATIENT
Start: 2023-04-13 | End: 2023-04-13 | Stop reason: HOSPADM

## 2023-04-13 RX ORDER — HEPARIN 100 UNIT/ML
500 SYRINGE INTRAVENOUS
Status: DISCONTINUED | OUTPATIENT
Start: 2023-04-13 | End: 2023-04-13 | Stop reason: HOSPADM

## 2023-04-13 RX ADMIN — HEPARIN 500 UNITS: 100 SYRINGE at 08:04

## 2023-04-13 RX ADMIN — Medication 10 ML: at 08:04

## 2023-04-13 NOTE — PLAN OF CARE
Pt arrived to unit for his monthly maintenance PAC flush. Voices no new or worsening complaints today. VSS. PAC accessed and flushed without difficulty. Received discharge instructions along with his next upcoming appointments. Discharged from unit in NAD.

## 2023-05-01 ENCOUNTER — HOSPITAL ENCOUNTER (OUTPATIENT)
Dept: RADIOLOGY | Facility: HOSPITAL | Age: 78
Discharge: HOME | End: 2023-05-01
Attending: STUDENT IN AN ORGANIZED HEALTH CARE EDUCATION/TRAINING PROGRAM
Payer: MEDICARE

## 2023-05-01 DIAGNOSIS — C61 PROSTATE CANCER: ICD-10-CM

## 2023-05-01 PROCEDURE — 72197 MRI PROSTATE W W/O CONTRAST: ICD-10-PCS | Mod: 26,,, | Performed by: RADIOLOGY

## 2023-05-01 PROCEDURE — 72197 MRI PELVIS W/O & W/DYE: CPT | Mod: TC

## 2023-05-01 PROCEDURE — 72197 MRI PELVIS W/O & W/DYE: CPT | Mod: 26,,, | Performed by: RADIOLOGY

## 2023-05-01 PROCEDURE — 25500020 PHARM REV CODE 255: Performed by: STUDENT IN AN ORGANIZED HEALTH CARE EDUCATION/TRAINING PROGRAM

## 2023-05-01 PROCEDURE — A9585 GADOBUTROL INJECTION: HCPCS | Performed by: STUDENT IN AN ORGANIZED HEALTH CARE EDUCATION/TRAINING PROGRAM

## 2023-05-01 RX ORDER — GADOBUTROL 604.72 MG/ML
10 INJECTION INTRAVENOUS
Status: COMPLETED | OUTPATIENT
Start: 2023-05-01 | End: 2023-05-01

## 2023-05-01 RX ADMIN — GADOBUTROL 10 ML: 604.72 INJECTION INTRAVENOUS at 06:05

## 2023-05-02 ENCOUNTER — TELEPHONE (OUTPATIENT)
Dept: UROLOGY | Facility: CLINIC | Age: 78
End: 2023-05-02
Payer: MEDICARE

## 2023-05-02 NOTE — TELEPHONE ENCOUNTER
I spoke with the pt and an appointment has been scheduled.    ----- Message from David Simmons MD sent at 5/1/2023  9:13 PM CDT -----  please arrange follow up

## 2023-05-11 ENCOUNTER — INFUSION (OUTPATIENT)
Dept: INFUSION THERAPY | Facility: HOSPITAL | Age: 78
End: 2023-05-11
Attending: INTERNAL MEDICINE
Payer: MEDICARE

## 2023-05-11 VITALS
SYSTOLIC BLOOD PRESSURE: 139 MMHG | HEART RATE: 95 BPM | RESPIRATION RATE: 18 BRPM | TEMPERATURE: 98 F | OXYGEN SATURATION: 98 % | DIASTOLIC BLOOD PRESSURE: 74 MMHG

## 2023-05-11 DIAGNOSIS — Z85.72 HISTORY OF NON-HODGKIN'S LYMPHOMA: Primary | ICD-10-CM

## 2023-05-11 PROCEDURE — 25000003 PHARM REV CODE 250: Performed by: INTERNAL MEDICINE

## 2023-05-11 PROCEDURE — A4216 STERILE WATER/SALINE, 10 ML: HCPCS | Performed by: INTERNAL MEDICINE

## 2023-05-11 PROCEDURE — 63600175 PHARM REV CODE 636 W HCPCS: Performed by: INTERNAL MEDICINE

## 2023-05-11 PROCEDURE — 96523 IRRIG DRUG DELIVERY DEVICE: CPT

## 2023-05-11 RX ORDER — SODIUM CHLORIDE 0.9 % (FLUSH) 0.9 %
10 SYRINGE (ML) INJECTION
Status: DISCONTINUED | OUTPATIENT
Start: 2023-05-11 | End: 2023-05-11 | Stop reason: HOSPADM

## 2023-05-11 RX ORDER — HEPARIN 100 UNIT/ML
500 SYRINGE INTRAVENOUS
Status: CANCELLED | OUTPATIENT
Start: 2023-05-11

## 2023-05-11 RX ORDER — HEPARIN 100 UNIT/ML
500 SYRINGE INTRAVENOUS
Status: DISCONTINUED | OUTPATIENT
Start: 2023-05-11 | End: 2023-05-11 | Stop reason: HOSPADM

## 2023-05-11 RX ORDER — SODIUM CHLORIDE 0.9 % (FLUSH) 0.9 %
10 SYRINGE (ML) INJECTION
Status: CANCELLED | OUTPATIENT
Start: 2023-05-11

## 2023-05-11 RX ADMIN — HEPARIN 500 UNITS: 100 SYRINGE at 08:05

## 2023-05-11 RX ADMIN — Medication 10 ML: at 08:05

## 2023-05-24 ENCOUNTER — OFFICE VISIT (OUTPATIENT)
Dept: UROLOGY | Facility: CLINIC | Age: 78
End: 2023-05-24
Payer: MEDICARE

## 2023-05-24 VITALS — BODY MASS INDEX: 24.78 KG/M2 | WEIGHT: 172.75 LBS

## 2023-05-24 DIAGNOSIS — C61 PROSTATE CANCER: Primary | ICD-10-CM

## 2023-05-24 PROCEDURE — 1101F PT FALLS ASSESS-DOCD LE1/YR: CPT | Mod: CPTII,S$GLB,, | Performed by: STUDENT IN AN ORGANIZED HEALTH CARE EDUCATION/TRAINING PROGRAM

## 2023-05-24 PROCEDURE — 3288F PR FALLS RISK ASSESSMENT DOCUMENTED: ICD-10-PCS | Mod: CPTII,S$GLB,, | Performed by: STUDENT IN AN ORGANIZED HEALTH CARE EDUCATION/TRAINING PROGRAM

## 2023-05-24 PROCEDURE — 1157F PR ADVANCE CARE PLAN OR EQUIV PRESENT IN MEDICAL RECORD: ICD-10-PCS | Mod: CPTII,S$GLB,, | Performed by: STUDENT IN AN ORGANIZED HEALTH CARE EDUCATION/TRAINING PROGRAM

## 2023-05-24 PROCEDURE — 1160F RVW MEDS BY RX/DR IN RCRD: CPT | Mod: CPTII,S$GLB,, | Performed by: STUDENT IN AN ORGANIZED HEALTH CARE EDUCATION/TRAINING PROGRAM

## 2023-05-24 PROCEDURE — 99999 PR PBB SHADOW E&M-EST. PATIENT-LVL III: CPT | Mod: PBBFAC,,, | Performed by: STUDENT IN AN ORGANIZED HEALTH CARE EDUCATION/TRAINING PROGRAM

## 2023-05-24 PROCEDURE — 1157F ADVNC CARE PLAN IN RCRD: CPT | Mod: CPTII,S$GLB,, | Performed by: STUDENT IN AN ORGANIZED HEALTH CARE EDUCATION/TRAINING PROGRAM

## 2023-05-24 PROCEDURE — 3288F FALL RISK ASSESSMENT DOCD: CPT | Mod: CPTII,S$GLB,, | Performed by: STUDENT IN AN ORGANIZED HEALTH CARE EDUCATION/TRAINING PROGRAM

## 2023-05-24 PROCEDURE — 99999 PR PBB SHADOW E&M-EST. PATIENT-LVL III: ICD-10-PCS | Mod: PBBFAC,,, | Performed by: STUDENT IN AN ORGANIZED HEALTH CARE EDUCATION/TRAINING PROGRAM

## 2023-05-24 PROCEDURE — 1159F MED LIST DOCD IN RCRD: CPT | Mod: CPTII,S$GLB,, | Performed by: STUDENT IN AN ORGANIZED HEALTH CARE EDUCATION/TRAINING PROGRAM

## 2023-05-24 PROCEDURE — 99214 PR OFFICE/OUTPT VISIT, EST, LEVL IV, 30-39 MIN: ICD-10-PCS | Mod: S$GLB,,, | Performed by: STUDENT IN AN ORGANIZED HEALTH CARE EDUCATION/TRAINING PROGRAM

## 2023-05-24 PROCEDURE — 1159F PR MEDICATION LIST DOCUMENTED IN MEDICAL RECORD: ICD-10-PCS | Mod: CPTII,S$GLB,, | Performed by: STUDENT IN AN ORGANIZED HEALTH CARE EDUCATION/TRAINING PROGRAM

## 2023-05-24 PROCEDURE — 1160F PR REVIEW ALL MEDS BY PRESCRIBER/CLIN PHARMACIST DOCUMENTED: ICD-10-PCS | Mod: CPTII,S$GLB,, | Performed by: STUDENT IN AN ORGANIZED HEALTH CARE EDUCATION/TRAINING PROGRAM

## 2023-05-24 PROCEDURE — 1125F AMNT PAIN NOTED PAIN PRSNT: CPT | Mod: CPTII,S$GLB,, | Performed by: STUDENT IN AN ORGANIZED HEALTH CARE EDUCATION/TRAINING PROGRAM

## 2023-05-24 PROCEDURE — 1101F PR PT FALLS ASSESS DOC 0-1 FALLS W/OUT INJ PAST YR: ICD-10-PCS | Mod: CPTII,S$GLB,, | Performed by: STUDENT IN AN ORGANIZED HEALTH CARE EDUCATION/TRAINING PROGRAM

## 2023-05-24 PROCEDURE — 1125F PR PAIN SEVERITY QUANTIFIED, PAIN PRESENT: ICD-10-PCS | Mod: CPTII,S$GLB,, | Performed by: STUDENT IN AN ORGANIZED HEALTH CARE EDUCATION/TRAINING PROGRAM

## 2023-05-24 PROCEDURE — 99214 OFFICE O/P EST MOD 30 MIN: CPT | Mod: S$GLB,,, | Performed by: STUDENT IN AN ORGANIZED HEALTH CARE EDUCATION/TRAINING PROGRAM

## 2023-05-24 RX ORDER — DIAZEPAM 10 MG/1
10 TABLET ORAL
Qty: 1 TABLET | Refills: 0 | Status: SHIPPED | OUTPATIENT
Start: 2023-05-24 | End: 2023-08-04

## 2023-05-24 RX ORDER — ENEMA 19; 7 G/133ML; G/133ML
1 ENEMA RECTAL ONCE
Qty: 1 ENEMA | Refills: 0 | Status: SHIPPED | OUTPATIENT
Start: 2023-05-24 | End: 2023-05-24

## 2023-05-24 RX ORDER — CIPROFLOXACIN 500 MG/1
500 TABLET ORAL EVERY 12 HOURS
Qty: 6 TABLET | Refills: 0 | Status: SHIPPED | OUTPATIENT
Start: 2023-05-24 | End: 2023-05-27

## 2023-05-24 NOTE — PROGRESS NOTES
Patient ID: Long Najera is a 77 y.o. male.    Chief Complaint: Follow-up    HPI  77 y.o. who presents to the Urology clinic for evaluation of prostate cancer, low risk on active surveillance. Patient diagnosed 5/2021. Patient here to review MRI of prostate recently completed.     PSA 7.5 ( 2021)  10 ( 2023)  .     Medically Necessary ROS documented in HPI    Past Medical History  Active Ambulatory Problems     Diagnosis Date Noted    ED (erectile dysfunction) 11/27/2012    History of non-Hodgkin's lymphoma 12/18/2012    Gynecomastia, male 02/17/2014    Essential hypertension, benign 05/27/2014    NHL (non-Hodgkin's lymphoma) 06/16/2014    Hypercholesterolemia 11/25/2015    Body mass index (BMI) of 24.0-24.9 in adult 11/25/2015    BMI 25.0-25.9,adult 02/03/2017    Elevated PSA 02/03/2017    Benign non-nodular prostatic hyperplasia with lower urinary tract symptoms 05/09/2017    Hyperlipidemia LDL goal <130 08/28/2017    HTN, goal below 140/90 08/28/2017    Lymphoma in remission 02/28/2018    Skin lesions 02/28/2018    Flu vaccine need 02/28/2018    Iron deficiency anemia 04/26/2021    Prostate cancer 06/07/2021     Resolved Ambulatory Problems     Diagnosis Date Noted    Leukopenia 12/18/2012    Contact dermatitis 02/17/2014     Past Medical History:   Diagnosis Date    Asthma 30+ years ago    Cancer 1992    History of colon polyps     Hypercholesteremia     Hypertension     Lymphoma          Past Surgical History  Past Surgical History:   Procedure Laterality Date    axilla biopsy  4/15/2013    axillary lymph node removal  10/11/3013    COLONOSCOPY      COLONOSCOPY N/A 4/26/2021    Procedure: COLONOSCOPY;  Surgeon: Joao Patel MD;  Location: [address];  Service: Endoscopy;  Laterality: N/A;  COVID screening 4/23 [facility]  EGD with  at 7:00am    ESOPHAGOGASTRODUODENOSCOPY N/A 4/26/2021    Procedure: EGD (ESOPHAGOGASTRODUODENOSCOPY) with gi;  Surgeon: Duane Garza MD;   Location: [facility] ([facility]);  Service: Endoscopy;  Laterality: N/A;  colonoscopy to follow with Dr.Whitlow ZAMORA PLACEMENT  05/08/2013    TONSILLECTOMY, ADENOIDECTOMY         Social History  Social Connections: Not on file       Medications    Current Outpatient Medications:     acetaminophen (TYLENOL) 650 MG TbSR, Take 1 tablet (650 mg total) by mouth every 8 (eight) hours as needed., Disp: 30 tablet, Rfl: 0    aspirin (ECOTRIN) 81 MG EC tablet, Take 1 tablet (81 mg total) by mouth once daily., Disp: 150 tablet, Rfl: 2    atorvastatin (LIPITOR) 40 MG tablet, Take 1 tablet (40 mg total) by mouth once daily., Disp: 90 tablet, Rfl: 3    diclofenac sodium (VOLTAREN) 1 % Gel, Apply 2 g topically 4 (four) times daily as needed., Disp: 100 g, Rfl: 0    fluocinonide 0.05% (LIDEX) 0.05 % cream, Apply topically 2 (two) times daily. for 7 days, Disp: 120 g, Rfl: 0    lisinopril-hydrochlorothiazide (PRINZIDE,ZESTORETIC) 10-12.5 mg per tablet, Take 1 tablet by mouth once daily., Disp: 90 tablet, Rfl: 4    mupirocin (BACTROBAN) 2 % ointment, Apply to affected area 3 times daily, Disp: 22 g, Rfl: 1    sildenafil (VIAGRA) 100 MG tablet, Take 0.5 tablets (50 mg total) by mouth daily as needed., Disp: 10 tablet, Rfl: 11    sodium,potassium,mag sulfates (SUPREP BOWEL PREP KIT) 17.5-3.13-1.6 gram SolR, As Directed, Disp: 1 Bottle, Rfl: 0    Allergies  Review of patient's allergies indicates:   Allergen Reactions    Lactose Other (See Comments)     Pain in stomach if drink milk    Tylenol-codeine [acetaminophen-codeine] Nausea Only       Patient's PMH, FH, Social hx, Medications, allergies reviewed and updated as pertinent to today's visit    Objective:      Physical Exam  Constitutional:       General: He is not in acute distress.     Appearance: He is well-developed. He is not ill-appearing, toxic-appearing or diaphoretic.   HENT:      Head: Normocephalic and atraumatic.      Mouth/Throat:      Mouth: Mucous membranes are  moist.   Eyes:      Conjunctiva/sclera: Conjunctivae normal.   Pulmonary:      Effort: Pulmonary effort is normal. No respiratory distress.   Abdominal:      General: Abdomen is flat. There is no distension.   Musculoskeletal:         General: No swelling or deformity.      Cervical back: Neck supple.   Skin:     Findings: No rash.   Neurological:      Mental Status: He is alert and oriented to person, place, and time.   Psychiatric:         Mood and Affect: Mood normal.         Thought Content: Thought content normal.         Judgment: Judgment normal.           Lab Results   Component Value Date    PSADIAG 10.4 (H) 04/06/2023    PSADIAG 4.6 (H) 05/04/2017    PSADIAG 4.7 (H) 01/27/2017      Imaging results:     EXAMINATION:  MRI PROSTATE W W/O CONTRAST     CLINICAL HISTORY:  Prostate cancer, staging;  Malignant neoplasm of prostate     Additional history: None provided.     TECHNIQUE:  Multiparametric MRI of the prostate/pelvis performed on a 3T scanner with phase pelvic coil. Multiplanar, multisequence images including high resolution, small field-of-view T2-WI; axial diffusion weighted images with multiple B-values and creation of ADC-maps; and dynamic contrast enhanced T1-weighted images through the prostate were obtained before, during, and after the administration of 10 cc intravenous gadolinium.     COMPARISON:  None.     FINDINGS:  Previous biopsy: Adenocarcinoma, Shanon 6, lateral left base and medial left base (05/20/2021)     PSA: 10.4 ng/mL (04/06/2023)     Prior therapy: None     Prostate: 4.6 x 3.3 x 3.2 cm corresponding to a computed volume of 24.2 cc.     Peripheral zone:     Lesion (JENNI) #P-1     Location: Side: Right; Region: mid; Zone: posterior peripheral zone     Greatest dimension: 0.9 cm     T2-WI: Linear or wedge-shaped hypointensity or diffuse mild hypointensity, score 2.     DWI/ADC: Focal markedly hypointense on ADC and markedly hyperintense on high b-value DWI; <1.5 cm in greatest  dimension, score 4.     DCE: Positive     Extraprostatic extension: Negative, noting mild capsular abutment     PI-RADS assessment category: 4     Transitional zone: Normal appearance, score 1.     Neurovascular bundle: Normal appearance.     Seminal vesicles: Normal appearance.     Adjacent Organ Involvement: No evidence for urinary bladder or rectal invasion.     Lymphadenopathy: None.     Other Findings: Bilateral fat-containing inguinal hernias.     Impression:     0.9 cm PI-RADS 4 lesion of the right mid posterior peripheral zone.     Overall Assessment: PI-RADS 4 - High (clinically significant cancer is likely to be present)     Number of targets created for potential MR/US fusion biopsy     Peripheral zone: 1     Transition zone: 0     Electronically signed by resident: Brendon Azar  Date:                                            05/01/2023  Time:                                           08:04     Electronically signed by: Marshall Samson MD  Date:                                            05/01/2023  Time:                                           20:52    Assessment:       1. Prostate cancer        Plan:         PIRADS 4 lesion on MRI  Recommend prostate biopsy, patient due for biopsy for active surveillance  Valium provided, not to take until instructed ( after signing consents)  Urine  Cipro/fleet rx provided

## 2023-06-08 ENCOUNTER — INFUSION (OUTPATIENT)
Dept: INFUSION THERAPY | Facility: HOSPITAL | Age: 78
End: 2023-06-08
Attending: INTERNAL MEDICINE
Payer: MEDICARE

## 2023-06-08 DIAGNOSIS — Z85.72 HISTORY OF NON-HODGKIN'S LYMPHOMA: Primary | ICD-10-CM

## 2023-06-08 PROCEDURE — 96523 IRRIG DRUG DELIVERY DEVICE: CPT

## 2023-06-08 PROCEDURE — 63600175 PHARM REV CODE 636 W HCPCS: Performed by: INTERNAL MEDICINE

## 2023-06-08 RX ORDER — HEPARIN 100 UNIT/ML
500 SYRINGE INTRAVENOUS
Status: DISCONTINUED | OUTPATIENT
Start: 2023-06-08 | End: 2023-06-08 | Stop reason: HOSPADM

## 2023-06-08 RX ORDER — SODIUM CHLORIDE 0.9 % (FLUSH) 0.9 %
10 SYRINGE (ML) INJECTION
Status: CANCELLED | OUTPATIENT
Start: 2023-06-08

## 2023-06-08 RX ORDER — SODIUM CHLORIDE 0.9 % (FLUSH) 0.9 %
10 SYRINGE (ML) INJECTION
Status: DISCONTINUED | OUTPATIENT
Start: 2023-06-08 | End: 2023-06-08 | Stop reason: HOSPADM

## 2023-06-08 RX ORDER — HEPARIN 100 UNIT/ML
500 SYRINGE INTRAVENOUS
Status: CANCELLED | OUTPATIENT
Start: 2023-06-08

## 2023-06-08 RX ADMIN — HEPARIN 500 UNITS: 100 SYRINGE at 08:06

## 2023-06-22 ENCOUNTER — TELEPHONE (OUTPATIENT)
Dept: UROLOGY | Facility: CLINIC | Age: 78
End: 2023-06-22
Payer: MEDICARE

## 2023-06-22 NOTE — TELEPHONE ENCOUNTER
Pt call was returned. Pt was advised to eat well balance breakfast prior to bx on Monday. Advised pt to eat so that way he doesn't get sick or weak. Pt verbalized understanding.

## 2023-06-22 NOTE — TELEPHONE ENCOUNTER
----- Message from Fela Niño sent at 6/22/2023 10:53 AM CDT -----  Regarding: patient call back  Type: Patient Call Back    Who called: Self     What is the request in detail: Asked for a call back about   Monday's procedure     Can the clinic reply by [facility]? No     Would the patient rather a call back or a response via [facility]? Call     Best call back number:  [phone]

## 2023-06-26 ENCOUNTER — TELEPHONE (OUTPATIENT)
Dept: UROLOGY | Facility: CLINIC | Age: 78
End: 2023-06-26

## 2023-06-26 ENCOUNTER — PROCEDURE VISIT (OUTPATIENT)
Dept: UROLOGY | Facility: CLINIC | Age: 78
End: 2023-06-26
Payer: MEDICARE

## 2023-06-26 VITALS — WEIGHT: 168.19 LBS | BODY MASS INDEX: 24.14 KG/M2

## 2023-06-26 DIAGNOSIS — C61 PROSTATE CANCER: Primary | ICD-10-CM

## 2023-06-26 PROCEDURE — 55700: CPT | Mod: S$GLB,,, | Performed by: STUDENT IN AN ORGANIZED HEALTH CARE EDUCATION/TRAINING PROGRAM

## 2023-06-26 PROCEDURE — 88341 IMHCHEM/IMCYTCHM EA ADD ANTB: CPT | Mod: 26,,, | Performed by: PATHOLOGY

## 2023-06-26 PROCEDURE — 88342 IMHCHEM/IMCYTCHM 1ST ANTB: CPT | Performed by: PATHOLOGY

## 2023-06-26 PROCEDURE — 88342 IMHCHEM/IMCYTCHM 1ST ANTB: CPT | Mod: 26,,, | Performed by: PATHOLOGY

## 2023-06-26 PROCEDURE — 55700: ICD-10-PCS | Mod: S$GLB,,, | Performed by: STUDENT IN AN ORGANIZED HEALTH CARE EDUCATION/TRAINING PROGRAM

## 2023-06-26 PROCEDURE — 76872: ICD-10-PCS | Mod: 26,S$GLB,, | Performed by: STUDENT IN AN ORGANIZED HEALTH CARE EDUCATION/TRAINING PROGRAM

## 2023-06-26 PROCEDURE — 88341 IMHCHEM/IMCYTCHM EA ADD ANTB: CPT | Performed by: PATHOLOGY

## 2023-06-26 PROCEDURE — 88341 PR IHC OR ICC EACH ADD'L SINGLE ANTIBODY  STAINPR: ICD-10-PCS | Mod: 26,,, | Performed by: PATHOLOGY

## 2023-06-26 PROCEDURE — 88305 TISSUE EXAM BY PATHOLOGIST: ICD-10-PCS | Mod: 26,,, | Performed by: PATHOLOGY

## 2023-06-26 PROCEDURE — 96372 PR INJECTION,THERAP/PROPH/DIAG2ST, IM OR SUBCUT: ICD-10-PCS | Mod: 59,S$GLB,, | Performed by: STUDENT IN AN ORGANIZED HEALTH CARE EDUCATION/TRAINING PROGRAM

## 2023-06-26 PROCEDURE — 88305 TISSUE EXAM BY PATHOLOGIST: CPT | Mod: 59 | Performed by: PATHOLOGY

## 2023-06-26 PROCEDURE — 88305 TISSUE EXAM BY PATHOLOGIST: CPT | Mod: 26,,, | Performed by: PATHOLOGY

## 2023-06-26 PROCEDURE — 88342 CHG IMMUNOCYTOCHEMISTRY: ICD-10-PCS | Mod: 26,,, | Performed by: PATHOLOGY

## 2023-06-26 PROCEDURE — 96372 THER/PROPH/DIAG INJ SC/IM: CPT | Mod: 59,S$GLB,, | Performed by: STUDENT IN AN ORGANIZED HEALTH CARE EDUCATION/TRAINING PROGRAM

## 2023-06-26 PROCEDURE — 76872 US TRANSRECTAL: CPT | Mod: 26,S$GLB,, | Performed by: STUDENT IN AN ORGANIZED HEALTH CARE EDUCATION/TRAINING PROGRAM

## 2023-06-26 RX ORDER — OXYCODONE HYDROCHLORIDE 5 MG/1
5 TABLET ORAL EVERY 8 HOURS PRN
Qty: 5 TABLET | Refills: 0 | Status: SHIPPED | OUTPATIENT
Start: 2023-06-26 | End: 2023-06-29

## 2023-06-26 RX ORDER — CIPROFLOXACIN 500 MG/1
500 TABLET ORAL EVERY 12 HOURS
Qty: 10 TABLET | Refills: 0 | Status: SHIPPED | OUTPATIENT
Start: 2023-06-26 | End: 2023-07-01

## 2023-06-26 RX ORDER — GENTAMICIN SULFATE 40 MG/ML
160 INJECTION, SOLUTION INTRAMUSCULAR; INTRAVENOUS
Status: COMPLETED | OUTPATIENT
Start: 2023-06-26 | End: 2023-06-26

## 2023-06-26 RX ADMIN — GENTAMICIN SULFATE 160 MG: 40 INJECTION, SOLUTION INTRAMUSCULAR; INTRAVENOUS at 01:06

## 2023-06-26 NOTE — PROCEDURES
"Transrectal Ultrasound w/ Biopsy- [facility]    Date/Time: 6/26/2023 11:00 AM  Performed by: David Simmons MD  Authorized by: David Simmons MD     Consent Done?:  Yes (Written)  Time out: Immediately prior to procedure a "time out" was called to verify the correct patient, procedure, equipment, support staff and site/side marked as required.    Preparation: Patient was prepped and draped in usual sterile fashion    Anesthesia:  10cc's 1% Lidocaine  Patient sedated: No    Prostate Size:  26 g  Lesions:: Yes         Type:  Mixed hypo- and hyperechoic  Left Base Biopsies: 2  Left Mid Biopsies: 2  Left [place] Biopsies: 2  Right Base Biopsies: 2  Total Biopsies:  8    Patient tolerance:  Patient tolerated the procedure well with no immediate complications     Target Left apex    "

## 2023-06-26 NOTE — TELEPHONE ENCOUNTER
Pt called to let us know he did not have any pain medication at the pharmacy.  I will message Dr. Simmons.      ----- Message from Nancy Jimenez sent at 6/26/2023  1:36 PM CDT -----  Regarding: self [phone]  Who called: self        What is the request in detail: pt was seen today by provider and pt wanted to know about the pain med that provider was calling in       Can the clinic reply by [facility]? No        Would the patient rather a call back or a response via [facility]? Call back       Best call back number:[phone]        Additional Information:   [address] [place] - [address] [facility]  [address] [facility]  [place] [address]  Phone: [phone] Fax: [phone]

## 2023-06-30 LAB
FINAL PATHOLOGIC DIAGNOSIS: NORMAL
GROSS: NORMAL
Lab: NORMAL

## 2023-07-06 ENCOUNTER — INFUSION (OUTPATIENT)
Dept: INFUSION THERAPY | Facility: HOSPITAL | Age: 78
End: 2023-07-06
Attending: INTERNAL MEDICINE
Payer: MEDICARE

## 2023-07-06 VITALS
DIASTOLIC BLOOD PRESSURE: 70 MMHG | RESPIRATION RATE: 18 BRPM | TEMPERATURE: 98 F | SYSTOLIC BLOOD PRESSURE: 126 MMHG | HEART RATE: 99 BPM | OXYGEN SATURATION: 97 %

## 2023-07-06 DIAGNOSIS — Z85.72 HISTORY OF NON-HODGKIN'S LYMPHOMA: Primary | ICD-10-CM

## 2023-07-06 PROCEDURE — 96523 IRRIG DRUG DELIVERY DEVICE: CPT

## 2023-07-06 PROCEDURE — 63600175 PHARM REV CODE 636 W HCPCS: Performed by: INTERNAL MEDICINE

## 2023-07-06 RX ORDER — SODIUM CHLORIDE 0.9 % (FLUSH) 0.9 %
10 SYRINGE (ML) INJECTION
Status: DISCONTINUED | OUTPATIENT
Start: 2023-07-06 | End: 2023-07-06 | Stop reason: HOSPADM

## 2023-07-06 RX ORDER — HEPARIN 100 UNIT/ML
500 SYRINGE INTRAVENOUS
Status: CANCELLED | OUTPATIENT
Start: 2023-07-06

## 2023-07-06 RX ORDER — HEPARIN 100 UNIT/ML
500 SYRINGE INTRAVENOUS
Status: DISCONTINUED | OUTPATIENT
Start: 2023-07-06 | End: 2023-07-06 | Stop reason: HOSPADM

## 2023-07-06 RX ORDER — SODIUM CHLORIDE 0.9 % (FLUSH) 0.9 %
10 SYRINGE (ML) INJECTION
Status: CANCELLED | OUTPATIENT
Start: 2023-07-06

## 2023-07-06 RX ADMIN — HEPARIN 500 UNITS: 100 SYRINGE at 08:07

## 2023-07-17 ENCOUNTER — OFFICE VISIT (OUTPATIENT)
Dept: UROLOGY | Facility: CLINIC | Age: 78
End: 2023-07-17
Payer: MEDICARE

## 2023-07-17 VITALS — WEIGHT: 168.13 LBS | BODY MASS INDEX: 24.12 KG/M2

## 2023-07-17 DIAGNOSIS — C61 PROSTATE CANCER: Primary | ICD-10-CM

## 2023-07-17 PROCEDURE — 1101F PT FALLS ASSESS-DOCD LE1/YR: CPT | Mod: CPTII,S$GLB,, | Performed by: STUDENT IN AN ORGANIZED HEALTH CARE EDUCATION/TRAINING PROGRAM

## 2023-07-17 PROCEDURE — 1125F AMNT PAIN NOTED PAIN PRSNT: CPT | Mod: CPTII,S$GLB,, | Performed by: STUDENT IN AN ORGANIZED HEALTH CARE EDUCATION/TRAINING PROGRAM

## 2023-07-17 PROCEDURE — 1160F RVW MEDS BY RX/DR IN RCRD: CPT | Mod: CPTII,S$GLB,, | Performed by: STUDENT IN AN ORGANIZED HEALTH CARE EDUCATION/TRAINING PROGRAM

## 2023-07-17 PROCEDURE — 1101F PR PT FALLS ASSESS DOC 0-1 FALLS W/OUT INJ PAST YR: ICD-10-PCS | Mod: CPTII,S$GLB,, | Performed by: STUDENT IN AN ORGANIZED HEALTH CARE EDUCATION/TRAINING PROGRAM

## 2023-07-17 PROCEDURE — 1157F PR ADVANCE CARE PLAN OR EQUIV PRESENT IN MEDICAL RECORD: ICD-10-PCS | Mod: CPTII,S$GLB,, | Performed by: STUDENT IN AN ORGANIZED HEALTH CARE EDUCATION/TRAINING PROGRAM

## 2023-07-17 PROCEDURE — 1159F MED LIST DOCD IN RCRD: CPT | Mod: CPTII,S$GLB,, | Performed by: STUDENT IN AN ORGANIZED HEALTH CARE EDUCATION/TRAINING PROGRAM

## 2023-07-17 PROCEDURE — 1125F PR PAIN SEVERITY QUANTIFIED, PAIN PRESENT: ICD-10-PCS | Mod: CPTII,S$GLB,, | Performed by: STUDENT IN AN ORGANIZED HEALTH CARE EDUCATION/TRAINING PROGRAM

## 2023-07-17 PROCEDURE — 3288F FALL RISK ASSESSMENT DOCD: CPT | Mod: CPTII,S$GLB,, | Performed by: STUDENT IN AN ORGANIZED HEALTH CARE EDUCATION/TRAINING PROGRAM

## 2023-07-17 PROCEDURE — 99999 PR PBB SHADOW E&M-EST. PATIENT-LVL III: CPT | Mod: PBBFAC,,, | Performed by: STUDENT IN AN ORGANIZED HEALTH CARE EDUCATION/TRAINING PROGRAM

## 2023-07-17 PROCEDURE — 1159F PR MEDICATION LIST DOCUMENTED IN MEDICAL RECORD: ICD-10-PCS | Mod: CPTII,S$GLB,, | Performed by: STUDENT IN AN ORGANIZED HEALTH CARE EDUCATION/TRAINING PROGRAM

## 2023-07-17 PROCEDURE — 99214 PR OFFICE/OUTPT VISIT, EST, LEVL IV, 30-39 MIN: ICD-10-PCS | Mod: S$GLB,,, | Performed by: STUDENT IN AN ORGANIZED HEALTH CARE EDUCATION/TRAINING PROGRAM

## 2023-07-17 PROCEDURE — 99999 PR PBB SHADOW E&M-EST. PATIENT-LVL III: ICD-10-PCS | Mod: PBBFAC,,, | Performed by: STUDENT IN AN ORGANIZED HEALTH CARE EDUCATION/TRAINING PROGRAM

## 2023-07-17 PROCEDURE — 99214 OFFICE O/P EST MOD 30 MIN: CPT | Mod: S$GLB,,, | Performed by: STUDENT IN AN ORGANIZED HEALTH CARE EDUCATION/TRAINING PROGRAM

## 2023-07-17 PROCEDURE — 1160F PR REVIEW ALL MEDS BY PRESCRIBER/CLIN PHARMACIST DOCUMENTED: ICD-10-PCS | Mod: CPTII,S$GLB,, | Performed by: STUDENT IN AN ORGANIZED HEALTH CARE EDUCATION/TRAINING PROGRAM

## 2023-07-17 PROCEDURE — 1157F ADVNC CARE PLAN IN RCRD: CPT | Mod: CPTII,S$GLB,, | Performed by: STUDENT IN AN ORGANIZED HEALTH CARE EDUCATION/TRAINING PROGRAM

## 2023-07-17 PROCEDURE — 3288F PR FALLS RISK ASSESSMENT DOCUMENTED: ICD-10-PCS | Mod: CPTII,S$GLB,, | Performed by: STUDENT IN AN ORGANIZED HEALTH CARE EDUCATION/TRAINING PROGRAM

## 2023-07-17 NOTE — PROGRESS NOTES
Patient ID: Long Najera is a 77 y.o. male.    Chief Complaint: Results (Pt wants to know what's the next step after the procedure )    Referral   HPI  Hx of elevated PSA, found to have low risk prostate cancer in 2021, has been on active surveillance. Recently underwent MRI of prostate in anticipation for confirmatory biopsy, MRI fusion biopsy revealed grade group 2 disease.   Patient denies complications from biopsy   Here today with significant other  Hx of Lymphoma treated with radiation therapy remotely      ROS  Review of Systems   Constitutional:  Negative for activity change, appetite change, chills, diaphoresis, fatigue and fever.   HENT:  Negative for congestion, rhinorrhea and sore throat.    Eyes:  Negative for discharge and visual disturbance.   Respiratory:  Negative for cough, chest tightness, shortness of breath and wheezing.    Cardiovascular:  Negative for chest pain and leg swelling.   Gastrointestinal:  Negative for abdominal distention, abdominal pain, blood in stool, constipation, diarrhea, nausea and vomiting.   Genitourinary:  Negative for dysuria.   Musculoskeletal:  Negative for back pain and gait problem.   Skin:  Negative for color change, rash and wound.   Allergic/Immunologic: Negative for immunocompromised state.   Neurological:  Negative for light-headedness and headaches.   Psychiatric/Behavioral:  Negative for confusion. The patient is not nervous/anxious.        Past Medical History  Active Ambulatory Problems     Diagnosis Date Noted    ED (erectile dysfunction) 11/27/2012    History of non-Hodgkin's lymphoma 12/18/2012    Gynecomastia, male 02/17/2014    Essential hypertension, benign 05/27/2014    NHL (non-Hodgkin's lymphoma) 06/16/2014    Hypercholesterolemia 11/25/2015    Body mass index (BMI) of 24.0-24.9 in adult 11/25/2015    BMI 25.0-25.9,adult 02/03/2017    Elevated PSA 02/03/2017    Benign non-nodular prostatic hyperplasia with lower urinary tract symptoms  05/09/2017    Hyperlipidemia LDL goal <130 08/28/2017    HTN, goal below 140/90 08/28/2017    Lymphoma in remission 02/28/2018    Skin lesions 02/28/2018    Flu vaccine need 02/28/2018    Iron deficiency anemia 04/26/2021    Prostate cancer 06/07/2021     Resolved Ambulatory Problems     Diagnosis Date Noted    Leukopenia 12/18/2012    Contact dermatitis 02/17/2014     Past Medical History:   Diagnosis Date    Asthma 30+ years ago    Cancer 1992    History of colon polyps     Hypercholesteremia     Hypertension     Lymphoma          Past Surgical History  Past Surgical History:   Procedure Laterality Date    axilla biopsy  4/15/2013    axillary lymph node removal  10/11/3013    COLONOSCOPY      COLONOSCOPY N/A 4/26/2021    Procedure: COLONOSCOPY;  Surgeon: Joao Patel MD;  Location: [facility] ENDO (4TH [facility]);  Service: Endoscopy;  Laterality: N/A;  COVID screening 4/23 [facility]  EGD with  at 7:00am    ESOPHAGOGASTRODUODENOSCOPY N/A 4/26/2021    Procedure: EGD (ESOPHAGOGASTRODUODENOSCOPY) with gi;  Surgeon: Duane Garza MD;  [facility]  Service: Endoscopy;  Laterality: N/A;  colonoscopy to follow with     PORTACAT PLACEMENT  05/08/2013    TONSILLECTOMY, ADENOIDECTOMY         Social History  Social Connections: Not on file       Medications    Current Outpatient Medications:     acetaminophen (TYLENOL) 650 MG TbSR, Take 1 tablet (650 mg total) by mouth every 8 (eight) hours as needed., Disp: 30 tablet, Rfl: 0    lisinopril-hydrochlorothiazide (PRINZIDE,ZESTORETIC) 10-12.5 mg per tablet, Take 1 tablet by mouth once daily., Disp: 90 tablet, Rfl: 4    mupirocin (BACTROBAN) 2 % ointment, Apply to affected area 3 times daily, Disp: 22 g, Rfl: 1    sildenafil (VIAGRA) 100 MG tablet, Take 0.5 tablets (50 mg total) by mouth daily as needed., Disp: 10 tablet, Rfl: 11    sodium,potassium,mag sulfates (SUPREP BOWEL PREP KIT) 17.5-3.13-1.6 gram SolR, As Directed, Disp: 1 Bottle,  Rfl: 0    aspirin (ECOTRIN) 81 MG EC tablet, Take 1 tablet (81 mg total) by mouth once daily., Disp: 150 tablet, Rfl: 2    atorvastatin (LIPITOR) 40 MG tablet, Take 1 tablet (40 mg total) by mouth once daily., Disp: 90 tablet, Rfl: 3    diazePAM (VALIUM) 10 MG Tab, Take 1 tablet (10 mg total) by mouth On call Procedure (take when instructed ( not before) for urology procedure)., Disp: 1 tablet, Rfl: 0    diclofenac sodium (VOLTAREN) 1 % Gel, Apply 2 g topically 4 (four) times daily as needed., Disp: 100 g, Rfl: 0    fluocinonide 0.05% (LIDEX) 0.05 % cream, Apply topically 2 (two) times daily. for 7 days, Disp: 120 g, Rfl: 0    Allergies  Review of patient's allergies indicates:   Allergen Reactions    Lactose Other (See Comments)     Pain in stomach if drink milk    Tylenol-codeine [acetaminophen-codeine] Nausea Only       Patient's PMH, FH, Social hx, Medications, allergies reviewed and updated as pertinent to today's visit    Objective:      Physical Exam  Constitutional:       General: He is not in acute distress.     Appearance: He is well-developed. He is not ill-appearing, toxic-appearing or diaphoretic.   HENT:      Head: Normocephalic and atraumatic.      Mouth/Throat:      Mouth: Mucous membranes are moist.   Eyes:      Conjunctiva/sclera: Conjunctivae normal.   Pulmonary:      Effort: Pulmonary effort is normal. No respiratory distress.   Abdominal:      General: Abdomen is flat. There is no distension.      Palpations: Abdomen is soft.   Musculoskeletal:         General: No swelling or deformity.      Cervical back: Neck supple.   Skin:     Capillary Refill: Capillary refill takes less than 2 seconds.      Findings: No rash.   Neurological:      Mental Status: He is alert and oriented to person, place, and time.      Gait: Gait normal.   Psychiatric:         Mood and Affect: Mood normal.         Thought Content: Thought content normal.         Judgment: Judgment normal.         Imaging results:    EXAMINATION:  MRI PROSTATE W W/O CONTRAST     CLINICAL HISTORY:  Prostate cancer, staging;  Malignant neoplasm of prostate     Additional history: None provided.     TECHNIQUE:  Multiparametric MRI of the prostate/pelvis performed on a 3T scanner with phase pelvic coil. Multiplanar, multisequence images including high resolution, small field-of-view T2-WI; axial diffusion weighted images with multiple B-values and creation of ADC-maps; and dynamic contrast enhanced T1-weighted images through the prostate were obtained before, during, and after the administration of 10 cc intravenous gadolinium.     COMPARISON:  None.     FINDINGS:  Previous biopsy: Adenocarcinoma, Shanon 6, lateral left base and medial left base (05/20/2021)     PSA: 10.4 ng/mL (04/06/2023)     Prior therapy: None     Prostate: 4.6 x 3.3 x 3.2 cm corresponding to a computed volume of 24.2 cc.     Peripheral zone:     Lesion (JENNI) #P-1     Location: Side: Right; Region: mid; Zone: posterior peripheral zone     Greatest dimension: 0.9 cm     T2-WI: Linear or wedge-shaped hypointensity or diffuse mild hypointensity, score 2.     DWI/ADC: Focal markedly hypointense on ADC and markedly hyperintense on high b-value DWI; <1.5 cm in greatest dimension, score 4.     DCE: Positive     Extraprostatic extension: Negative, noting mild capsular abutment     PI-RADS assessment category: 4     Transitional zone: Normal appearance, score 1.     Neurovascular bundle: Normal appearance.     Seminal vesicles: Normal appearance.     Adjacent Organ Involvement: No evidence for urinary bladder or rectal invasion.     Lymphadenopathy: None.     Other Findings: Bilateral fat-containing inguinal hernias.     Impression:     0.9 cm PI-RADS 4 lesion of the right mid posterior peripheral zone.     Overall Assessment: PI-RADS 4 - High (clinically significant cancer is likely to be present)     Number of targets created for potential MR/US fusion biopsy     Peripheral zone:  1     Transition zone: 0     Electronically signed by resident: Brendon Azar  Date:                                            05/01/2023  Time:                                           08:04     Electronically signed by: Marshall Samson MD  Date:                                            05/01/2023  Time:                                           20:52    Component 3 wk ago   Final Pathologic Diagnosis 1. Prostate, left base lateral, biopsy:   Benign prostatic tissue     2. Prostate, left base medial, biopsy:   Benign prostatic tissue     3.  Prostate, left mid lateral, biopsy:   Benign prostatic tissue     4. Prostate, left mid medial, biopsy:   Benign prostatic tissue     5. Prostate, left apex lateral, biopsy:   Benign prostatic tissue     6. Prostate, left apex medial, biopsy:   Benign prostatic tissue     7. Prostate, right apex lateral, biopsy:   Benign prostatic tissue     8. Prostate, target, biopsy:   Prostatic adenocarcinoma, Grade Group 2, Crete score (3 (70% ) +4 (30%))=7, supported by immunohistochemical stains for AMACR, p63, and HMWK with adequate controls   The tumor is present in 1/1 cores   The tumor measures 8 mm in length, approximately 80% of total prostatic tissue   No perineural invasion identified    Comment: Interp By Ludivina Campos D.O., Signed on 06/30/2023 at 14:23       Lab Results   Component Value Date    PSADIAG 10.4 (H) 04/06/2023    PSADIAG 4.6 (H) 05/04/2017    PSADIAG 4.7 (H) 01/27/2017        Assessment:       1. Prostate cancer        Plan:       IM risk prostate cancer, to be evaluated by radiation oncology to see if patient a candidate for radiation given his hx of radiation for lymphoma  Patient prefers radiation instead of  surgery  Patient declined genetic testing     Discussed risks, benefits, evidence based treatment options for prostate cancer involving: Active surveillance ( with confirmatory biopsy) Surgery vs Radiation therapy. Radiation and surgery will  have some degree of urinary and erectile symptoms which in most cases resolve overtime.  - Surgery is typically performed robotically (minimally invasive approach), through small incisions on the abdomen to reach the prostate to remove it along with seminal vesicles and vas deferens, that produce the majority of the semen, and hence, no ejaculatory fluid or fertility will be maintained post surgery.  The surgical procedure involves overnight stay at the hospital, some need more time for recovery which is re-evaluated daily. Patient will need still catheter post surgery due to reconstruction of the urinary channel, the catheter will remain in place for about 7-10 days. At time of still catheter removal a discussion of the pathology reports will be shared and discussed with patient.   - Urinary leakage will be present after the catheter is removed, however some kind of disability will be persistent for 4-6 weeks post surgery. I encourage Kegel exercise to strengthen the endurance of the pelvic floor. There is a small risk  (5-10%) of persistent sever urinary incontinence post surgery, after conservative management, prostate cancer therapy is completed, patient maybe considered for management with artificial urinary sphincter or urinary sling depending on patient's degree of symptoms.   -Erectile dysfunction post surgery is largely dependent on the current status of erectile function; however, it can be augmented with oral medications like Viagra, Cialis, Levitra, and Stendra, intracavernosal injections, intraurethral suppositories, vacuum constriction pump device, if patient fails to achieve sufficient erections to patient's satisfactory after conservative measures patient may be considered a candidate for an implanted penile prosthesis. Discussed there are approaches, nerve sparing, which can be performed as long as it does not risk oncologic cure.  - Other general complications such as MI, DVT, PE, lymphocele, ileus  reviewed  -Discussed w/ patient that depending on pathology report and post op PSA kinetics adjuvant/salvage XRT may be required.       - Radiation therapy. Depending on patient's GG/GS/Stage patient may need to undergo hormonal therapy combined with radiation.   Full risks and benefits of XRT will be shared with patient per radiation oncologist should patient elect that treatment pathway. Discussed surgery after patient has received radiation is extremely difficult and puts patient at increased risk on complications due to inelastic tissue, complications including bleeding, rectal injury, rectourethral fistula.   Radiation carries the risks of short-term difficulty with urination (frequency, urgency, nocturia, dysuria), changes in bowel movement habits and longer-term risks of erectile dysfunction, urethral stricture, rectal bleeding, weakening of hip bones, and secondary malignancy ( ie bladder cancer).     - Shared with patient the outcomes of radiation and surgery are similar and the decision to proceed with either treatment modality is based on his desires and acceptance of risk post operatively.    - Discussed the importance of surveillance of PSA post intervention every 3 months for a year, if undetectable will follow with PSA 6 months every year and if undetectable annually.  Provided [facility] pamphlet re: Prostate cancer  Provided pathology results

## 2023-08-03 ENCOUNTER — INFUSION (OUTPATIENT)
Dept: INFUSION THERAPY | Facility: HOSPITAL | Age: 78
End: 2023-08-03
Attending: INTERNAL MEDICINE
Payer: MEDICARE

## 2023-08-03 VITALS
HEART RATE: 88 BPM | RESPIRATION RATE: 18 BRPM | DIASTOLIC BLOOD PRESSURE: 81 MMHG | SYSTOLIC BLOOD PRESSURE: 153 MMHG | OXYGEN SATURATION: 98 % | TEMPERATURE: 98 F

## 2023-08-03 DIAGNOSIS — Z85.72 HISTORY OF NON-HODGKIN'S LYMPHOMA: Primary | ICD-10-CM

## 2023-08-03 PROCEDURE — 96523 IRRIG DRUG DELIVERY DEVICE: CPT

## 2023-08-03 PROCEDURE — A4216 STERILE WATER/SALINE, 10 ML: HCPCS | Performed by: INTERNAL MEDICINE

## 2023-08-03 PROCEDURE — 25000003 PHARM REV CODE 250: Performed by: INTERNAL MEDICINE

## 2023-08-03 PROCEDURE — 63600175 PHARM REV CODE 636 W HCPCS: Performed by: INTERNAL MEDICINE

## 2023-08-03 RX ORDER — SODIUM CHLORIDE 0.9 % (FLUSH) 0.9 %
10 SYRINGE (ML) INJECTION
Status: DISCONTINUED | OUTPATIENT
Start: 2023-08-03 | End: 2023-08-03 | Stop reason: HOSPADM

## 2023-08-03 RX ORDER — HEPARIN 100 UNIT/ML
500 SYRINGE INTRAVENOUS
Status: CANCELLED | OUTPATIENT
Start: 2023-08-03

## 2023-08-03 RX ORDER — HEPARIN 100 UNIT/ML
500 SYRINGE INTRAVENOUS
Status: DISCONTINUED | OUTPATIENT
Start: 2023-08-03 | End: 2023-08-03 | Stop reason: HOSPADM

## 2023-08-03 RX ORDER — SODIUM CHLORIDE 0.9 % (FLUSH) 0.9 %
10 SYRINGE (ML) INJECTION
Status: CANCELLED | OUTPATIENT
Start: 2023-08-03

## 2023-08-03 RX ADMIN — HEPARIN 500 UNITS: 100 SYRINGE at 08:08

## 2023-08-03 RX ADMIN — Medication 10 ML: at 08:08

## 2023-08-04 ENCOUNTER — OFFICE VISIT (OUTPATIENT)
Dept: RADIATION ONCOLOGY | Facility: CLINIC | Age: 78
End: 2023-08-04
Payer: MEDICARE

## 2023-08-04 VITALS
RESPIRATION RATE: 16 BRPM | SYSTOLIC BLOOD PRESSURE: 157 MMHG | WEIGHT: 167.63 LBS | BODY MASS INDEX: 24.05 KG/M2 | DIASTOLIC BLOOD PRESSURE: 73 MMHG | HEART RATE: 96 BPM

## 2023-08-04 DIAGNOSIS — C61 PROSTATE CANCER: Primary | ICD-10-CM

## 2023-08-04 PROCEDURE — 1159F MED LIST DOCD IN RCRD: CPT | Mod: CPTII,S$GLB,, | Performed by: RADIOLOGY

## 2023-08-04 PROCEDURE — 1126F PR PAIN SEVERITY QUANTIFIED, NO PAIN PRESENT: ICD-10-PCS | Mod: CPTII,S$GLB,, | Performed by: RADIOLOGY

## 2023-08-04 PROCEDURE — 3077F PR MOST RECENT SYSTOLIC BLOOD PRESSURE >= 140 MM HG: ICD-10-PCS | Mod: CPTII,S$GLB,, | Performed by: RADIOLOGY

## 2023-08-04 PROCEDURE — 3288F FALL RISK ASSESSMENT DOCD: CPT | Mod: CPTII,S$GLB,, | Performed by: RADIOLOGY

## 2023-08-04 PROCEDURE — 99215 PR OFFICE/OUTPT VISIT, EST, LEVL V, 40-54 MIN: ICD-10-PCS | Mod: S$GLB,,, | Performed by: RADIOLOGY

## 2023-08-04 PROCEDURE — 99215 OFFICE O/P EST HI 40 MIN: CPT | Mod: S$GLB,,, | Performed by: RADIOLOGY

## 2023-08-04 PROCEDURE — 1101F PR PT FALLS ASSESS DOC 0-1 FALLS W/OUT INJ PAST YR: ICD-10-PCS | Mod: CPTII,S$GLB,, | Performed by: RADIOLOGY

## 2023-08-04 PROCEDURE — 1157F PR ADVANCE CARE PLAN OR EQUIV PRESENT IN MEDICAL RECORD: ICD-10-PCS | Mod: CPTII,S$GLB,, | Performed by: RADIOLOGY

## 2023-08-04 PROCEDURE — 3077F SYST BP >= 140 MM HG: CPT | Mod: CPTII,S$GLB,, | Performed by: RADIOLOGY

## 2023-08-04 PROCEDURE — 1101F PT FALLS ASSESS-DOCD LE1/YR: CPT | Mod: CPTII,S$GLB,, | Performed by: RADIOLOGY

## 2023-08-04 PROCEDURE — 99999 PR PBB SHADOW E&M-EST. PATIENT-LVL III: CPT | Mod: PBBFAC,,, | Performed by: RADIOLOGY

## 2023-08-04 PROCEDURE — 1157F ADVNC CARE PLAN IN RCRD: CPT | Mod: CPTII,S$GLB,, | Performed by: RADIOLOGY

## 2023-08-04 PROCEDURE — 1126F AMNT PAIN NOTED NONE PRSNT: CPT | Mod: CPTII,S$GLB,, | Performed by: RADIOLOGY

## 2023-08-04 PROCEDURE — 3078F PR MOST RECENT DIASTOLIC BLOOD PRESSURE < 80 MM HG: ICD-10-PCS | Mod: CPTII,S$GLB,, | Performed by: RADIOLOGY

## 2023-08-04 PROCEDURE — 99999 PR PBB SHADOW E&M-EST. PATIENT-LVL III: ICD-10-PCS | Mod: PBBFAC,,, | Performed by: RADIOLOGY

## 2023-08-04 PROCEDURE — 1159F PR MEDICATION LIST DOCUMENTED IN MEDICAL RECORD: ICD-10-PCS | Mod: CPTII,S$GLB,, | Performed by: RADIOLOGY

## 2023-08-04 PROCEDURE — 3078F DIAST BP <80 MM HG: CPT | Mod: CPTII,S$GLB,, | Performed by: RADIOLOGY

## 2023-08-04 PROCEDURE — 1160F PR REVIEW ALL MEDS BY PRESCRIBER/CLIN PHARMACIST DOCUMENTED: ICD-10-PCS | Mod: CPTII,S$GLB,, | Performed by: RADIOLOGY

## 2023-08-04 PROCEDURE — 1160F RVW MEDS BY RX/DR IN RCRD: CPT | Mod: CPTII,S$GLB,, | Performed by: RADIOLOGY

## 2023-08-04 PROCEDURE — 3288F PR FALLS RISK ASSESSMENT DOCUMENTED: ICD-10-PCS | Mod: CPTII,S$GLB,, | Performed by: RADIOLOGY

## 2023-08-04 RX ORDER — TAMSULOSIN HYDROCHLORIDE 0.4 MG/1
1 CAPSULE ORAL NIGHTLY
COMMUNITY
Start: 2023-06-19

## 2023-08-04 RX ORDER — TRIAMCINOLONE ACETONIDE 1 MG/G
PASTE DENTAL
COMMUNITY
Start: 2023-08-02

## 2023-08-04 RX ORDER — IBUPROFEN 800 MG/1
800 TABLET ORAL 3 TIMES DAILY
COMMUNITY
Start: 2023-06-12 | End: 2023-11-14

## 2023-08-04 RX ORDER — MOMETASONE FUROATE 1 MG/G
CREAM TOPICAL
COMMUNITY
Start: 2023-08-02

## 2023-08-05 NOTE — PROGRESS NOTES
[facility] - Radiation Oncology    HISTORY OF PRESENT ILLNESS:   This patient presents for discussion of treatment options for his prostate cancer.     Mr. Najera has a history of stage I prostate cancer dating back to 2021 when he presented with an elevated PSA of 7.5 ng/ml.  [facility] revealed [place] 6 (3+3) adenocarcinoma involving 2/2 cores.  The remaining 10 cores were negative.  He elected for active surveillance.  His most recent PSA on 4/5/23 returned at 10.4 ng/ml.  MRI revealed a 24.2 cc prostate with a 9 mm T2 hypointense lesion in the Rt. mid gland, PI-RADS 4 without extracapsular extension.  The seminal vesicles and neurovascular bundles were unremarkable.  There was no adenopathy.  Repeat biopsies on 6/26/23 revealed [place] 7 (3+4) adenocarcinoma involving 80% of 1/1 core from the target lesion.  The [place]on pattern 4 accounted for 30% of the tumor.  The remaining cores were negative.  The patient presents for discussion of treatment options.        REVIEW OF SYSTEMS:   Review of Systems   Constitutional:  Negative for chills, fever, malaise/fatigue and weight loss.   Respiratory:  Negative for cough and shortness of breath.    Cardiovascular:  Negative for chest pain and palpitations.   Gastrointestinal:  Negative for abdominal pain, constipation and diarrhea.   Genitourinary:  Positive for frequency and urgency. Negative for dysuria and hematuria.        AUA score 2,, 1, 3, 1, 0, 2, 2  pleased.  Currently on tamsulosin  ED corrected with Viagra.    Musculoskeletal:  Negative for back pain and neck pain.         PAST MEDICAL HISTORY:  Past Medical History:   Diagnosis Date    Asthma 30+ years ago    Cancer 1992    non hodgkins lymphoma.  5/7/2013  Currently w/ dx of cancer to lymph nodes and metastasis    History of colon polyps     Hypercholesteremia     Hypertension     Lymphoma        PAST SURGICAL HISTORY:  Past Surgical History:   Procedure  Laterality Date    axilla biopsy  4/15/2013    axillary lymph node removal  10/11/3013    COLONOSCOPY      COLONOSCOPY N/A 4/26/2021    Procedure: COLONOSCOPY;  Surgeon: Joao Patel MD;  Location: [facility] ENDO (4TH FLR);  Service: Endoscopy;  Laterality: N/A;  COVID screening 4/23 LAPC-rb  EGD with  at 7:00am    ESOPHAGOGASTRODUODENOSCOPY N/A 4/26/2021    Procedure: EGD (ESOPHAGOGASTRODUODENOSCOPY) with gi;  Surgeon: Duane Garza MD;  Location: [facility] ENDO (4TH FLR);  Service: Endoscopy;  Laterality: N/A;  [facility]    TONSILLECTOMY, ADENOIDECTOMY         ALLERGIES:   Review of patient's allergies indicates:   Allergen Reactions    Lactose Other (See Comments)     Pain in stomach if drink milk    Tylenol-codeine [acetaminophen-codeine] Nausea Only       MEDICATIONS:  Current Outpatient Medications   Medication Sig    acetaminophen (TYLENOL) 650 MG TbSR Take 1 tablet (650 mg total) by mouth every 8 (eight) hours as needed.    ibuprofen (ADVIL,MOTRIN) 800 MG tablet Take 800 mg by mouth 3 (three) times daily.    lisinopril-hydrochlorothiazide (PRINZIDE,ZESTORETIC) 10-12.5 mg per tablet Take 1 tablet by mouth once daily.    mometasone 0.1% (ELOCON) 0.1 % cream Apply topically.    mupirocin (BACTROBAN) 2 % ointment Apply to affected area 3 times daily    sildenafil (VIAGRA) 100 MG tablet Take 0.5 tablets (50 mg total) by mouth daily as needed.    sodium,potassium,mag sulfates (SUPREP BOWEL PREP KIT) 17.5-3.13-1.6 gram SolR As Directed    tamsulosin (FLOMAX) 0.4 mg Cap Take 1 capsule by mouth every evening.    triamcinolone acetonide 0.1% (KENALOG) 0.1 % paste SMARTSIG:sparingly By Mouth Twice Daily    atorvastatin (LIPITOR) 40 MG tablet Take 1 tablet (40 mg total) by mouth once daily.    diclofenac sodium (VOLTAREN) 1 % Gel Apply 2 g topically 4 (four) times daily as needed.    fluocinonide 0.05% (LIDEX) 0.05 % cream Apply topically 2  (two) times daily. for 7 days     No current facility-administered medications for this visit.       SOCIAL HISTORY:  Social History     Socioeconomic History    Marital status:    Tobacco Use    Smoking status: Never    Smokeless tobacco: Never   Substance and Sexual Activity    Alcohol use: No    Drug use: No    Sexual activity: Yes     Partners: Female       FAMILY HISTORY:  Family History   Problem Relation Age of Onset    Hypertension Mother     Seizures Sister          PHYSICAL EXAMINATION:  Vitals:    23 1539   BP: (!) 157/73   Pulse: 96   Resp: 16     Physical Exam  Constitutional:       General: He is not in acute distress.     Appearance: Normal appearance.   Pulmonary:      Effort: Pulmonary effort is normal. No respiratory distress.   Abdominal:      General: Abdomen is flat. There is no distension.   Neurological:      Mental Status: He is alert and oriented to person, place, and time.   Psychiatric:         Mood and Affect: Mood normal.         Judgment: Judgment normal.         ASSESSMENT/PLAN:  History of Stage I prostate cancer with progression to Stage IIB disease.     ECO    I had a long discussion with the patient and his wife.  Explained he is now considered to have favorable intermediate risk prostate cancer.  Discussed the implications of this classification.  Discussed the options of continued active surveillance vs definitive radiotherapy either with external beam therapy delivering 70 Gy in 28 fractions to the prostate gland or prostate brachytherapy vis Iodine 125 seeds.  Discussed the procedures, risk and benefits of each form of radiotherapy.  Discussed the pros and cons of treatment vs active surveillance.  The patient is leaning towards prostate brachytherapy but would like to continue active surveillance for a few more months.  Will plan follow up with us in 2 - 3 months with PSA.  Thank you for allowing us to participate in the care of this patient.       Psychosocial Distress screening score of Distress Score: 1 noted and reviewed. No intervention indicated.     I spent approximately 50 minutes reviewing the available records and evaluating the patient, out of which over 50% of the time was spent face to face with the patient in counseling and coordinating this patient's care.

## 2023-08-31 ENCOUNTER — INFUSION (OUTPATIENT)
Dept: INFUSION THERAPY | Facility: HOSPITAL | Age: 78
End: 2023-08-31
Attending: INTERNAL MEDICINE
Payer: MEDICARE

## 2023-08-31 VITALS
TEMPERATURE: 98 F | OXYGEN SATURATION: 97 % | DIASTOLIC BLOOD PRESSURE: 66 MMHG | SYSTOLIC BLOOD PRESSURE: 139 MMHG | HEART RATE: 96 BPM | RESPIRATION RATE: 18 BRPM

## 2023-08-31 DIAGNOSIS — Z85.72 HISTORY OF NON-HODGKIN'S LYMPHOMA: Primary | ICD-10-CM

## 2023-08-31 PROCEDURE — 63600175 PHARM REV CODE 636 W HCPCS: Performed by: INTERNAL MEDICINE

## 2023-08-31 PROCEDURE — 96523 IRRIG DRUG DELIVERY DEVICE: CPT

## 2023-08-31 PROCEDURE — 25000003 PHARM REV CODE 250: Performed by: INTERNAL MEDICINE

## 2023-08-31 PROCEDURE — A4216 STERILE WATER/SALINE, 10 ML: HCPCS | Performed by: INTERNAL MEDICINE

## 2023-08-31 RX ORDER — HEPARIN 100 UNIT/ML
500 SYRINGE INTRAVENOUS
Status: DISCONTINUED | OUTPATIENT
Start: 2023-08-31 | End: 2023-08-31 | Stop reason: HOSPADM

## 2023-08-31 RX ORDER — HEPARIN 100 UNIT/ML
500 SYRINGE INTRAVENOUS
Status: CANCELLED | OUTPATIENT
Start: 2023-08-31

## 2023-08-31 RX ORDER — SODIUM CHLORIDE 0.9 % (FLUSH) 0.9 %
10 SYRINGE (ML) INJECTION
Status: CANCELLED | OUTPATIENT
Start: 2023-08-31

## 2023-08-31 RX ORDER — SODIUM CHLORIDE 0.9 % (FLUSH) 0.9 %
10 SYRINGE (ML) INJECTION
Status: DISCONTINUED | OUTPATIENT
Start: 2023-08-31 | End: 2023-08-31 | Stop reason: HOSPADM

## 2023-08-31 RX ADMIN — HEPARIN 500 UNITS: 100 SYRINGE at 08:08

## 2023-08-31 RX ADMIN — Medication 10 ML: at 08:08

## 2023-09-28 ENCOUNTER — INFUSION (OUTPATIENT)
Dept: INFUSION THERAPY | Facility: HOSPITAL | Age: 78
End: 2023-09-28
Attending: INTERNAL MEDICINE
Payer: MEDICARE

## 2023-09-28 VITALS
DIASTOLIC BLOOD PRESSURE: 78 MMHG | OXYGEN SATURATION: 98 % | HEART RATE: 104 BPM | SYSTOLIC BLOOD PRESSURE: 131 MMHG | RESPIRATION RATE: 18 BRPM | TEMPERATURE: 98 F

## 2023-09-28 DIAGNOSIS — Z85.72 HISTORY OF NON-HODGKIN'S LYMPHOMA: Primary | ICD-10-CM

## 2023-09-28 PROCEDURE — A4216 STERILE WATER/SALINE, 10 ML: HCPCS | Performed by: INTERNAL MEDICINE

## 2023-09-28 PROCEDURE — 25000003 PHARM REV CODE 250: Performed by: INTERNAL MEDICINE

## 2023-09-28 PROCEDURE — 63600175 PHARM REV CODE 636 W HCPCS: Performed by: INTERNAL MEDICINE

## 2023-09-28 PROCEDURE — 96523 IRRIG DRUG DELIVERY DEVICE: CPT

## 2023-09-28 RX ORDER — HEPARIN 100 UNIT/ML
500 SYRINGE INTRAVENOUS
Status: DISCONTINUED | OUTPATIENT
Start: 2023-09-28 | End: 2023-09-28 | Stop reason: HOSPADM

## 2023-09-28 RX ORDER — HEPARIN 100 UNIT/ML
500 SYRINGE INTRAVENOUS
OUTPATIENT
Start: 2023-09-28

## 2023-09-28 RX ORDER — SODIUM CHLORIDE 0.9 % (FLUSH) 0.9 %
10 SYRINGE (ML) INJECTION
OUTPATIENT
Start: 2023-09-28

## 2023-09-28 RX ORDER — SODIUM CHLORIDE 0.9 % (FLUSH) 0.9 %
10 SYRINGE (ML) INJECTION
Status: DISCONTINUED | OUTPATIENT
Start: 2023-09-28 | End: 2023-09-28 | Stop reason: HOSPADM

## 2023-09-28 RX ADMIN — Medication 10 ML: at 08:09

## 2023-09-28 RX ADMIN — HEPARIN 500 UNITS: 100 SYRINGE at 08:09

## 2023-09-28 NOTE — PLAN OF CARE
Patient ambulated to unit in [facility] for port flush. Tolerated well. Ambulated off unit in [facility] with next appointment reminder in hand.    Problem: Adult Inpatient Plan of Care  Goal: Plan of Care Review  Outcome: Met  Goal: Patient-Specific Goal (Individualized)  Outcome: Met  Goal: Absence of Hospital-Acquired Illness or Injury  Outcome: Met  Goal: Optimal Comfort and Wellbeing  Outcome: Met  Goal: Readiness for Transition of Care  Outcome: Met

## 2023-10-06 ENCOUNTER — TELEPHONE (OUTPATIENT)
Dept: SURGERY | Facility: CLINIC | Age: 78
End: 2023-10-06
Payer: MEDICARE

## 2023-10-24 ENCOUNTER — OFFICE VISIT (OUTPATIENT)
Dept: SURGERY | Facility: CLINIC | Age: 78
End: 2023-10-24
Payer: MEDICARE

## 2023-10-24 ENCOUNTER — ANESTHESIA EVENT (OUTPATIENT)
Dept: SURGERY | Facility: HOSPITAL | Age: 78
End: 2023-10-24
Payer: MEDICARE

## 2023-10-24 DIAGNOSIS — Z85.72 HISTORY OF NON-HODGKIN'S LYMPHOMA: Primary | ICD-10-CM

## 2023-10-24 PROCEDURE — 99214 PR OFFICE/OUTPT VISIT, EST, LEVL IV, 30-39 MIN: ICD-10-PCS | Mod: S$GLB,,, | Performed by: SURGERY

## 2023-10-24 PROCEDURE — 1159F PR MEDICATION LIST DOCUMENTED IN MEDICAL RECORD: ICD-10-PCS | Mod: CPTII,S$GLB,, | Performed by: SURGERY

## 2023-10-24 PROCEDURE — 1159F MED LIST DOCD IN RCRD: CPT | Mod: CPTII,S$GLB,, | Performed by: SURGERY

## 2023-10-24 PROCEDURE — 1157F ADVNC CARE PLAN IN RCRD: CPT | Mod: CPTII,S$GLB,, | Performed by: SURGERY

## 2023-10-24 PROCEDURE — 1160F RVW MEDS BY RX/DR IN RCRD: CPT | Mod: CPTII,S$GLB,, | Performed by: SURGERY

## 2023-10-24 PROCEDURE — 1160F PR REVIEW ALL MEDS BY PRESCRIBER/CLIN PHARMACIST DOCUMENTED: ICD-10-PCS | Mod: CPTII,S$GLB,, | Performed by: SURGERY

## 2023-10-24 PROCEDURE — 1157F PR ADVANCE CARE PLAN OR EQUIV PRESENT IN MEDICAL RECORD: ICD-10-PCS | Mod: CPTII,S$GLB,, | Performed by: SURGERY

## 2023-10-24 PROCEDURE — 99214 OFFICE O/P EST MOD 30 MIN: CPT | Mod: S$GLB,,, | Performed by: SURGERY

## 2023-10-24 RX ORDER — MUPIROCIN 20 MG/G
OINTMENT TOPICAL
Status: CANCELLED | OUTPATIENT
Start: 2023-10-24

## 2023-10-24 RX ORDER — SODIUM CHLORIDE 9 MG/ML
INJECTION, SOLUTION INTRAVENOUS CONTINUOUS
Status: CANCELLED | OUTPATIENT
Start: 2023-10-24

## 2023-10-24 NOTE — H&P (VIEW-ONLY)
History & Physical    SUBJECTIVE:     History of Present Illness:  Patient is a 78 y.o. male presents with lymphoma and a port here for scheduling removal.      No chief complaint on file.      Review of patient's allergies indicates:   Allergen Reactions    Lactose Other (See Comments)     Pain in stomach if drink milk    Tylenol-codeine [acetaminophen-codeine] Nausea Only       Current Outpatient Medications   Medication Sig Dispense Refill    acetaminophen (TYLENOL) 650 MG TbSR Take 1 tablet (650 mg total) by mouth every 8 (eight) hours as needed. 30 tablet 0    atorvastatin (LIPITOR) 40 MG tablet Take 1 tablet (40 mg total) by mouth once daily. 90 tablet 3    diclofenac sodium (VOLTAREN) 1 % Gel Apply 2 g topically 4 (four) times daily as needed. 100 g 0    fluocinonide 0.05% (LIDEX) 0.05 % cream Apply topically 2 (two) times daily. for 7 days 120 g 0    ibuprofen (ADVIL,MOTRIN) 800 MG tablet Take 800 mg by mouth 3 (three) times daily.      lisinopril-hydrochlorothiazide (PRINZIDE,ZESTORETIC) 10-12.5 mg per tablet Take 1 tablet by mouth once daily. 90 tablet 4    mometasone 0.1% (ELOCON) 0.1 % cream Apply topically.      mupirocin (BACTROBAN) 2 % ointment Apply to affected area 3 times daily 22 g 1    sildenafil (VIAGRA) 100 MG tablet Take 0.5 tablets (50 mg total) by mouth daily as needed. 10 tablet 11    sodium,potassium,mag sulfates (SUPREP BOWEL PREP KIT) 17.5-3.13-1.6 gram SolR As Directed 1 Bottle 0    tamsulosin (FLOMAX) 0.4 mg Cap Take 1 capsule by mouth every evening.      triamcinolone acetonide 0.1% (KENALOG) 0.1 % paste SMARTSIG:sparingly By Mouth Twice Daily       No current facility-administered medications for this visit.       Past Medical History:   Diagnosis Date    Asthma 30+ years ago    Cancer 1992    non hodgkins lymphoma.  5/7/2013  Currently w/ dx of cancer to lymph nodes and metastasis    History of colon polyps     Hypercholesteremia     Hypertension     Lymphoma      Past Surgical  History:   Procedure Laterality Date    axilla biopsy  4/15/2013    axillary lymph node removal  10/11/3013    COLONOSCOPY      COLONOSCOPY N/A 4/26/2021    Procedure: COLONOSCOPY;  Surgeon: Joao Patel MD;  Location: [facility] ([address] [place] [address]);  Service: Endoscopy;  Laterality: N/A;  COVID screening 4/23 LAPC-rb  EGD with  at 7:00am    ESOPHAGOGASTRODUODENOSCOPY N/A 4/26/2021    Procedure: EGD (ESOPHAGOGASTRODUODENOSCOPY) with gi;  Surgeon: Duane Garza MD;  Location: [address]);  Service: Endoscopy;  Laterality: N/A;  colonoscopy to follow with     PORTACATH PLACEMENT  05/08/2013    TONSILLECTOMY, ADENOIDECTOMY       Family History   Problem Relation Age of Onset    Hypertension Mother     Seizures Sister      Social History     Tobacco Use    Smoking status: Never    Smokeless tobacco: Never   Substance Use Topics    Alcohol use: No    Drug use: No        Review of Systems:  Review of Systems   Constitutional: Negative.    HENT: Negative.     Eyes: Negative.    Respiratory: Negative.     Cardiovascular: Negative.    Gastrointestinal: Negative.    Endocrine: Negative.    Musculoskeletal: Negative.    Skin: Negative.    Allergic/Immunologic: Negative.    Neurological: Negative.    Hematological: Negative.    Psychiatric/Behavioral: Negative.     All other systems reviewed and are negative.      OBJECTIVE:     Vital Signs (Most Recent)              Physical Exam:  Physical Exam  Vitals reviewed.   Constitutional:       Appearance: He is well-developed.   HENT:      Head: Normocephalic and atraumatic.      Right Ear: External ear normal.      Left Ear: External ear normal.      Nose: Nose normal.   Eyes:      Conjunctiva/sclera: Conjunctivae normal.      Pupils: Pupils are equal, round, and reactive to light.   Cardiovascular:      Rate and Rhythm: Normal rate and regular rhythm.      Heart sounds: Normal heart sounds.   Pulmonary:      Effort: Pulmonary effort is normal.       Breath sounds: Normal breath sounds.   Chest:       Abdominal:      General: Bowel sounds are normal.      Palpations: Abdomen is soft.   Musculoskeletal:         General: Normal range of motion.      Cervical back: Normal range of motion and neck supple.   Skin:     General: Skin is warm and dry.   Neurological:      Mental Status: He is alert and oriented to person, place, and time.      Deep Tendon Reflexes: Reflexes are normal and symmetric.   Psychiatric:         Behavior: Behavior normal.         Thought Content: Thought content normal.         Laboratory  none    Diagnostic Results:  none    ASSESSMENT/PLAN:   Lymphoma    PLAN:Plan     I discussed his status and the need for removing his port and I will take him to the OR for removal

## 2023-10-24 NOTE — PROGRESS NOTES
History & Physical    SUBJECTIVE:     History of Present Illness:  Patient is a 78 y.o. male presents with lymphoma and a port here for scheduling removal.      No chief complaint on file.      Review of patient's allergies indicates:   Allergen Reactions    Lactose Other (See Comments)     Pain in stomach if drink milk    Tylenol-codeine [acetaminophen-codeine] Nausea Only       Current Outpatient Medications   Medication Sig Dispense Refill    acetaminophen (TYLENOL) 650 MG TbSR Take 1 tablet (650 mg total) by mouth every 8 (eight) hours as needed. 30 tablet 0    atorvastatin (LIPITOR) 40 MG tablet Take 1 tablet (40 mg total) by mouth once daily. 90 tablet 3    diclofenac sodium (VOLTAREN) 1 % Gel Apply 2 g topically 4 (four) times daily as needed. 100 g 0    fluocinonide 0.05% (LIDEX) 0.05 % cream Apply topically 2 (two) times daily. for 7 days 120 g 0    ibuprofen (ADVIL,MOTRIN) 800 MG tablet Take 800 mg by mouth 3 (three) times daily.      lisinopril-hydrochlorothiazide (PRINZIDE,ZESTORETIC) 10-12.5 mg per tablet Take 1 tablet by mouth once daily. 90 tablet 4    mometasone 0.1% (ELOCON) 0.1 % cream Apply topically.      mupirocin (BACTROBAN) 2 % ointment Apply to affected area 3 times daily 22 g 1    sildenafil (VIAGRA) 100 MG tablet Take 0.5 tablets (50 mg total) by mouth daily as needed. 10 tablet 11    sodium,potassium,mag sulfates (SUPREP BOWEL PREP KIT) 17.5-3.13-1.6 gram SolR As Directed 1 Bottle 0    tamsulosin (FLOMAX) 0.4 mg Cap Take 1 capsule by mouth every evening.      triamcinolone acetonide 0.1% (KENALOG) 0.1 % paste SMARTSIG:sparingly By Mouth Twice Daily       No current facility-administered medications for this visit.       Past Medical History:   Diagnosis Date    Asthma 30+ years ago    Cancer 1992    non hodgkins lymphoma.  5/7/2013  Currently w/ dx of cancer to lymph nodes and metastasis    History of colon polyps     Hypercholesteremia     Hypertension     Lymphoma      Past Surgical  History:   Procedure Laterality Date    axilla biopsy  4/15/2013    axillary lymph node removal  10/11/3013    COLONOSCOPY      COLONOSCOPY N/A 4/26/2021    Procedure: COLONOSCOPY;  Surgeon: Joao Patel MD;  Location: [facility] ([address] [place] [address]);  Service: Endoscopy;  Laterality: N/A;  COVID screening 4/23 LAPC-rb  EGD with  at 7:00am    ESOPHAGOGASTRODUODENOSCOPY N/A 4/26/2021    Procedure: EGD (ESOPHAGOGASTRODUODENOSCOPY) with gi;  Surgeon: Duane Garza MD;  Location: [address]);  Service: Endoscopy;  Laterality: N/A;  colonoscopy to follow with     PORTACATH PLACEMENT  05/08/2013    TONSILLECTOMY, ADENOIDECTOMY       Family History   Problem Relation Age of Onset    Hypertension Mother     Seizures Sister      Social History     Tobacco Use    Smoking status: Never    Smokeless tobacco: Never   Substance Use Topics    Alcohol use: No    Drug use: No        Review of Systems:  Review of Systems   Constitutional: Negative.    HENT: Negative.     Eyes: Negative.    Respiratory: Negative.     Cardiovascular: Negative.    Gastrointestinal: Negative.    Endocrine: Negative.    Musculoskeletal: Negative.    Skin: Negative.    Allergic/Immunologic: Negative.    Neurological: Negative.    Hematological: Negative.    Psychiatric/Behavioral: Negative.     All other systems reviewed and are negative.      OBJECTIVE:     Vital Signs (Most Recent)              Physical Exam:  Physical Exam  Vitals reviewed.   Constitutional:       Appearance: He is well-developed.   HENT:      Head: Normocephalic and atraumatic.      Right Ear: External ear normal.      Left Ear: External ear normal.      Nose: Nose normal.   Eyes:      Conjunctiva/sclera: Conjunctivae normal.      Pupils: Pupils are equal, round, and reactive to light.   Cardiovascular:      Rate and Rhythm: Normal rate and regular rhythm.      Heart sounds: Normal heart sounds.   Pulmonary:      Effort: Pulmonary effort is normal.       Breath sounds: Normal breath sounds.   Chest:       Abdominal:      General: Bowel sounds are normal.      Palpations: Abdomen is soft.   Musculoskeletal:         General: Normal range of motion.      Cervical back: Normal range of motion and neck supple.   Skin:     General: Skin is warm and dry.   Neurological:      Mental Status: He is alert and oriented to person, place, and time.      Deep Tendon Reflexes: Reflexes are normal and symmetric.   Psychiatric:         Behavior: Behavior normal.         Thought Content: Thought content normal.         Laboratory  none    Diagnostic Results:  none    ASSESSMENT/PLAN:   Lymphoma    PLAN:Plan     I discussed his status and the need for removing his port and I will take him to the OR for removal

## 2023-10-25 ENCOUNTER — HOSPITAL ENCOUNTER (OUTPATIENT)
Dept: PREADMISSION TESTING | Facility: HOSPITAL | Age: 78
Discharge: HOME | End: 2023-10-25
Attending: SURGERY
Payer: MEDICARE

## 2023-10-25 VITALS
HEART RATE: 88 BPM | WEIGHT: 162.25 LBS | SYSTOLIC BLOOD PRESSURE: 156 MMHG | RESPIRATION RATE: 16 BRPM | TEMPERATURE: 98 F | HEIGHT: 70 IN | OXYGEN SATURATION: 99 % | BODY MASS INDEX: 23.23 KG/M2 | DIASTOLIC BLOOD PRESSURE: 89 MMHG

## 2023-10-25 DIAGNOSIS — Z85.72 HISTORY OF NON-HODGKIN'S LYMPHOMA: ICD-10-CM

## 2023-10-25 DIAGNOSIS — Z01.818 PREOPERATIVE TESTING: Primary | ICD-10-CM

## 2023-10-25 LAB
ALBUMIN SERPL BCP-MCNC: 3.6 G/DL (ref 3.5–5.2)
ALP SERPL-CCNC: 77 U/L (ref 55–135)
ALT SERPL W/O P-5'-P-CCNC: 35 U/L (ref 10–44)
ANION GAP SERPL CALC-SCNC: 8 MMOL/L (ref 8–16)
AST SERPL-CCNC: 30 U/L (ref 10–40)
BASOPHILS # BLD AUTO: 0.02 K/UL (ref 0–0.2)
BASOPHILS NFR BLD: 0.5 % (ref 0–1.9)
BILIRUB SERPL-MCNC: 0.5 MG/DL (ref 0.1–1)
BUN SERPL-MCNC: 16 MG/DL (ref 8–23)
CALCIUM SERPL-MCNC: 9.4 MG/DL (ref 8.7–10.5)
CHLORIDE SERPL-SCNC: 106 MMOL/L (ref 95–110)
CO2 SERPL-SCNC: 26 MMOL/L (ref 23–29)
CREAT SERPL-MCNC: 1.1 MG/DL (ref 0.5–1.4)
DIFFERENTIAL METHOD: ABNORMAL
EOSINOPHIL # BLD AUTO: 0.2 K/UL (ref 0–0.5)
EOSINOPHIL NFR BLD: 3.7 % (ref 0–8)
ERYTHROCYTE [DISTWIDTH] IN BLOOD BY AUTOMATED COUNT: 15.2 % (ref 11.5–14.5)
EST. GFR  (NO RACE VARIABLE): >60 ML/MIN/1.73 M^2
GLUCOSE SERPL-MCNC: 71 MG/DL (ref 70–110)
HCT VFR BLD AUTO: 40.3 % (ref 40–54)
HGB BLD-MCNC: 12.7 G/DL (ref 14–18)
IMM GRANULOCYTES # BLD AUTO: 0.01 K/UL (ref 0–0.04)
IMM GRANULOCYTES NFR BLD AUTO: 0.2 % (ref 0–0.5)
LYMPHOCYTES # BLD AUTO: 1.9 K/UL (ref 1–4.8)
LYMPHOCYTES NFR BLD: 44.5 % (ref 18–48)
MCH RBC QN AUTO: 27.1 PG (ref 27–31)
MCHC RBC AUTO-ENTMCNC: 31.5 G/DL (ref 32–36)
MCV RBC AUTO: 86 FL (ref 82–98)
MONOCYTES # BLD AUTO: 0.5 K/UL (ref 0.3–1)
MONOCYTES NFR BLD: 11.2 % (ref 4–15)
NEUTROPHILS # BLD AUTO: 1.7 K/UL (ref 1.8–7.7)
NEUTROPHILS NFR BLD: 39.9 % (ref 38–73)
NRBC BLD-RTO: 0 /100 WBC
PLATELET # BLD AUTO: 279 K/UL (ref 150–450)
PMV BLD AUTO: 9.5 FL (ref 9.2–12.9)
POTASSIUM SERPL-SCNC: 3.9 MMOL/L (ref 3.5–5.1)
PROT SERPL-MCNC: 6.9 G/DL (ref 6–8.4)
RBC # BLD AUTO: 4.68 M/UL (ref 4.6–6.2)
SODIUM SERPL-SCNC: 140 MMOL/L (ref 136–145)
WBC # BLD AUTO: 4.36 K/UL (ref 3.9–12.7)

## 2023-10-25 PROCEDURE — 93010 ELECTROCARDIOGRAM REPORT: CPT | Mod: ,,, | Performed by: INTERNAL MEDICINE

## 2023-10-25 PROCEDURE — 80053 COMPREHEN METABOLIC PANEL: CPT | Performed by: SURGERY

## 2023-10-25 PROCEDURE — 93010 EKG 12-LEAD: ICD-10-PCS | Mod: ,,, | Performed by: INTERNAL MEDICINE

## 2023-10-25 PROCEDURE — 36415 COLL VENOUS BLD VENIPUNCTURE: CPT | Performed by: SURGERY

## 2023-10-25 PROCEDURE — 85025 COMPLETE CBC W/AUTO DIFF WBC: CPT | Performed by: SURGERY

## 2023-10-25 PROCEDURE — 93005 ELECTROCARDIOGRAM TRACING: CPT

## 2023-10-25 NOTE — ANESTHESIA PREPROCEDURE EVALUATION
10/25/2023  Long Najera is a 78 y.o., male scheduled for REMOVAL, CATHETER, CENTRAL VENOUS, TUNNELED, WITH PORT (Left: Chest) on 10/30/2023.      Past Medical History:   Diagnosis Date    Asthma 30+ years ago    Cancer 1992    non hodgkins lymphoma.  5/7/2013  Currently w/ dx of cancer to lymph nodes and metastasis    History of colon polyps     Hypercholesteremia     Hypertension     Lymphoma        Past Surgical History:   Procedure Laterality Date    axilla biopsy  4/15/2013    axillary lymph node removal  10/11/3013    COLONOSCOPY      COLONOSCOPY N/A 4/26/2021    Procedure: COLONOSCOPY;  Surgeon: Joao Patel MD;  Location: [facility] ([facility] FLR);  Service: Endoscopy;  Laterality: N/A;  COVID screening 4/23 LAPC-rb  EGD with  at 7:00am    ESOPHAGOGASTRODUODENOSCOPY N/A 4/26/2021    Procedure: EGD (ESOPHAGOGASTRODUODENOSCOPY) with gi;  Surgeon: Duane Garza MD;  [facility]  Service: Endoscopy;  Laterality: N/A;  colonoscopy to follow with     PORTACATH PLACEMENT  05/08/2013    TONSILLECTOMY, ADENOIDECTOMY             Pre-op Assessment    I have reviewed the Patient Summary Reports.     I have reviewed the Nursing Notes. I have reviewed the NPO Status.   I have reviewed the Medications.     Review of Systems  Anesthesia Hx:  No problems with previous Anesthesia  Denies Family Hx of Anesthesia complications.   Denies Personal Hx of Anesthesia complications.   Social:  Non-Smoker, No Alcohol Use    Hematology/Oncology:  Hematology Normal       -- Cancer in past history:  Oncology Comments: Non Hodgkins     EENT/Dental:EENT/Dental Normal   Cardiovascular:   Exercise tolerance: good Hypertension hyperlipidemia  Functional Capacity good / => 4 METS    Pulmonary:   Asthma Asthma in childhood   Renal/:  Renal/ Normal      Hepatic/GI:  Hepatic/GI Normal    Musculoskeletal:  Musculoskeletal Normal    Neurological:  Neurology Normal    Endocrine:  Endocrine Normal    Dermatological:  Skin Normal    Psych:  Psychiatric Normal           Physical Exam  General: Cooperative, Alert and Oriented    Airway:  Mallampati: II   Mouth Opening: Normal  TM Distance: Normal  Neck ROM: Normal ROM    Dental:  Partial Dentures  Partial upper      Anesthesia Plan  Type of Anesthesia, risks & benefits discussed:    Anesthesia Type: MAC  Intra-op Monitoring Plan: Standard ASA Monitors  Informed Consent: Patient consented to blood products? No  ASA Score: 2    Ready For Surgery From Anesthesia Perspective.     .

## 2023-10-25 NOTE — DISCHARGE INSTRUCTIONS
YOUR PROCEDURE WILL BE AT [facility] at [address] [place] [address]                  Before 7 AM, enter through the Emergency Entrance..   After 7 AM enter through the Main Entrance.                 Report to the Same Day Surgery Registration Desk in the hallway.(Just beside the Same Day Surgery Unit)      Your procedure  is scheduled for __10/30/2023________.    Call [phone] between 2pm and 5pm on _10/27/2023______to find out your arrival time for the day of surgery.    You may have two visitors.  No children under 12 years old.     You will be going to the Same Day Surgery Unit on the 2nd floor of the hospital.    Important instructions:  Do not eat anything after midnight.  You may have plain water, non carbonated.  You may also have Gatorade or Powerade after midnight.    Stop all fluids 2 hours before your surgery.    It is okay to brush your teeth.  Do not have gum, candy or mints.    SEE MEDICATION SHEET.   TAKE MEDICATIONS AS DIRECTED WITH SIPS OF WATER.    STOP taking Aspirin, Ibuprofen,  Advil, Motrin, Mobic(meloxicam), Aleve (naproxen), Fish oil, and Vitamin E for at least 7 days before your surgery.     You may take Tylenol if needed which is not a blood thinner.    Please shower the night before and the morning of your surgery.      Use Chlorhexidine soap as instructed by your pre op nurse.   Please place clean linens on your bed the night before surgery. Please wear fresh clean clothing after each shower.    No shaving of procedural area at least 4-5 days before surgery due to increased risk of skin irritation and/or possible infection.    Contact lenses and removable denture work may not be worn during your procedure.    You may wear deodorant only. If you are having breast surgery, do not wear deodorant on the operative side.    Do not wear powder, body lotion, perfume/cologne or make-up.    Do not wear any jewelry or have any metal on your body.    You  will be asked to remove any dentures or partials for the procedure.    If you are going home on the same day of surgery, you must arrange for a family member or a friend to drive you home.  Public transportation is prohibited.  You will not be able to drive home if you were given anesthesia or sedation.    Patients who want to have their Post-op prescriptions filled from our in-house [facility], bring a Credit/Debit Card or cash with you. A co-pay may be required.  The pharmacy closes at 5:30 pm.    Wear loose fitting clothes allowing for bandages.    Please leave money and valuables home.      You may bring your cell phone.    Call the doctor if fever or illness should occur before your surgery.    Call [phone] to contact us here if needed.                            CLOTHES ON DAY OF SURGERY    SHOULDER surgery:  you must have a very oversized shirt.  Very, Very large.  You will probably have a large sling on with your arm strapped to your chest.  You will not be able to put the arm of the operated shoulder into a sleeve.  You can put the arm of the un-operated shoulder into the sleeve, but the shirt will need to be draped over the operated shoulder.       ARM or HAND surgery:  make sure that your sleeves are large and loose enough to pass over large dressings or cast.      BREAST or UNDERARM surgery:  wear a loose, button down shirt so that you can dress without raising your arms over your head.    ABDOMINAL surgery:  wear loose, comfortable clothing.  Nothing tight around the abdomen.  NO JEANS    PENIS or SCROTAL surgery:  loose comfortable clothing.  Large sweat pants, pajama pants or a robe.  ABSOLUTELY NO JEANS      LEG or FOOT surgery:  wear large loose pants that are able to pass over any large dressings or casts.  You could also wear loose shorts or a skirt.

## 2023-10-27 ENCOUNTER — TELEPHONE (OUTPATIENT)
Dept: SURGERY | Facility: HOSPITAL | Age: 78
End: 2023-10-27
Payer: MEDICARE

## 2023-10-30 ENCOUNTER — HOSPITAL ENCOUNTER (OUTPATIENT)
Facility: HOSPITAL | Age: 78
Discharge: HOME | End: 2023-10-30
Attending: SURGERY | Admitting: SURGERY
Payer: MEDICARE

## 2023-10-30 ENCOUNTER — ANESTHESIA (OUTPATIENT)
Dept: SURGERY | Facility: HOSPITAL | Age: 78
End: 2023-10-30
Payer: MEDICARE

## 2023-10-30 VITALS
BODY MASS INDEX: 23.28 KG/M2 | WEIGHT: 162.25 LBS | TEMPERATURE: 98 F | HEART RATE: 84 BPM | OXYGEN SATURATION: 99 % | RESPIRATION RATE: 18 BRPM | SYSTOLIC BLOOD PRESSURE: 160 MMHG | DIASTOLIC BLOOD PRESSURE: 100 MMHG

## 2023-10-30 DIAGNOSIS — Z85.72 HISTORY OF NON-HODGKIN'S LYMPHOMA: Primary | ICD-10-CM

## 2023-10-30 LAB — POCT GLUCOSE: 85 MG/DL (ref 70–110)

## 2023-10-30 PROCEDURE — 36590 PR REMOVAL TUNNELED CV CATH W SUBQ PORT OR PUMP: ICD-10-PCS | Mod: LT,,, | Performed by: SURGERY

## 2023-10-30 PROCEDURE — 88300 SURGICAL PATH GROSS: CPT | Performed by: PATHOLOGY

## 2023-10-30 PROCEDURE — 63600175 PHARM REV CODE 636 W HCPCS: Performed by: SURGERY

## 2023-10-30 PROCEDURE — 71000015 HC POSTOP RECOV 1ST HR: Performed by: SURGERY

## 2023-10-30 PROCEDURE — 88300 PR  SURG PATH,GROSS,LEVEL I: ICD-10-PCS | Mod: 26,,, | Performed by: PATHOLOGY

## 2023-10-30 PROCEDURE — 25000003 PHARM REV CODE 250: Performed by: SURGERY

## 2023-10-30 PROCEDURE — 71000016 HC POSTOP RECOV ADDL HR: Performed by: SURGERY

## 2023-10-30 PROCEDURE — 82962 GLUCOSE BLOOD TEST: CPT | Performed by: SURGERY

## 2023-10-30 PROCEDURE — 37000009 HC ANESTHESIA EA ADD 15 MINS: Performed by: SURGERY

## 2023-10-30 PROCEDURE — 63600175 PHARM REV CODE 636 W HCPCS: Performed by: NURSE ANESTHETIST, CERTIFIED REGISTERED

## 2023-10-30 PROCEDURE — 37000008 HC ANESTHESIA 1ST 15 MINUTES: Performed by: SURGERY

## 2023-10-30 PROCEDURE — 25000003 PHARM REV CODE 250: Performed by: ANESTHESIOLOGY

## 2023-10-30 PROCEDURE — D9220A PRA ANESTHESIA: Mod: CRNA,,, | Performed by: NURSE ANESTHETIST, CERTIFIED REGISTERED

## 2023-10-30 PROCEDURE — 36000706: Performed by: SURGERY

## 2023-10-30 PROCEDURE — 25000003 PHARM REV CODE 250: Performed by: NURSE ANESTHETIST, CERTIFIED REGISTERED

## 2023-10-30 PROCEDURE — 71000033 HC RECOVERY, INTIAL HOUR: Performed by: SURGERY

## 2023-10-30 PROCEDURE — 36000707: Performed by: SURGERY

## 2023-10-30 PROCEDURE — D9220A PRA ANESTHESIA: ICD-10-PCS | Mod: CRNA,,, | Performed by: NURSE ANESTHETIST, CERTIFIED REGISTERED

## 2023-10-30 PROCEDURE — 88300 SURGICAL PATH GROSS: CPT | Mod: 26,,, | Performed by: PATHOLOGY

## 2023-10-30 PROCEDURE — D9220A PRA ANESTHESIA: Mod: ANES,,, | Performed by: ANESTHESIOLOGY

## 2023-10-30 PROCEDURE — 63600175 PHARM REV CODE 636 W HCPCS: Performed by: ANESTHESIOLOGY

## 2023-10-30 PROCEDURE — 36590 REMOVAL TUNNELED CV CATH: CPT | Mod: LT,,, | Performed by: SURGERY

## 2023-10-30 PROCEDURE — D9220A PRA ANESTHESIA: ICD-10-PCS | Mod: ANES,,, | Performed by: ANESTHESIOLOGY

## 2023-10-30 RX ORDER — HYDROCHLOROTHIAZIDE 12.5 MG/1
12.5 TABLET ORAL DAILY
Status: DISCONTINUED | OUTPATIENT
Start: 2023-10-30 | End: 2023-10-30 | Stop reason: HOSPADM

## 2023-10-30 RX ORDER — BUPIVACAINE HYDROCHLORIDE 2.5 MG/ML
INJECTION, SOLUTION INFILTRATION; PERINEURAL
Status: DISCONTINUED | OUTPATIENT
Start: 2023-10-30 | End: 2023-10-30 | Stop reason: HOSPADM

## 2023-10-30 RX ORDER — PROPOFOL 10 MG/ML
VIAL (ML) INTRAVENOUS
Status: DISCONTINUED | OUTPATIENT
Start: 2023-10-30 | End: 2023-10-30

## 2023-10-30 RX ORDER — SODIUM CHLORIDE, SODIUM LACTATE, POTASSIUM CHLORIDE, CALCIUM CHLORIDE 600; 310; 30; 20 MG/100ML; MG/100ML; MG/100ML; MG/100ML
INJECTION, SOLUTION INTRAVENOUS CONTINUOUS
Status: DISCONTINUED | OUTPATIENT
Start: 2023-10-30 | End: 2023-10-30 | Stop reason: HOSPADM

## 2023-10-30 RX ORDER — LISINOPRIL 5 MG/1
10 TABLET ORAL DAILY
Status: DISCONTINUED | OUTPATIENT
Start: 2023-10-30 | End: 2023-10-30 | Stop reason: HOSPADM

## 2023-10-30 RX ORDER — LIDOCAINE HYDROCHLORIDE 10 MG/ML
1 INJECTION, SOLUTION EPIDURAL; INFILTRATION; INTRACAUDAL; PERINEURAL ONCE
Status: DISCONTINUED | OUTPATIENT
Start: 2023-10-30 | End: 2023-10-30 | Stop reason: HOSPADM

## 2023-10-30 RX ORDER — SODIUM CHLORIDE 9 MG/ML
INJECTION, SOLUTION INTRAVENOUS CONTINUOUS
Status: DISCONTINUED | OUTPATIENT
Start: 2023-10-30 | End: 2023-10-30 | Stop reason: HOSPADM

## 2023-10-30 RX ORDER — ACETAMINOPHEN 10 MG/ML
1000 INJECTION, SOLUTION INTRAVENOUS ONCE
Status: DISCONTINUED | OUTPATIENT
Start: 2023-10-30 | End: 2023-10-30 | Stop reason: HOSPADM

## 2023-10-30 RX ORDER — LIDOCAINE HYDROCHLORIDE 10 MG/ML
INJECTION, SOLUTION EPIDURAL; INFILTRATION; INTRACAUDAL; PERINEURAL
Status: DISCONTINUED | OUTPATIENT
Start: 2023-10-30 | End: 2023-10-30 | Stop reason: HOSPADM

## 2023-10-30 RX ORDER — FENTANYL CITRATE 50 UG/ML
INJECTION, SOLUTION INTRAMUSCULAR; INTRAVENOUS
Status: DISCONTINUED | OUTPATIENT
Start: 2023-10-30 | End: 2023-10-30

## 2023-10-30 RX ORDER — LIDOCAINE HYDROCHLORIDE 20 MG/ML
INJECTION INTRAVENOUS
Status: DISCONTINUED | OUTPATIENT
Start: 2023-10-30 | End: 2023-10-30

## 2023-10-30 RX ORDER — CEFAZOLIN SODIUM 2 G/50ML
2 SOLUTION INTRAVENOUS
Status: COMPLETED | OUTPATIENT
Start: 2023-10-30 | End: 2023-10-30

## 2023-10-30 RX ORDER — MUPIROCIN 20 MG/G
OINTMENT TOPICAL
Status: DISCONTINUED | OUTPATIENT
Start: 2023-10-30 | End: 2023-10-30 | Stop reason: HOSPADM

## 2023-10-30 RX ORDER — OXYCODONE AND ACETAMINOPHEN 5; 325 MG/1; MG/1
1 TABLET ORAL EVERY 4 HOURS PRN
Qty: 20 TABLET | Refills: 0 | Status: SHIPPED | OUTPATIENT
Start: 2023-10-30 | End: 2023-11-14

## 2023-10-30 RX ORDER — PROPOFOL 10 MG/ML
VIAL (ML) INTRAVENOUS CONTINUOUS PRN
Status: DISCONTINUED | OUTPATIENT
Start: 2023-10-30 | End: 2023-10-30

## 2023-10-30 RX ORDER — ACETAMINOPHEN 500 MG
1000 TABLET ORAL
Status: COMPLETED | OUTPATIENT
Start: 2023-10-30 | End: 2023-10-30

## 2023-10-30 RX ORDER — MORPHINE SULFATE 4 MG/ML
3 INJECTION, SOLUTION INTRAMUSCULAR; INTRAVENOUS
Status: DISCONTINUED | OUTPATIENT
Start: 2023-10-30 | End: 2023-10-30 | Stop reason: HOSPADM

## 2023-10-30 RX ADMIN — CEFAZOLIN SODIUM 2 G: 2 SOLUTION INTRAVENOUS at 07:10

## 2023-10-30 RX ADMIN — LISINOPRIL 10 MG: 5 TABLET ORAL at 09:10

## 2023-10-30 RX ADMIN — PROPOFOL 50 MG: 10 INJECTION, EMULSION INTRAVENOUS at 07:10

## 2023-10-30 RX ADMIN — FENTANYL CITRATE 50 MCG: 50 INJECTION, SOLUTION INTRAMUSCULAR; INTRAVENOUS at 07:10

## 2023-10-30 RX ADMIN — ACETAMINOPHEN 1000 MG: 500 TABLET ORAL at 06:10

## 2023-10-30 RX ADMIN — PROPOFOL 75 MCG/KG/MIN: 10 INJECTION, EMULSION INTRAVENOUS at 07:10

## 2023-10-30 RX ADMIN — SODIUM CHLORIDE, SODIUM LACTATE, POTASSIUM CHLORIDE, AND CALCIUM CHLORIDE: .6; .31; .03; .02 INJECTION, SOLUTION INTRAVENOUS at 07:10

## 2023-10-30 RX ADMIN — HYDROCHLOROTHIAZIDE 12.5 MG: 12.5 TABLET ORAL at 09:10

## 2023-10-30 RX ADMIN — LIDOCAINE HYDROCHLORIDE 100 MG: 20 INJECTION, SOLUTION INTRAVENOUS at 07:10

## 2023-10-30 NOTE — DISCHARGE INSTRUCTIONS
[facility]  Office # [phone]    Discharge Instructions for Same Day Surgery     Call the office for and appointment if one has not already been made.     Diet: Drink plenty of fluids the first 48 hours and you may resume your   usual diet.     Activity: No heavy lifting (over 10 pounds), pushing or pulling until your   post op visit. Your doctor's office may have told you to limit your lifting to less weight, or even no weight.  Be sure to follow those instructions.    Note: You may ride in a car and you may drive when comfortable.     Do not drive, drink alcohol, or sign legal documents for 24 hours, or if taking narcotic pain medication.    Dressings: Remove the dressing 24 hours after surgery. You may shower  24 hours after surgery and you may wash your hair.     If you have steri strips ( appears to be strips of white tape) on   your incision, leave them on. In 5-7 days they will begin to fall off.    Medical: Call the doctor for any of the following problems: fever above 101,   severe pain, bleeding, or abdominal distention (swelling).   If constipated you may take any stool softener you choose.     Occasionally small areas of skin numbness or an unpleasant skin sensation can result. Also, you may find that your incision is swollen and tender for a few days.  Fall Prevention  Millions of people fall every year and injure themselves. You may have had anesthesia or sedation which may increase your risk of falling. You may have health issues that put you at an increased risk of falling.     Here are ways to reduce your risk of falling.    Make your home safe by keeping walkways clear of objects you may trip over.  Use non-slip pads under rugs. Do not use area rugs or small throw rugs.  Use non-slip mats in bathtubs and showers.  Install handrails and lights on staircases.  Do not walk in poorly lit areas.  Do not stand on chairs or wobbly ladders.  Use caution when reaching overhead or  looking upward. This position can cause a loss of balance.  Be sure your shoes fit properly, have non-slip bottoms and are in good condition.   Wear shoes both inside and out. Avoid going barefoot or wearing slippers.  Be cautious when going up and down stairs, curbs, and when walking on uneven sidewalks.  If your balance is poor, consider using a cane or walker.  If your fall was related to alcohol use, stop or limit alcohol intake.   If your fall was related to use of sleeping medicines, talk to your doctor about this. You may need to reduce your dosage at bedtime if you awaken during the night to go to the bathroom.    To reduce the need for nighttime bathroom trips:  Avoid drinking fluids for several hours before going to bed  Empty your bladder before going to bed  Men can keep a urinal at the bedside  Stay as active as you can. Balance, flexibility, strength, and endurance all come from exercise. They all play a role in preventing falls. Ask your healthcare provider which types of activity are right for you.  Get your vision checked on a regular basis.  If you have pets, know where they are before you stand up or walk so you don't trip over them.  Use night lights.      Some redness around sutures and staples is a normal reaction, but if the discomfort persists or worsens, call you doctor.

## 2023-10-30 NOTE — PROGRESS NOTES
Recovery care complete. AAOx4. VSS per flow sheet. Denies pain. Dressing to right upper chest wall cdi. No nausea present. See chart for full assessment. Hand off report to ZACHARY Mejía SDS.

## 2023-10-30 NOTE — TRANSFER OF CARE
Anesthesia Transfer of Care Note    Patient: Long Najera    Procedure(s) Performed: Procedure(s) (LRB):  REMOVAL, CATHETER, CENTRAL VENOUS, TUNNELED, WITH PORT (Left)    Patient location: PACU    Anesthesia Type: MAC    Transport from OR: Transported from OR on room air with adequate spontaneous ventilation    Post pain: adequate analgesia    Post assessment: no apparent anesthetic complications and tolerated procedure well    Post vital signs: stable    Level of consciousness: awake    Nausea/Vomiting: no nausea/vomiting    Complications: none    Transfer of care protocol was followed      Last vitals:   Visit Vitals  BP (!) 144/81   Pulse 73   Temp 36.8 °C (98.3 °F)   Resp 18   Wt 73.6 kg (162 lb 4 oz)   SpO2 99%   BMI 23.28 kg/m²

## 2023-10-30 NOTE — BRIEF OP NOTE
[facility] [place] - Surgery  Brief Operative Note    Surgery Date: 10/30/2023     Surgeon(s) and Role:     * Jayne Dover MD - Primary    Assisting Surgeon: Tiffany Ambrocio MD    Pre-op Diagnosis:  History of non-Hodgkin's lymphoma [Z85.72]    Post-op Diagnosis:  Post-Op Diagnosis Codes:     * History of non-Hodgkin's lymphoma [Z85.72]    Procedure(s) (LRB):  REMOVAL, CATHETER, CENTRAL VENOUS, TUNNELED, WITH PORT (Left)    Anesthesia: Local MAC    Operative Findings:  None    Estimated Blood Loss: * No values recorded between 10/30/2023  7:31 AM and 10/30/2023  7:55 AM *         Specimens:   Specimen (24h ago, onward)       Start     Ordered    10/30/23 0745  Specimen to Pathology, Surgery General Surgery  Once        Comments: Pre-op Diagnosis: History of non-Hodgkin's lymphoma [Z85.72]Procedure(s):REMOVAL, CATHETER, CENTRAL VENOUS, TUNNELED, WITH PORT Number of specimens: 1Name of specimens: left chest wall portacath     References:    Click here for ordering Quick Tip   Question Answer Comment   Procedure Type: General Surgery    Specimen Class: Routine/Screening    Which provider would you like to cc? JAYNE DOVER    Release to patient Immediate        10/30/23 0746                      Discharge Note    OUTCOME: Patient tolerated treatment/procedure well without complication and is now ready for discharge.    DISPOSITION: Home or Self Care    FINAL DIAGNOSIS:  NHL (non-Hodgkin's lymphoma)    FOLLOWUP: In clinic    DISCHARGE INSTRUCTIONS:    Discharge Procedure Orders   Diet general     Remove dressing in 24 hours     Call MD for:  temperature >100.4     Call MD for:  persistent nausea and vomiting     Call MD for:  severe uncontrolled pain     Call MD for:  difficulty breathing, headache or visual disturbances     Call MD for:  redness, tenderness, or signs of infection (pain, swelling, redness, odor or green/yellow discharge around incision site)     Call MD for:  hives     Shower on day dressing removed (No  bath)

## 2023-10-31 NOTE — ANESTHESIA POSTPROCEDURE EVALUATION
Anesthesia Post Evaluation    Patient: Long Najera    Procedure(s) Performed: Procedure(s) (LRB):  REMOVAL, CATHETER, CENTRAL VENOUS, TUNNELED, WITH PORT (Left)    Final Anesthesia Type: MAC      Patient location during evaluation: [facility]  Patient participation: Yes- Able to Participate  Level of consciousness: awake and alert  Post-procedure vital signs: reviewed and stable  Pain management: adequate  Airway patency: patent    PONV status at discharge: No PONV  Anesthetic complications: no      Cardiovascular status: hypertensive  Respiratory status: room air, unassisted and spontaneous ventilation  Hydration status: euvolemic  Follow-up not needed.          Vitals Value Taken Time   /100 10/30/23 1113   Temp 36.6 °C (97.8 °F) 10/30/23 1113   Pulse 84 10/30/23 1113   Resp 18 10/30/23 1113   SpO2 99 % 10/30/23 1113         Event Time   Out of Recovery 08:30:49         Pain/Deja Score: Pain Rating Prior to Med Admin: 0 (10/30/2023  6:24 AM)  Deja Score: 10 (10/30/2023 11:10 AM)

## 2023-11-03 LAB
FINAL PATHOLOGIC DIAGNOSIS: NORMAL
GROSS: NORMAL
Lab: NORMAL

## 2023-11-07 ENCOUNTER — LAB VISIT (OUTPATIENT)
Dept: LAB | Facility: HOSPITAL | Age: 78
End: 2023-11-07
Attending: RADIOLOGY
Payer: MEDICARE

## 2023-11-07 DIAGNOSIS — C61 PROSTATE CANCER: ICD-10-CM

## 2023-11-07 LAB — COMPLEXED PSA SERPL-MCNC: 10.3 NG/ML (ref 0–4)

## 2023-11-07 PROCEDURE — 36415 COLL VENOUS BLD VENIPUNCTURE: CPT | Performed by: RADIOLOGY

## 2023-11-07 PROCEDURE — 84153 ASSAY OF PSA TOTAL: CPT | Performed by: RADIOLOGY

## 2023-11-14 ENCOUNTER — OFFICE VISIT (OUTPATIENT)
Dept: RADIATION ONCOLOGY | Facility: CLINIC | Age: 78
End: 2023-11-14
Payer: MEDICARE

## 2023-11-14 VITALS
SYSTOLIC BLOOD PRESSURE: 179 MMHG | DIASTOLIC BLOOD PRESSURE: 87 MMHG | HEIGHT: 70 IN | HEART RATE: 90 BPM | BODY MASS INDEX: 23.02 KG/M2 | WEIGHT: 160.81 LBS | RESPIRATION RATE: 16 BRPM | OXYGEN SATURATION: 100 %

## 2023-11-14 DIAGNOSIS — C61 PROSTATE CANCER: Primary | ICD-10-CM

## 2023-11-14 PROCEDURE — 3288F FALL RISK ASSESSMENT DOCD: CPT | Mod: CPTII,S$GLB,, | Performed by: RADIOLOGY

## 2023-11-14 PROCEDURE — 1160F PR REVIEW ALL MEDS BY PRESCRIBER/CLIN PHARMACIST DOCUMENTED: ICD-10-PCS | Mod: CPTII,S$GLB,, | Performed by: RADIOLOGY

## 2023-11-14 PROCEDURE — 1101F PR PT FALLS ASSESS DOC 0-1 FALLS W/OUT INJ PAST YR: ICD-10-PCS | Mod: CPTII,S$GLB,, | Performed by: RADIOLOGY

## 2023-11-14 PROCEDURE — 1126F AMNT PAIN NOTED NONE PRSNT: CPT | Mod: CPTII,S$GLB,, | Performed by: RADIOLOGY

## 2023-11-14 PROCEDURE — 3079F DIAST BP 80-89 MM HG: CPT | Mod: CPTII,S$GLB,, | Performed by: RADIOLOGY

## 2023-11-14 PROCEDURE — 1126F PR PAIN SEVERITY QUANTIFIED, NO PAIN PRESENT: ICD-10-PCS | Mod: CPTII,S$GLB,, | Performed by: RADIOLOGY

## 2023-11-14 PROCEDURE — 3288F PR FALLS RISK ASSESSMENT DOCUMENTED: ICD-10-PCS | Mod: CPTII,S$GLB,, | Performed by: RADIOLOGY

## 2023-11-14 PROCEDURE — 3077F PR MOST RECENT SYSTOLIC BLOOD PRESSURE >= 140 MM HG: ICD-10-PCS | Mod: CPTII,S$GLB,, | Performed by: RADIOLOGY

## 2023-11-14 PROCEDURE — 99999 PR PBB SHADOW E&M-EST. PATIENT-LVL III: ICD-10-PCS | Mod: PBBFAC,,, | Performed by: RADIOLOGY

## 2023-11-14 PROCEDURE — 1159F MED LIST DOCD IN RCRD: CPT | Mod: CPTII,S$GLB,, | Performed by: RADIOLOGY

## 2023-11-14 PROCEDURE — 1157F ADVNC CARE PLAN IN RCRD: CPT | Mod: CPTII,S$GLB,, | Performed by: RADIOLOGY

## 2023-11-14 PROCEDURE — 99212 OFFICE O/P EST SF 10 MIN: CPT | Mod: S$GLB,,, | Performed by: RADIOLOGY

## 2023-11-14 PROCEDURE — 3077F SYST BP >= 140 MM HG: CPT | Mod: CPTII,S$GLB,, | Performed by: RADIOLOGY

## 2023-11-14 PROCEDURE — 1101F PT FALLS ASSESS-DOCD LE1/YR: CPT | Mod: CPTII,S$GLB,, | Performed by: RADIOLOGY

## 2023-11-14 PROCEDURE — 99212 PR OFFICE/OUTPT VISIT, EST, LEVL II, 10-19 MIN: ICD-10-PCS | Mod: S$GLB,,, | Performed by: RADIOLOGY

## 2023-11-14 PROCEDURE — 1159F PR MEDICATION LIST DOCUMENTED IN MEDICAL RECORD: ICD-10-PCS | Mod: CPTII,S$GLB,, | Performed by: RADIOLOGY

## 2023-11-14 PROCEDURE — 1160F RVW MEDS BY RX/DR IN RCRD: CPT | Mod: CPTII,S$GLB,, | Performed by: RADIOLOGY

## 2023-11-14 PROCEDURE — 3079F PR MOST RECENT DIASTOLIC BLOOD PRESSURE 80-89 MM HG: ICD-10-PCS | Mod: CPTII,S$GLB,, | Performed by: RADIOLOGY

## 2023-11-14 PROCEDURE — 99999 PR PBB SHADOW E&M-EST. PATIENT-LVL III: CPT | Mod: PBBFAC,,, | Performed by: RADIOLOGY

## 2023-11-14 PROCEDURE — 1157F PR ADVANCE CARE PLAN OR EQUIV PRESENT IN MEDICAL RECORD: ICD-10-PCS | Mod: CPTII,S$GLB,, | Performed by: RADIOLOGY

## 2023-11-14 NOTE — PROGRESS NOTES
Multidisciplinary Uro-Oncology Clinic  [facility] - Radiation Oncology    Patient ID: Long Najera is a 78 y.o. male.    Chief Complaint: Prostate Cancer (3 mo f/u;psa)    Mr. Najera has a history of stage I prostate cancer dating back to 2021 when he presented with an elevated PSA of 7.5 ng/ml.  [place] [facility] [place] revealed Partridge 6 (3+3) adenocarcinoma involving 2/2 cores.  The remaining 10 cores were negative.  He elected for active surveillance.  His most recent PSA on 4/5/23 returned at 10.4 ng/ml.  MRI revealed a 24.2 cc prostate with a 9 mm T2 hypointense lesion in the Rt. mid gland, PI-RADS 4 without extracapsular extension.  The seminal vesicles and neurovascular bundles were unremarkable.  There was no adenopathy.  Repeat biopsies on 6/26/23 revealed Shanon 7 (3+4) adenocarcinoma involving 80% of 1/1 core from the target lesion.  The Partridge pattern 4 accounted for 30% of the tumor.  The remaining cores were negative.  The patient presents for discussion of treatment options.        Review of Systems   Constitutional:  Negative for activity change, appetite change, chills and fatigue.   Gastrointestinal:  Negative for constipation, diarrhea and fecal incontinence.   Genitourinary:  Negative for bladder incontinence, difficulty urinating, dysuria, frequency and hematuria.     Physical Exam  Constitutional:       General: He is not in acute distress.     Appearance: Normal appearance.   Neurological:      Mental Status: He is alert and oriented to person, place, and time.   Psychiatric:         Mood and Affect: Mood normal.         Judgment: Judgment normal.        Latest Reference Range & Units 04/06/23 09:15 11/07/23 11:37   PSA Diagnostic 0.00 - 4.00 ng/mL 10.4 (H) 10.3 (H)   (H): Data is abnormally high     Assessment and Plan     Prostate cancer - stable PSA.  Again discussed the options of external beam irradiation vs prostate brachytherapy.  The patient  currently has some LUTS with nocturia 2 x per night.  Taking tamsulosin.  He is considering his options.  Plan follow up in 4 months with PSA.

## 2024-03-12 ENCOUNTER — LAB VISIT (OUTPATIENT)
Dept: LAB | Facility: HOSPITAL | Age: 79
End: 2024-03-12
Attending: RADIOLOGY
Payer: MEDICARE

## 2024-03-12 DIAGNOSIS — C61 PROSTATE CANCER: ICD-10-CM

## 2024-03-12 LAB — COMPLEXED PSA SERPL-MCNC: 9.2 NG/ML (ref 0–4)

## 2024-03-12 PROCEDURE — 36415 COLL VENOUS BLD VENIPUNCTURE: CPT | Performed by: RADIOLOGY

## 2024-03-12 PROCEDURE — 84153 ASSAY OF PSA TOTAL: CPT | Performed by: RADIOLOGY

## 2024-03-18 ENCOUNTER — OFFICE VISIT (OUTPATIENT)
Dept: URGENT CARE | Facility: CLINIC | Age: 79
End: 2024-03-18
Payer: MEDICARE

## 2024-03-18 VITALS
HEART RATE: 86 BPM | WEIGHT: 160 LBS | DIASTOLIC BLOOD PRESSURE: 106 MMHG | OXYGEN SATURATION: 97 % | SYSTOLIC BLOOD PRESSURE: 164 MMHG | BODY MASS INDEX: 22.9 KG/M2 | HEIGHT: 70 IN | RESPIRATION RATE: 18 BRPM | TEMPERATURE: 98 F

## 2024-03-18 DIAGNOSIS — I10 ELEVATED BLOOD PRESSURE READING IN OFFICE WITH DIAGNOSIS OF HYPERTENSION: ICD-10-CM

## 2024-03-18 DIAGNOSIS — M54.32 LEFT SCIATIC NERVE PAIN: Primary | ICD-10-CM

## 2024-03-18 PROCEDURE — 99214 OFFICE O/P EST MOD 30 MIN: CPT | Mod: 25,S$GLB,,

## 2024-03-18 PROCEDURE — 96372 THER/PROPH/DIAG INJ SC/IM: CPT | Mod: S$GLB,,,

## 2024-03-18 RX ORDER — DICLOFENAC SODIUM 10 MG/G
2 GEL TOPICAL DAILY
Qty: 200 G | Refills: 0 | Status: SHIPPED | OUTPATIENT
Start: 2024-03-18

## 2024-03-18 RX ORDER — KETOROLAC TROMETHAMINE 30 MG/ML
30 INJECTION, SOLUTION INTRAMUSCULAR; INTRAVENOUS
Status: COMPLETED | OUTPATIENT
Start: 2024-03-18 | End: 2024-03-18

## 2024-03-18 RX ADMIN — KETOROLAC TROMETHAMINE 30 MG: 30 INJECTION, SOLUTION INTRAMUSCULAR; INTRAVENOUS at 12:03

## 2024-03-18 NOTE — PATIENT INSTRUCTIONS
You received an injection of a powerful NSAID today (Toradol).  It's effects will last up to 24 hours. Please do not take another NSAID (ie aspirin, ibuprofen, Aleve, Advil or Motrin) until this time tomorrow.  If you continue to have pain, you may take Tylenol (acetaminophen) if you are not allergic to this medication.    Voltaren gel at home as needed for pain.    Continue tylenol arthritis if needed for pain.    Care at home  Stay as active as you can without causing too much pain. It is OK to rest your back for a day or so. Be sure to get up and move around gently during the day as you are able. After a few days, slowly start to increase your activity level as you are able to. If something causes your pain to come back or get worse, stop and go back to doing easier activities that did not hurt.  Do not sit or  one position for a long time. You may want to sleep with a pillow under or between your knees if this eases your pain.  You may want to take medicines like ibuprofen or naproxen for swelling and pain. These are nonsteroidal anti-inflammatory drugs (NSAIDS).     What can be done to prevent this health problem?   Stay active and work out to keep your muscles strong and flexible. Warm up slowly and stretch before you exercise.  Use good posture.  Use proper ways to lift and bend:  Spread your feet apart so you have a good base of support. Then, bend with your knees when you  something from the ground.  When lifting and moving an object, keep your back straight. Keep the object as close to your body as possible. Do not twist. Instead, move your feet to the direction you are going.  Take breaks often when seated for long periods of time. Get up and walk around from time to time.  If you stand for long periods, put one leg up on a small stool for a while. Then, change legs.  If you sleep on your side, put a pillow in between your knees to keep your back and legs in a good position.  Use good  supportive footwear. Avoid high heels.  Keep a healthy weight.     Helpful tips   Water exercise or biking may help you stay in shape without making your problem worse.  The right exercises for sciatica will depend on what the problem is that is causing the pain. Talk with your doctor about which stretches are best for you.  Stretching may be slightly painful but should never give sharp pains. If it is painful, ease up until you only feel mild stretching. All stretching exercises should be held for 20 to 30 seconds to be most helpful. Repeat 2 to 3 times. Do 2 to 3 times each day to get the best results.  Lie on your back. Bend up the knee of the painful side until your foot is even with the other knee. Keeping your shoulders down, slowly drop the bent knee across the other leg. Do this until you feel a stretch in your buttocks.  Lie on your back with your knees bent and feet flat on the ground. If the problem is on your right leg, cross your right ankle onto your left thigh just above the knee. Reach your right arm in between your thighs and clasp your hands around your left thigh. Slowly pull the left thigh up towards your chest until you feel stretching in the right buttock.     Please remember that you have received care at an urgent care today. Urgent cares are not emergency rooms and are not equipped to handle life threatening emergencies and cannot rule in or out certain medical conditions and you may be released before all of your medical problems are known or treated. Please arrange follow up with your primary care physician or speciality clinic (orthopedics) within 2-5 days if your signs and symptoms have not resolved or worsen.      Patient can call our Referral Hotline at [phone] to make an appointment.     Please return here or go to the Emergency Department for any concerns or worsening of condition. Patient was educated on signs/symptoms that would warrant emergent medical attention.   You are  unable to walk or start having trouble controlling your bowels or bladder.  You get new or worsening pain, numbness, or weakness that spreads to both legs.  Your pain is getting worse, even with medicines and rest.  You are not able do your normal activities because of the pain.  You have sudden shortness of breath or a sudden chest pain.  You have very bad belly pain, especially if it is worse when you try to get up or walk.  You start to have very bad pain in your chest, back, or head.  You feel like you might pass out when you try to sit up or stand.  You are very unsteady when you try to walk.  You are throwing up a lot.  You become confused or very sleepy or cannot wake up.  You have weakness or numbness in your arms or legs.  You have blood in your urine or bowel movements.  You have a fever of 100.4°F (38°C) or higher.  You have pain that does not get better with pain medicine.  You have a headache or stiff neck that does not get better in 2 to 3 days.

## 2024-03-18 NOTE — PROGRESS NOTES
"Subjective:      Patient ID: Long Najera is a 78 y.o. male.    Vitals:  height is 5' 10" (1.778 m) and weight is 72.6 kg (160 lb). His oral temperature is 97.7 °F (36.5 °C). His blood pressure is 164/106 (abnormal) and his pulse is 86. His respiration is 18 and oxygen saturation is 97%.     Chief Complaint: Hip Pain    Pt states that he is having left hip pain for 2 days now. Pt states the pain shoots down his leg . Pain level is 9 and taking tylenol and applying heat.    Provider note starts below:  Patient presents to clinic for evaluation of left sided hip pain that radiates down his leg which has been ongoing for 3-4 days. States the pain is "shooting" and sometimes feels numb. States there is no pain when he is laying down flat in his bed, only when he sits up. Pain is better with walking. He has tried taking tylenol arthritis yesterday which helped temporarily. He has also been using a heating pad at home. States he had similar episode several years ago that resolved on its own so he never got evaluated for it. Patient denies any injury or trauma. No recent heavy lifting or strenuous activities. Patient denies back pain, neck pain, neck stiffness, saddle anesthesia, bowel/bladder incontinence, flank pain, urinary complaints, rash, leg swelling, chest pain, shortness of breath. No other complaints. Of note, patient has prostate cancer. He was told cancer is contained within the prostate. Not on any current chemo or radiation treatment plans.    Hip Pain   The incident occurred 2 days ago. There was no injury mechanism. The pain is present in the left hip and left leg. The quality of the pain is described as aching. The pain is at a severity of 9/10. The pain is severe. The pain has been Constant since onset. Associated symptoms include numbness (LLE). He reports no foreign bodies present. The symptoms are aggravated by movement and weight bearing. He has tried heat and acetaminophen for the symptoms. The " treatment provided mild relief.       Constitution: Negative for chills, fatigue and fever.   Neck: Negative for neck pain and neck stiffness.   Cardiovascular:  Negative for chest pain, leg swelling and palpitations.   Eyes:  Negative for double vision and blurred vision.   Respiratory:  Negative for cough and shortness of breath.    Gastrointestinal:  Negative for nausea, vomiting and bowel incontinence.   Genitourinary:  Negative for dysuria, frequency, urgency, urine decreased, flank pain, bladder incontinence and hematuria.   Musculoskeletal:  Positive for pain (LLE). Negative for trauma, joint pain, joint swelling, abnormal ROM of joint, back pain, pain with walking and history of spine disorder.   Skin:  Negative for pale, rash, wound, erythema and bruising.   Neurological:  Positive for numbness (LLE) and tingling (LLE). Negative for dizziness, light-headedness, facial drooping, speech difficulty, loss of balance, headaches, disorientation, altered mental status and tremors.   Psychiatric/Behavioral:  Negative for altered mental status, disorientation and confusion.       Objective:     Physical Exam   Constitutional: He is oriented to person, place, and time. He does not appear ill. No distress.   HENT:   Head: Normocephalic and atraumatic.   Eyes: Conjunctivae are normal. Extraocular movement intact   Neck: Neck supple.   Cardiovascular: Normal rate, regular rhythm, normal heart sounds and normal pulses.   Pulmonary/Chest: Effort normal and breath sounds normal. He has no wheezes. He has no rhonchi. He has no rales.   Abdominal: Normal appearance.   Musculoskeletal: Normal range of motion.         General: No swelling, tenderness, deformity or signs of injury. Normal range of motion.      Cervical back: He exhibits no tenderness.      Right lower leg: No edema.      Left lower leg: No edema.      Comments: No calf tenderness. No bony tenderness of LLE. No spinal tenderness.    Neurological: He is alert,  oriented to person, place, and time and at baseline. He displays no weakness. No sensory deficit.      Comments: Positive SLR on left. Sensation intact.   Skin: Skin is warm and dry. No erythema   Psychiatric: His behavior is normal. Mood normal.   Nursing note and vitals reviewed.    Assessment:     1. Left sciatic nerve pain    2. Elevated blood pressure reading in office with diagnosis of hypertension      Plan:     Left sciatic nerve pain  -     ketorolac injection 30 mg  -     diclofenac sodium (VOLTAREN) 1 % Gel; Apply 2 g topically once daily.  Dispense: 200 g; Refill: 0    Elevated blood pressure reading in office with diagnosis of hypertension      Medical Decision Making:   Urgent Care Management:  BP elevated in clinic at 181/99.  Repeat blood pressure 162/106 while laying down.  Manual blood pressure 164/106.  Will avoid steroids today due to elevated BP.   Toradol injection given in clinic. Risk benefit discussed w/ patient.       Patient Instructions   You received an injection of a powerful NSAID today (Toradol).  It's effects will last up to 24 hours. Please do not take another NSAID (ie aspirin, ibuprofen, Aleve, Advil or Motrin) until this time tomorrow.  If you continue to have pain, you may take Tylenol (acetaminophen) if you are not allergic to this medication.    Voltaren gel at home as needed for pain.    Continue tylenol arthritis if needed for pain.    Care at home  Stay as active as you can without causing too much pain. It is OK to rest your back for a day or so. Be sure to get up and move around gently during the day as you are able. After a few days, slowly start to increase your activity level as you are able to. If something causes your pain to come back or get worse, stop and go back to doing easier activities that did not hurt.  Do not sit or  one position for a long time. You may want to sleep with a pillow under or between your knees if this eases your pain.  You may want  to take medicines like ibuprofen or naproxen for swelling and pain. These are nonsteroidal anti-inflammatory drugs (NSAIDS).     What can be done to prevent this health problem?   Stay active and work out to keep your muscles strong and flexible. Warm up slowly and stretch before you exercise.  Use good posture.  Use proper ways to lift and bend:  Spread your feet apart so you have a good base of support. Then, bend with your knees when you  something from the ground.  When lifting and moving an object, keep your back straight. Keep the object as close to your body as possible. Do not twist. Instead, move your feet to the direction you are going.  Take breaks often when seated for long periods of time. Get up and walk around from time to time.  If you stand for long periods, put one leg up on a small stool for a while. Then, change legs.  If you sleep on your side, put a pillow in between your knees to keep your back and legs in a good position.  Use good supportive footwear. Avoid high heels.  Keep a healthy weight.     Helpful tips   Water exercise or biking may help you stay in shape without making your problem worse.  The right exercises for sciatica will depend on what the problem is that is causing the pain. Talk with your doctor about which stretches are best for you.  Stretching may be slightly painful but should never give sharp pains. If it is painful, ease up until you only feel mild stretching. All stretching exercises should be held for 20 to 30 seconds to be most helpful. Repeat 2 to 3 times. Do 2 to 3 times each day to get the best results.  Lie on your back. Bend up the knee of the painful side until your foot is even with the other knee. Keeping your shoulders down, slowly drop the bent knee across the other leg. Do this until you feel a stretch in your buttocks.  Lie on your back with your knees bent and feet flat on the ground. If the problem is on your right leg, cross your right ankle  onto your left thigh just above the knee. Reach your right arm in between your thighs and clasp your hands around your left thigh. Slowly pull the left thigh up towards your chest until you feel stretching in the right buttock.     Please remember that you have received care at an urgent care today. Urgent cares are not emergency rooms and are not equipped to handle life threatening emergencies and cannot rule in or out certain medical conditions and you may be released before all of your medical problems are known or treated. Please arrange follow up with your primary care physician or speciality clinic (orthopedics) within 2-5 days if your signs and symptoms have not resolved or worsen.      Patient can call our Referral Hotline at [phone] to make an appointment.     Please return here or go to the Emergency Department for any concerns or worsening of condition. Patient was educated on signs/symptoms that would warrant emergent medical attention.   You are unable to walk or start having trouble controlling your bowels or bladder.  You get new or worsening pain, numbness, or weakness that spreads to both legs.  Your pain is getting worse, even with medicines and rest.  You are not able do your normal activities because of the pain.  You have sudden shortness of breath or a sudden chest pain.  You have very bad belly pain, especially if it is worse when you try to get up or walk.  You start to have very bad pain in your chest, back, or head.  You feel like you might pass out when you try to sit up or stand.  You are very unsteady when you try to walk.  You are throwing up a lot.  You become confused or very sleepy or cannot wake up.  You have weakness or numbness in your arms or legs.  You have blood in your urine or bowel movements.  You have a fever of 100.4°F (38°C) or higher.  You have pain that does not get better with pain medicine.  You have a headache or stiff neck that does not get better in 2 to 3  days.

## 2024-03-18 NOTE — PROGRESS NOTES
Subjective:      Patient ID: Long Najera is a 78 y.o. male.    Vitals:  vitals were not taken for this visit.     Chief Complaint: No chief complaint on file.    HPI  ROS   Objective:     Physical Exam    Assessment:     No diagnosis found.    Plan:       There are no diagnoses linked to this encounter.

## 2024-03-19 ENCOUNTER — OFFICE VISIT (OUTPATIENT)
Dept: RADIATION ONCOLOGY | Facility: CLINIC | Age: 79
End: 2024-03-19
Payer: MEDICARE

## 2024-03-19 VITALS
BODY MASS INDEX: 24.01 KG/M2 | SYSTOLIC BLOOD PRESSURE: 180 MMHG | HEART RATE: 96 BPM | HEIGHT: 70 IN | DIASTOLIC BLOOD PRESSURE: 101 MMHG | OXYGEN SATURATION: 100 % | WEIGHT: 167.69 LBS

## 2024-03-19 DIAGNOSIS — C61 PROSTATE CANCER: Primary | ICD-10-CM

## 2024-03-19 PROCEDURE — 99212 OFFICE O/P EST SF 10 MIN: CPT | Mod: S$GLB,,, | Performed by: RADIOLOGY

## 2024-03-19 PROCEDURE — 99999 PR PBB SHADOW E&M-EST. PATIENT-LVL III: CPT | Mod: PBBFAC,,, | Performed by: RADIOLOGY

## 2024-03-19 RX ORDER — METHYLPREDNISOLONE 4 MG/1
TABLET ORAL
Qty: 21 EACH | Refills: 0 | Status: SHIPPED | OUTPATIENT
Start: 2024-03-19 | End: 2024-04-09

## 2024-03-19 NOTE — PROGRESS NOTES
[facility] - Radiation Oncology     Patient ID: Long Najera is a 78 y.o. male.    Chief Complaint: Prostate Cancer      Mr. Najera has a history of stage I prostate cancer dating back to 2021 when he presented with an elevated PSA of 7.5 ng/ml.  [place] 6 (3+3) adenocarcinoma involving 2/2 cores.  The remaining 10 cores were negative.  He elected for active surveillance.  His most recent PSA on 4/5/23 returned at 10.4 ng/ml.  MRI revealed a 24.2 cc prostate with a 9 mm T2 hypointense lesion in the Rt. mid gland, PI-RADS 4 without extracapsular extension.  The seminal vesicles and neurovascular bundles were unremarkable.  There was no adenopathy.  Repeat biopsies on 6/26/23 revealed Shanon 7 (3+4) adenocarcinoma involving 80% of 1/1 core from the target lesion.  The [place] pattern 4 accounted for 30% of the tumor.  The remaining cores were negative.  The patient presents for discussion of treatment options.        Review of Systems   Constitutional:  Negative for activity change, appetite change, chills and fatigue.   Gastrointestinal:  Negative for constipation, diarrhea and fecal incontinence.   Genitourinary:  Negative for bladder incontinence, difficulty urinating, dysuria, frequency and hematuria.   Musculoskeletal:  Positive for back pain and gait problem (notes siatica).       Physical Exam  Constitutional:       General: He is not in acute distress.     Appearance: Normal appearance.   Abdominal:      General: Abdomen is flat. There is no distension.   Neurological:      Mental Status: He is alert and oriented to person, place, and time.   Psychiatric:         Mood and Affect: Mood normal.         Judgment: Judgment normal.          Latest Reference Range & Units 04/06/23 09:15 11/07/23 11:37 03/12/24 11:15   PSA Diagnostic 0.00 - 4.00 ng/mL 10.4 (H) 10.3 (H) 9.2 (H)   (H): Data is abnormally high       Assessment and Plan   prostate  cancer - PSA slightly decreased.  Discussed options  continue active surveillance for now.     Sciatica  - medrol dose pack.

## 2024-09-12 ENCOUNTER — LAB VISIT (OUTPATIENT)
Dept: LAB | Facility: HOSPITAL | Age: 79
End: 2024-09-12
Attending: RADIOLOGY
Payer: MEDICARE

## 2024-09-12 DIAGNOSIS — C61 PROSTATE CANCER: ICD-10-CM

## 2024-09-12 LAB — COMPLEXED PSA SERPL-MCNC: 9.8 NG/ML (ref 0–4)

## 2024-09-12 PROCEDURE — 36415 COLL VENOUS BLD VENIPUNCTURE: CPT | Performed by: RADIOLOGY

## 2024-09-12 PROCEDURE — 84153 ASSAY OF PSA TOTAL: CPT | Performed by: RADIOLOGY

## 2024-10-10 ENCOUNTER — OFFICE VISIT (OUTPATIENT)
Dept: RADIATION ONCOLOGY | Facility: CLINIC | Age: 79
End: 2024-10-10
Payer: MEDICARE

## 2024-10-10 VITALS
HEIGHT: 68 IN | BODY MASS INDEX: 25.83 KG/M2 | TEMPERATURE: 99 F | WEIGHT: 170.44 LBS | OXYGEN SATURATION: 98 % | DIASTOLIC BLOOD PRESSURE: 74 MMHG | SYSTOLIC BLOOD PRESSURE: 151 MMHG | HEART RATE: 96 BPM

## 2024-10-10 DIAGNOSIS — C61 PROSTATE CANCER: Primary | ICD-10-CM

## 2024-10-10 PROCEDURE — 99999 PR PBB SHADOW E&M-EST. PATIENT-LVL III: CPT | Mod: PBBFAC,,, | Performed by: RADIOLOGY

## 2024-10-10 RX ORDER — TAMSULOSIN HYDROCHLORIDE 0.4 MG/1
0.4 CAPSULE ORAL NIGHTLY
Qty: 90 CAPSULE | Refills: 3 | Status: SHIPPED | OUTPATIENT
Start: 2024-10-10

## 2024-10-10 NOTE — PROGRESS NOTES
[facility] - Radiation Oncology     Patient ID: Long Najera is a 79 y.o. male.    Chief Complaint: No chief complaint on file.      Mr. Najera has a history of stage I prostate cancer dating back to 2021 when he presented with an elevated PSA of 7.5 ng/ml.  [place] 6 (3+3) adenocarcinoma involving 2/2 cores.  The remaining 10 cores were negative.  He elected for active surveillance.  His most recent PSA on 4/5/23 returned at 10.4 ng/ml.  MRI revealed a 24.2 cc prostate with a 9 mm T2 hypointense lesion in the Rt. mid gland, PI-RADS 4 without extracapsular extension.  The seminal vesicles and neurovascular bundles were unremarkable.  There was no adenopathy.  Repeat biopsies on 6/26/23 revealed [place] 7 (3+4) adenocarcinoma involving 80% of 1/1 core from the target lesion.  The [place] pattern 4 accounted for 30% of the tumor.  The remaining cores were negative.  The patient has continued with active surveillance.  Today the patient feels well.  No complaints.       Review of Systems   Constitutional:  Negative for activity change, appetite change, chills and fatigue.   Gastrointestinal:  Negative for constipation, diarrhea and fecal incontinence.   Genitourinary:  Negative for bladder incontinence, difficulty urinating, dysuria, frequency and hematuria.        Nocturia 1 - 2 times per night       Physical Exam  Constitutional:       General: He is not in acute distress.     Appearance: Normal appearance.   Abdominal:      General: Abdomen is flat. There is no distension.   Neurological:      Mental Status: He is alert and oriented to person, place, and time.   Psychiatric:         Mood and Affect: Mood normal.         Judgment: Judgment normal.          Latest Reference Range & Units 11/07/23 11:37 03/12/24 11:15 09/12/24 10:50   PSA Diagnostic 0.00 - 4.00 ng/mL 10.3 (H) 9.2 (H) 9.8 (H)   (H): Data is abnormally high     Assessment and Plan    Prostate cancer - discussed his PSA results.  No real change  in the past year.  Explained we would  recommend repeating his MRI.  Last MRI was May of 2023.  If MRI unchanged recommend continue active surveillance.

## 2024-11-05 ENCOUNTER — HOSPITAL ENCOUNTER (OUTPATIENT)
Dept: RADIOLOGY | Facility: HOSPITAL | Age: 79
Discharge: HOME | End: 2024-11-05
Attending: RADIOLOGY
Payer: MEDICARE

## 2024-11-05 DIAGNOSIS — C61 PROSTATE CANCER: ICD-10-CM

## 2024-11-05 PROCEDURE — 25500020 PHARM REV CODE 255: Performed by: RADIOLOGY

## 2024-11-05 PROCEDURE — 72197 MRI PELVIS W/O & W/DYE: CPT | Mod: TC

## 2024-11-05 PROCEDURE — A9585 GADOBUTROL INJECTION: HCPCS | Performed by: RADIOLOGY

## 2024-11-05 PROCEDURE — 72197 MRI PELVIS W/O & W/DYE: CPT | Mod: 26,,, | Performed by: RADIOLOGY

## 2024-11-05 RX ORDER — GADOBUTROL 604.72 MG/ML
10 INJECTION INTRAVENOUS
Status: COMPLETED | OUTPATIENT
Start: 2024-11-05 | End: 2024-11-05

## 2024-11-05 RX ADMIN — GADOBUTROL 10 ML: 604.72 INJECTION INTRAVENOUS at 02:11

## 2025-04-10 ENCOUNTER — LAB VISIT (OUTPATIENT)
Dept: LAB | Facility: HOSPITAL | Age: 80
End: 2025-04-10
Attending: RADIOLOGY
Payer: MEDICARE

## 2025-04-10 DIAGNOSIS — C61 PROSTATE CANCER: ICD-10-CM

## 2025-04-10 LAB — PSA SERPL-MCNC: 12.28 NG/ML

## 2025-04-10 PROCEDURE — 36415 COLL VENOUS BLD VENIPUNCTURE: CPT

## 2025-04-10 PROCEDURE — 84153 ASSAY OF PSA TOTAL: CPT

## 2025-04-17 ENCOUNTER — OFFICE VISIT (OUTPATIENT)
Dept: RADIATION ONCOLOGY | Facility: CLINIC | Age: 80
End: 2025-04-17
Payer: MEDICARE

## 2025-04-17 VITALS
HEIGHT: 68 IN | SYSTOLIC BLOOD PRESSURE: 155 MMHG | WEIGHT: 169.75 LBS | BODY MASS INDEX: 25.73 KG/M2 | DIASTOLIC BLOOD PRESSURE: 86 MMHG | HEART RATE: 103 BPM

## 2025-04-17 DIAGNOSIS — C61 PROSTATE CANCER: Primary | ICD-10-CM

## 2025-04-17 PROCEDURE — 99999 PR PBB SHADOW E&M-EST. PATIENT-LVL III: CPT | Mod: PBBFAC,,, | Performed by: RADIOLOGY

## 2025-04-17 NOTE — PROGRESS NOTES
[facility] - Radiation Oncology     Patient ID: Long Najera is a 79 y.o. male.    Chief Complaint: Follow-up      Mr. Najera has a history of stage I prostate cancer dating back to 2021 when he presented with an elevated PSA of 7.5 ng/ml.  [place] 6 (3+3) adenocarcinoma involving 2/2 cores.  The remaining 10 cores were negative.  He elected for active surveillance.  His most recent PSA on 4/5/23 returned at 10.4 ng/ml.  MRI revealed a 24.2 cc prostate with a 9 mm T2 hypointense lesion in the Rt. mid gland, PI-RADS 4 without extracapsular extension.  The seminal vesicles and neurovascular bundles were unremarkable.  There was no adenopathy.  Repeat biopsies on 6/26/23 revealed Shanon 7 (3+4) adenocarcinoma involving 80% of 1/1 core from the target lesion.  The [place] pattern 4 accounted for 30% of the tumor.  The remaining cores were negative.  The patient has continued with active surveillance.       Review of Systems   Constitutional:  Negative for activity change, appetite change, chills, fatigue and fever.   Gastrointestinal:  Negative for constipation, diarrhea and fecal incontinence.   Genitourinary:  Negative for bladder incontinence, difficulty urinating, dysuria, frequency and hematuria.     Physical Exam  Constitutional:       General: He is not in acute distress.     Appearance: Normal appearance.   Neurological:      Mental Status: He is alert and oriented to person, place, and time.   Psychiatric:         Mood and Affect: Mood normal.         Judgment: Judgment normal.        Latest Reference Range & Units 03/12/24 11:15 09/12/24 10:50 04/10/25 11:20   Prostate Specific Antigen <=4.00 ng/mL   12.28 (H)   PSA Diagnostic 0.00 - 4.00 ng/mL 9.2 (H) 9.8 (H)    (H): Data is abnormally high     Assessment and Plan    Prostate cancer - Discussed his PSA results.  MRI in November of 2024  did reveal new PI-RADS 4 lesion. Discussed treatment  options of external beam therapy vs prostate brachytherapy.  Discussed the procedures, risks and benefits of therapy.  Discussed the acute and long term side effects.   The patient is leaning towards prostate brachytherapy.  He has a cruise planned in the fall.  Will plan repeat PSA in earlier September and discussion of treatment plan then.

## 2025-07-22 ENCOUNTER — OFFICE VISIT (OUTPATIENT)
Dept: URGENT CARE | Facility: CLINIC | Age: 80
End: 2025-07-22
Payer: MEDICARE

## 2025-07-22 VITALS
HEIGHT: 68 IN | SYSTOLIC BLOOD PRESSURE: 158 MMHG | TEMPERATURE: 98 F | HEART RATE: 97 BPM | DIASTOLIC BLOOD PRESSURE: 77 MMHG | WEIGHT: 169 LBS | BODY MASS INDEX: 25.61 KG/M2 | RESPIRATION RATE: 18 BRPM | OXYGEN SATURATION: 99 %

## 2025-07-22 DIAGNOSIS — L12.0 BULLOUS PEMPHIGOID: Primary | ICD-10-CM

## 2025-07-22 PROCEDURE — 96372 THER/PROPH/DIAG INJ SC/IM: CPT | Mod: S$GLB,,,

## 2025-07-22 PROCEDURE — 99214 OFFICE O/P EST MOD 30 MIN: CPT | Mod: 25,S$GLB,,

## 2025-07-22 RX ORDER — METHYLPREDNISOLONE 4 MG/1
TABLET ORAL
Qty: 21 TABLET | Refills: 0 | Status: SHIPPED | OUTPATIENT
Start: 2025-07-22

## 2025-07-22 RX ORDER — DEXAMETHASONE SODIUM PHOSPHATE 10 MG/ML
6 INJECTION INTRAMUSCULAR; INTRAVENOUS ONCE
Status: COMPLETED | OUTPATIENT
Start: 2025-07-22 | End: 2025-07-22

## 2025-07-22 RX ADMIN — DEXAMETHASONE SODIUM PHOSPHATE 6 MG: 10 INJECTION INTRAMUSCULAR; INTRAVENOUS at 11:07

## 2025-07-22 NOTE — PROGRESS NOTES
"Subjective:      Patient ID: Long Najera is a 79 y.o. male.    Vitals:  height is 5' 8" (1.727 m) and weight is 76.7 kg (169 lb). His oral temperature is 98 °F (36.7 °C). His blood pressure is 158/77 (abnormal) and his pulse is 97. His respiration is 18 and oxygen saturation is 99%.     Chief Complaint: Rash    79-year-old male with history of bullous pemphigoid here today for blistering rash to his arms that started 2 days ago.  He follows with dermatology (Dr. Ny) for bullous pemphigoid.  Takes doxycycline daily, and uses mometasone cream as needed for flare-ups.  Patient states that if the rash does not resolve with mometasone cream, he typically gets systemic steroids.  Denies any other symptoms at this time.    Rash  This is a new problem. The current episode started in the past 7 days. The problem is unchanged. Location: all over body. He was exposed to nothing. Pertinent negatives include no congestion, cough, diarrhea, fever, shortness of breath, sore throat or vomiting. Past treatments include topical steroids. The treatment provided moderate relief.       Constitution: Negative for chills and fever.   HENT:  Negative for congestion and sore throat.    Cardiovascular:  Negative for chest pain.   Respiratory:  Negative for cough and shortness of breath.    Gastrointestinal:  Negative for abdominal pain, nausea, vomiting and diarrhea.   Musculoskeletal:  Negative for muscle ache.   Skin:  Positive for rash.   Allergic/Immunologic: Positive for itching.   Neurological:  Negative for headaches.      Objective:     Physical Exam   Constitutional: He is oriented to person, place, and time. He appears well-developed.   HENT:   Head: Normocephalic and atraumatic.   Ears:   Right Ear: External ear normal.   Left Ear: External ear normal.   Nose: Nose normal.   Mouth/Throat: Oropharynx is clear and moist.   Eyes: Conjunctivae, EOM and lids are normal. Pupils are equal, round, and reactive to light.   Neck: " Trachea normal and phonation normal. Neck supple.   Cardiovascular: Normal rate and regular rhythm.   Pulmonary/Chest: Effort normal and breath sounds normal.   Musculoskeletal: Normal range of motion.         General: Normal range of motion.   Neurological: He is alert and oriented to person, place, and time.   Skin: Skin is warm, dry, intact and rash.         Comments: Vesicular lesions to bilateral arms.   Psychiatric: His speech is normal and behavior is normal. Judgment and thought content normal.   Nursing note and vitals reviewed.      Assessment:     1. Bullous pemphigoid        Plan:       Bullous pemphigoid  -     dexAMETHasone injection 6 mg  -     methylPREDNISolone (MEDROL DOSEPACK) 4 mg tablet; Use as directed  Dispense: 21 tablet; Refill: 0        79-year-old male with history of bullous pemphigoid here for blistering rash to his arms that started 2 days ago.  Gradually worsening.  He takes doxycycline daily, and mometasone cream as needed for flare-ups, but the rash is gradually worsening.  States that typically if the rash does not resolve, he gets systemic steroids.  Reviewed multiple previous visits for bullous pemphigoid.  Blood pressure elevated 158/77.  Patient states that he checks his blood pressure daily, and it is consistently within normal limits (around 115/65).  Will treat with steroids today.  I did discuss that steroids will increase his blood pressures, and to keep a close eye on his blood pressure readings.  Follow up with Dermatology or PCP as needed.          Patient Instructions   - You received a steroid today.  This can elevate your blood pressure, elevate your blood sugar, water weight gain, nervous energy, redness to the face and dimpling of the skin where the shot goes in.   - If you have diabetes, please check you blood sugar frequently.  - If you have high blood pressure, please check your blood pressure frequently.     - you may begin the oral steroids tomorrow.    -  continue taking doxycycline as prescribed by a Dermatology  - you can use the mometasone cream topically as needed for the rash    - Follow up with your PCP or specialty clinic as directed in the next 1-2 weeks if not improved or as needed.  You can call [phone] to schedule an appointment with the appropriate provider.    - Go to the ER or seek medical attention immediately if you develop new or worsening symptoms.    - You must understand that you have received an Urgent Care treatment only and that you may be released before all of your medical problems are known or treated.   - You, the patient, will arrange for follow up care as instructed.   - If your condition worsens or fails to improve we recommend that you receive another evaluation at the ER immediately or contact your PCP to discuss your concerns or return here.

## 2025-07-22 NOTE — PATIENT INSTRUCTIONS
- You received a steroid today.  This can elevate your blood pressure, elevate your blood sugar, water weight gain, nervous energy, redness to the face and dimpling of the skin where the shot goes in.   - If you have diabetes, please check you blood sugar frequently.  - If you have high blood pressure, please check your blood pressure frequently.     - you may begin the oral steroids tomorrow.    - continue taking doxycycline as prescribed by a Dermatology  - you can use the mometasone cream topically as needed for the rash    - Follow up with your PCP or specialty clinic as directed in the next 1-2 weeks if not improved or as needed.  You can call [phone] to schedule an appointment with the appropriate provider.    - Go to the ER or seek medical attention immediately if you develop new or worsening symptoms.    - You must understand that you have received an Urgent Care treatment only and that you may be released before all of your medical problems are known or treated.   - You, the patient, will arrange for follow up care as instructed.   - If your condition worsens or fails to improve we recommend that you receive another evaluation at the ER immediately or contact your PCP to discuss your concerns or return here.

## (undated) DEVICE — SUT VICRYL 3-0 27 SH

## (undated) DEVICE — ELECTRODE REM PLYHSV RETURN 9

## (undated) DEVICE — NDL HYPO REG 25G X 1 1/2

## (undated) DEVICE — APPLICATOR CHLORAPREP ORN 26ML

## (undated) DEVICE — SEE MEDLINE ITEM 157110

## (undated) DEVICE — PACK ENDOSCOPY GENERAL

## (undated) DEVICE — COVER OVERHEAD SURG LT BLUE

## (undated) DEVICE — Device

## (undated) DEVICE — SOL NACL IRR 1000ML BTL

## (undated) DEVICE — SYR 10CC LUER LOCK

## (undated) DEVICE — TOWEL OR DISP STRL BLUE 4/PK

## (undated) DEVICE — STRIP MEDI WND CLSR 1/2X4IN

## (undated) DEVICE — TIP YANKAUERS BULB NO VENT

## (undated) DEVICE — GLOVE BIOGEL 7.5

## (undated) DEVICE — SUT VICRYL PLUS 4-0 P3 18IN